# Patient Record
Sex: MALE | Race: WHITE | NOT HISPANIC OR LATINO | Employment: FULL TIME | URBAN - METROPOLITAN AREA
[De-identification: names, ages, dates, MRNs, and addresses within clinical notes are randomized per-mention and may not be internally consistent; named-entity substitution may affect disease eponyms.]

---

## 2017-04-01 ENCOUNTER — ALLSCRIPTS OFFICE VISIT (OUTPATIENT)
Dept: OTHER | Facility: OTHER | Age: 52
End: 2017-04-01

## 2017-07-14 ENCOUNTER — ALLSCRIPTS OFFICE VISIT (OUTPATIENT)
Dept: OTHER | Facility: OTHER | Age: 52
End: 2017-07-14

## 2017-07-14 DIAGNOSIS — I10 ESSENTIAL (PRIMARY) HYPERTENSION: ICD-10-CM

## 2017-07-14 DIAGNOSIS — R97.20 ELEVATED PROSTATE SPECIFIC ANTIGEN (PSA): ICD-10-CM

## 2017-07-14 DIAGNOSIS — N48.22 CELLULITIS OF CORPUS CAVERNOSUM AND PENIS: ICD-10-CM

## 2017-07-14 DIAGNOSIS — R53.83 OTHER FATIGUE: ICD-10-CM

## 2017-07-14 DIAGNOSIS — R76.8 OTHER SPECIFIED ABNORMAL IMMUNOLOGICAL FINDINGS IN SERUM: ICD-10-CM

## 2017-07-18 ENCOUNTER — LAB CONVERSION - ENCOUNTER (OUTPATIENT)
Dept: OTHER | Facility: OTHER | Age: 52
End: 2017-07-18

## 2017-07-18 LAB
A/G RATIO (HISTORICAL): 1.7 (CALC) (ref 1–2.5)
ALBUMIN SERPL BCP-MCNC: 4.4 G/DL (ref 3.6–5.1)
ALP SERPL-CCNC: 44 U/L (ref 40–115)
ALT SERPL W P-5'-P-CCNC: 29 U/L (ref 9–46)
ANTI-NUCLEAR ANTIBODY (ANA) (HISTORICAL): NEGATIVE
AST SERPL W P-5'-P-CCNC: 22 U/L (ref 10–35)
BASOPHILS # BLD AUTO: 0.6 %
BASOPHILS # BLD AUTO: 31 CELLS/UL (ref 0–200)
BILIRUB SERPL-MCNC: 0.6 MG/DL (ref 0.2–1.2)
BUN SERPL-MCNC: 20 MG/DL (ref 7–25)
BUN/CREA RATIO (HISTORICAL): NORMAL (CALC) (ref 6–22)
C-REACTIVE PROTEIN (HISTORICAL): <0.1 MG/DL
CALCIUM SERPL-MCNC: 9.3 MG/DL (ref 8.6–10.3)
CHLORIDE SERPL-SCNC: 104 MMOL/L (ref 98–110)
CHOLEST SERPL-MCNC: 158 MG/DL (ref 125–200)
CHOLEST/HDLC SERPL: 3.2 (CALC)
CO2 SERPL-SCNC: 26 MMOL/L (ref 20–31)
CREAT SERPL-MCNC: 1.3 MG/DL (ref 0.7–1.33)
DEPRECATED RDW RBC AUTO: 12.8 % (ref 11–15)
EGFR AFRICAN AMERICAN (HISTORICAL): 73 ML/MIN/1.73M2
EGFR-AMERICAN CALC (HISTORICAL): 63 ML/MIN/1.73M2
EOSINOPHIL # BLD AUTO: 112 CELLS/UL (ref 15–500)
EOSINOPHIL # BLD AUTO: 2.2 %
GAMMA GLOBULIN (HISTORICAL): 2.6 G/DL (CALC) (ref 1.9–3.7)
GLUCOSE (HISTORICAL): 90 MG/DL (ref 65–99)
HCT VFR BLD AUTO: 44.3 % (ref 38.5–50)
HDLC SERPL-MCNC: 50 MG/DL
HGB BLD-MCNC: 14.8 G/DL (ref 13.2–17.1)
LDL CHOLESTEROL (HISTORICAL): 98 MG/DL (CALC)
LYME 18 KD IGG (HISTORICAL): ABNORMAL
LYME 23 KD IGG (HISTORICAL): ABNORMAL
LYME 23 KD IGM (HISTORICAL): ABNORMAL
LYME 28 KD IGG (HISTORICAL): ABNORMAL
LYME 30 KD IGG (HISTORICAL): ABNORMAL
LYME 39 KD IGG (HISTORICAL): ABNORMAL
LYME 39 KD IGM (HISTORICAL): ABNORMAL
LYME 41 KD IGG (HISTORICAL): REACTIVE
LYME 41 KD IGM (HISTORICAL): ABNORMAL
LYME 45 KD IGG (HISTORICAL): ABNORMAL
LYME 58 KD IGG (HISTORICAL): ABNORMAL
LYME 66 KD IGG (HISTORICAL): REACTIVE
LYME 93 KD IGG (HISTORICAL): ABNORMAL
LYME IGG (HISTORICAL): NEGATIVE
LYME IGG/IGM AB (HISTORICAL): 1.2 INDEX
LYME IGM (HISTORICAL): NEGATIVE
LYMPHOCYTES # BLD AUTO: 1387 CELLS/UL (ref 850–3900)
LYMPHOCYTES # BLD AUTO: 27.2 %
MCH RBC QN AUTO: 28.7 PG (ref 27–33)
MCHC RBC AUTO-ENTMCNC: 33.4 G/DL (ref 32–36)
MCV RBC AUTO: 85.9 FL (ref 80–100)
MONOCYTES # BLD AUTO: 653 CELLS/UL (ref 200–950)
MONOCYTES (HISTORICAL): 12.8 %
NEUTROPHILS # BLD AUTO: 2917 CELLS/UL (ref 1500–7800)
NEUTROPHILS # BLD AUTO: 57.2 %
NON-HDL-CHOL (CHOL-HDL) (HISTORICAL): 108 MG/DL (CALC)
PLATELET # BLD AUTO: NORMAL 10*3/UL
PLATELET # BLD AUTO: NORMAL THOUSAND/UL
POTASSIUM SERPL-SCNC: 4.4 MMOL/L (ref 3.5–5.3)
RBC # BLD AUTO: 5.16 MILLION/UL (ref 4.2–5.8)
RBC MORPHOLOGY (HISTORICAL): NORMAL
RHEUMATOID FACTOR (HISTORICAL): 10 IU/ML
SODIUM SERPL-SCNC: 138 MMOL/L (ref 135–146)
TESTOSTERONE FREE (HISTORICAL): 97.4 PG/ML (ref 35–155)
TESTOSTERONE TOTAL (HISTORICAL): 464 NG/DL (ref 250–1100)
TOTAL PROTEIN (HISTORICAL): 7 G/DL (ref 6.1–8.1)
TRIGL SERPL-MCNC: 49 MG/DL
TSH SERPL DL<=0.05 MIU/L-ACNC: 2 MIU/L (ref 0.4–4.5)
WBC # BLD AUTO: 5.1 THOUSAND/UL (ref 3.8–10.8)

## 2017-07-21 ENCOUNTER — ALLSCRIPTS OFFICE VISIT (OUTPATIENT)
Dept: OTHER | Facility: OTHER | Age: 52
End: 2017-07-21

## 2017-07-21 LAB
BILIRUB UR QL STRIP: NORMAL
CLARITY UR: NORMAL
COLOR UR: YELLOW
GLUCOSE (HISTORICAL): NORMAL
HGB UR QL STRIP.AUTO: NORMAL
KETONES UR STRIP-MCNC: NORMAL MG/DL
LEUKOCYTE ESTERASE UR QL STRIP: NORMAL
NITRITE UR QL STRIP: NORMAL
PH UR STRIP.AUTO: 5.5 [PH]
PROT UR STRIP-MCNC: NORMAL MG/DL
SP GR UR STRIP.AUTO: 1.01
UROBILINOGEN UR QL STRIP.AUTO: 0.2

## 2017-07-28 ENCOUNTER — GENERIC CONVERSION - ENCOUNTER (OUTPATIENT)
Dept: OTHER | Facility: OTHER | Age: 52
End: 2017-07-28

## 2017-07-28 PROCEDURE — G0416 PROSTATE BIOPSY, ANY MTHD: HCPCS | Performed by: UROLOGY

## 2017-07-30 ENCOUNTER — LAB REQUISITION (OUTPATIENT)
Dept: LAB | Facility: HOSPITAL | Age: 52
End: 2017-07-30
Payer: COMMERCIAL

## 2017-07-30 DIAGNOSIS — R97.20 ELEVATED PROSTATE SPECIFIC ANTIGEN (PSA): ICD-10-CM

## 2017-08-02 ENCOUNTER — GENERIC CONVERSION - ENCOUNTER (OUTPATIENT)
Dept: OTHER | Facility: OTHER | Age: 52
End: 2017-08-02

## 2017-09-01 ENCOUNTER — ALLSCRIPTS OFFICE VISIT (OUTPATIENT)
Dept: OTHER | Facility: OTHER | Age: 52
End: 2017-09-01

## 2017-09-15 DIAGNOSIS — M25.529 PAIN IN ELBOW: ICD-10-CM

## 2017-09-15 DIAGNOSIS — I10 ESSENTIAL (PRIMARY) HYPERTENSION: ICD-10-CM

## 2017-09-15 DIAGNOSIS — N40.0 ENLARGED PROSTATE WITHOUT LOWER URINARY TRACT SYMPTOMS (LUTS): ICD-10-CM

## 2017-09-27 ENCOUNTER — GENERIC CONVERSION - ENCOUNTER (OUTPATIENT)
Dept: OTHER | Facility: OTHER | Age: 52
End: 2017-09-27

## 2017-09-27 ENCOUNTER — ALLSCRIPTS OFFICE VISIT (OUTPATIENT)
Dept: OTHER | Facility: OTHER | Age: 52
End: 2017-09-27

## 2017-10-04 ENCOUNTER — ALLSCRIPTS OFFICE VISIT (OUTPATIENT)
Dept: OTHER | Facility: OTHER | Age: 52
End: 2017-10-04

## 2017-10-04 ENCOUNTER — HOSPITAL ENCOUNTER (OUTPATIENT)
Dept: RADIOLOGY | Facility: HOSPITAL | Age: 52
Discharge: HOME | End: 2017-10-04
Attending: ORTHOPAEDIC SURGERY
Payer: COMMERCIAL

## 2017-10-04 DIAGNOSIS — M25.529 PAIN IN ELBOW: ICD-10-CM

## 2017-10-04 PROCEDURE — 73080 X-RAY EXAM OF ELBOW: CPT

## 2017-10-06 NOTE — PROGRESS NOTES
Assessment  1  Tendinitis of right triceps (725 11) (M77 8)    Plan  Pain in elbow joint    · * XR ELBOW 3+ VIEW RIGHT; Status:Active - Retrospective By Protocol Authorization; Requested for:04Oct2017;   Tendinitis of right triceps    · MethylPREDNISolone 4 MG Oral Tablet Therapy Pack (Medrol); take daily and  complete  as per instructions on Pack   · Stretch and warm up your muscles during the first 10 minutes , then cool down your  muscles for the last 10 minutes of exercise ; Status:Complete;   Done: 52BAV6188    Discussion/Summary    Patient seen and examined by Dr Ronald Black, who developed the assessment and plan his care  I am acting as a scribe on his behalf   this time, we will provide patient with a triceps strap  contraindications of corticosteroid injections for this condition  and activities as tolerated, advised caution when doing negative wraps for risk of tendon rupture  referred to PT in office, to review home therapy programinitiate a course of oral steroidif condition worsens  in 2 months for reevaluation  Chief Complaint  1  Elbow Pain  Patient presents for right elbow pain times past 2-3 months  History of Present Illness  HPI: Patient is a 51-year-old, right-hand-dominant male who presents for right elbow pain times past 2-3 months  He notes pain is located at the posterior elbow, is throbbing, nonradiating and is made worse with heavy lifting  And made better with gentle stretching  He further notes associated, occasional right shoulder pain times past 2-3 years  This pain is worse upon waking, especially after sleeping on ipsilateral side  He is currently seeking an appointment with Dr Benito Pickett for evaluation of said shoulder  Patient reports he is an avid weightlifter and attends the gym 3-4 times per week  He denies any associated trauma, numbness, tingling fever or chills    past medical history, surgical history, family history, social history, medications, allergies, and review of systems have been read and reviewed on the chart and have been updated  Review of Systems was recorded in the office today on a separate evaluation sheet and is listed below  Review of Systems    Constitutional: no fever,-not feeling poorly,-no chills-and-not feeling tired  Eyes: eyes not red-and-no eyesight problems  ENT: no hearing loss,-no nosebleeds-and-no sore throat  Cardiovascular: no chest pain-and-no palpitations  Respiratory: no shortness of breath,-no cough-and-no wheezing  Gastrointestinal: no abdominal pain-and-no constipation  Musculoskeletal: as noted in HPI  Integumentary: no rashes  Neurological: no numbness-and-no dizziness  Active Problems  1  Acute tonsillitis (463) (J03 90)   2  Allergic rhinitis (477 9) (J30 9)   3  Angiomyolipoma (223 0) (D17 9)   4  Benign hypertrophy of prostate (600 00) (N40 0)   5  Cellulitis of shaft of penis (607 2) (N48 22)   6  Colorectal polyps (211 3) (K63 5)   7  Creatinine elevation (790 99) (R79 89)   8  Elevated PSA (790 93) (R97 20)   9  Fatigue (780 79) (R53 83)   10  Hand pain, unspecified laterality   11  Hemangioma of skin (228 01) (D18 01)   12  Hypertension (401 9) (I10)   13  Insomnia (780 52) (G47 00)   14  Pain in elbow joint (719 42) (M25 529)   15  Rheumatoid factor positive (795 79) (R76 8)   16  Sinusitis (473 9) (J32 9)   17  Tension type headache (339 10) (G44 209)   18  Thrombocytopenia (287 5) (D69 6)   19   Urinary urgency (788 63) (R39 15)    Past Medical History   · History of herpes simplex infection (V12 09) (Z86 19)   · History of Normal echocardiogram   · History of Normal nuclear stress test    Surgical History   · History of Complete Colonoscopy    Family History   · Family history of CABG   · Family history of Diabetes Mellitus (V18 0)   · Family history of Hypertension (V17 49)   · Family history of Hypertension (V17 49)   · Family history of Kidney Cancer (V16 51)   · Family history of Prostate Cancer (T44 85)    Social History   · Marital History - Currently    · Never A Smoker    Current Meds   1  AmLODIPine Besylate 10 MG Oral Tablet; take one tablet by mouth every day; Therapy: 96XNB2812 to (UJKKHBSX:52JTI3743)  Requested for: 81ZGT7338; Last   Rx:46Kjh6238 Ordered   2  Bystolic 10 MG Oral Tablet; TAKE 1 TABLET DAILY; Therapy: 67Ihj9435 to (Evaluate:55Bso5910)  Requested for: 01Apr2017; Last   Rx:01Apr2017 Ordered   3  Centrum Ultra Mens Oral Tablet; Take 1 tablet daily; Therapy: ([id number]) to Recorded   4  HydroCHLOROthiazide 12 5 MG Oral Tablet; take 1/2 tablet daily; Therapy: 77CTD7290 to (Leonides Irwin)  Requested for: 01Sep2017 Recorded   5  Lisinopril 2 5 MG Oral Tablet; take 1-2 tabs po qd; Therapy: 95Bnk0286 to (Last Rx:58Qpa9911)  Requested for: 01Sep2017 Ordered   6  Turmeric Curcumin Oral Capsule; TAKE 6 CAPSULE DAILY; Therapy: 02Gqa1709 to (Evaluate:12Oct2017) Recorded   7  Vitamin D3 2000 UNIT Oral Tablet; take 3 tablets daily; Therapy: 68JXW0417 to Recorded    Allergies  1  Penicillins   2  Avelox TABS    Vitals  Signs   Heart Rate: 61  Systolic: 788  Diastolic: 79  Height: 5 ft 7 in  Weight: 211 lb   BMI Calculated: 33 05  BSA Calculated: 2 07    Physical Exam      General: No acute distress, age-appropriate  Neck: Supple, trachea midline  HEENT: Normocephalic atraumatic, mucous membranes are moist, sclera are nonicteric  Cardiovascular: No discernable arrhythmia  Respiratory: Breathing is even and unlabored, no stridor or audible wheezing  Psychiatric: Awake alert and oriented x3, normal mood and affect  Abdomen: Without rebound or guarding  Right Basic:   Right upper extremity: There is no gross deformity, edema, erythema or ecchymosis  There is mild to moderate point tenderness to palpation at the triceps insertion site  There is full elbow range of motion  Neurovascularly intact distally     R Elbow: Tenderness (Triceps insertion), but Full Range of Motion, No Valgus instability, No Varus Instability, No Posteromedial instability, No Posterolateral instability, Negative Pivot Shift Test, Negative Milking Test, Negative Valgus Hyperextension Test, Negative Hangar Sign, No Olecranon Bursitis and No Crepitus    Skin: was evaluated and demonstrated no masses, no abrasions, no erythema, no effusion, no edema, no fluctuance, no induration, no laceration, no ulceration, no lymphadenopathy  Right Upper Neurovascular: were evaluated and demonstrated: The patient has normal motor strength to the median, ulnar and radial nerve  Normal sensation to the median, ulnar, and radial nerve  Normal pulses in the radial and ulnar artery  Capillary refill is < 2 seconds  Results/Data  I personally reviewed the films/images/results in the office today  My interpretation follows  X-ray Review X-rays of right elbow reveal no osseous abnormalities  Minimal arthritic change  Attending Note  Collaborating Physician Note: Collaborating Physician: I interviewed and examined the patient,-I supervised the Advanced Practitioner-and-I agree with the Advanced Practitioner note  Attending Note [facility]:  Please see the note documented above with my additions and corrections as dictated in the above-mentioned note  Future Appointments    Date/Time Provider Specialty Site   10/20/2017 07:30 AM VAUGHN Benedict  [facility] [address]   10/17/2017 02:30 PM VAUGHN White  Orthopedic Surgery [facility]   12/15/2017 07:45 AM VAUGHN Garcia   Orthopedic Surgery [facility]   08/01/2018 04:15 PM Radha Garsia MD Urology [address]     Signatures   Electronically signed by : ALLIE Reeves; Oct  4 2017  5:27PM EST                       (Author)    Electronically signed by : VAUGHN Baker ; Oct  5 2017  3:01PM EST                       (Author)

## 2017-10-17 ENCOUNTER — APPOINTMENT (OUTPATIENT)
Dept: RADIOLOGY | Facility: OTHER | Age: 52
End: 2017-10-17
Payer: COMMERCIAL

## 2017-10-17 ENCOUNTER — ALLSCRIPTS OFFICE VISIT (OUTPATIENT)
Dept: OTHER | Facility: OTHER | Age: 52
End: 2017-10-17

## 2017-10-17 DIAGNOSIS — M75.41 IMPINGEMENT SYNDROME OF RIGHT SHOULDER: ICD-10-CM

## 2017-10-17 DIAGNOSIS — M25.511 PAIN IN RIGHT SHOULDER: ICD-10-CM

## 2017-10-17 PROCEDURE — 73030 X-RAY EXAM OF SHOULDER: CPT

## 2017-10-18 NOTE — PROGRESS NOTES
Assessment  1  Shoulder pain, right (719 41) (M25 511)   2  Impingement syndrome of right shoulder (726 2) (K24 04)    Plan  Impingement syndrome of right shoulder    · Follow-up visit in 6 weeks Evaluation and Treatment  Follow-up  Status: Complete  Done:  89ZBT5929   · *1 - [facility]  eval and treat  1 to 3 times a week for 4 weeks  transition to home program when appropriate  ROm, stretching and strengthening  local modalities at therapist discretion  thank you  Status: Active  Requested for: 70ROT0204  Care Summary provided  : Yes  Shoulder pain, right    · * XR SHOULDER 2+ VIEW RIGHT; Status:Active - Retrospective By Protocol  Authorization; Requested for:51Ecc6593;     Discussion/Summary    The patient has an examination and clinical history consistent with subacromial impingement syndrome and some mild acromioclavicular joint symptoms  At this point given his lack of care a referral to physical therapy was provided and subacromial injection was offered and then performed as detailed above  I do feel he should benefit from this treatment and we be happy to reevaluate him in 6-8 weeks, failure of improvement may require further evaluation, possibly MRI scan of the right shoulder  Marcello Coresa MD interviewed and examined the patient, reviewed the diagnostic imaging and developed/ implemented the plan of care as described in my discussion  The visit was performed with the assistance of my physician assistant Ms Roseline Shah who scribed some parts of the note  I personally wrote and developed the discussion  History of Present Illness  Dilan Ortez is a 45 y/o male who presents with 6 month history of right shoulder pain  He denies any known injury or trauma to the right shoulder  Occasional discomfort throughout the day (especially after working out in the gym)  Most pain and discomfort occurs at night when sleeping on the right side   Dr Lucillie Cabot gave him a medrol dose pack for his elbow tendonitis and that helped over 50 % with the right shoulder  Unfortunately he had to stop the prednisone due to his blood pressure  He has not had any formal treatment such as therapy  Review of Systems    Constitutional: No fever or chills, feels well, no tiredness, no recent weight loss or weight gain  Cardiovascular: No complaints of chest pain, no palpitations, no leg claudication or lower extremity edema  Respiratory: No complaints of shortness of breath, no wheezing, no cough  Gastrointestinal: No complaints of abdominal pain, no constipation, no nausea or vomiting, no diarrhea or bloody stools  Musculoskeletal: as noted in HPI  Integumentary: No complaints of skin rash or lesion, no itching or dry skin, no skin wounds  Neurological: No complaints of headache, no confusion, no numbness or tingling, no dizziness  ROS reviewed  Active Problems  1  Acute tonsillitis (463) (J03 90)   2  Allergic rhinitis (477 9) (J30 9)   3  Angiomyolipoma (223 0) (D17 9)   4  Benign hypertrophy of prostate (600 00) (N40 0)   5  Cellulitis of shaft of penis (607 2) (N48 22)   6  Colorectal polyps (211 3) (K63 5)   7  Creatinine elevation (790 99) (R79 89)   8  Elevated PSA (790 93) (R97 20)   9  Fatigue (780 79) (R53 83)   10  Hand pain, unspecified laterality   11  Hemangioma of skin (228 01) (D18 01)   12  Hypertension (401 9) (I10)   13  Insomnia (780 52) (G47 00)   14  Pain in elbow joint (719 42) (M25 529)   15  Rheumatoid factor positive (795 79) (R76 8)   16  Shoulder pain, right (719 41) (M25 511)   17  Sinusitis (473 9) (J32 9)   18  Tendinitis of right triceps (726 39) (M77 8)   19  Tension type headache (339 10) (G44 209)   20  Thrombocytopenia (287 5) (D69 6)   21   Urinary urgency (788 63) (R39 15)    Past Medical History   · History of herpes simplex infection (V12 09) (Z86 19)   · History of Normal echocardiogram   · History of Normal nuclear stress test    The active problems and past medical history were reviewed and updated today  Surgical History   · History of Complete Colonoscopy    The surgical history was reviewed and updated today  Family History  Mother    · Family history of CABG   · Family history of Diabetes Mellitus (V18 0)   · Family history of Hypertension (V17 49)  Father    · Family history of Hypertension (V17 49)  Family History    · Family history of Kidney Cancer (V16 51)   · Family history of Prostate Cancer (V16 42)    The family history was reviewed and updated today  Social History   · Marital History - Currently    · Never A Smoker  The social history was reviewed and updated today  Current Meds   1  AmLODIPine Besylate 10 MG Oral Tablet; take one tablet by mouth every day; Therapy: 19NOG9172 to (GOGCYD:63TFY3108)  Requested for: 39FBN8241; Last   Rx:27Ire3748 Ordered   2  Bystolic 10 MG Oral Tablet; TAKE 1 TABLET DAILY; Therapy: 08Ocl4771 to (Evaluate:66Qlo6482)  Requested for: 81Byt8275; Last   Rx:61Bpg3561 Ordered   3  Centrum Ultra Mens Oral Tablet; Take 1 tablet daily; Therapy: (Gina Claire) to Recorded   4  HydroCHLOROthiazide 12 5 MG Oral Tablet; take 1/2 tablet daily; Therapy: 39QAJ4416 to (Alana Le)  Requested for: 43Hgv0986 Recorded   5  Lisinopril 2 5 MG Oral Tablet; take 1-2 tabs po qd; Therapy: 27Zpn7870 to (Last Rx:68Tel9841)  Requested for: 22Dsn7642 Ordered   6  MethylPREDNISolone 4 MG Oral Tablet Therapy Pack; take daily and complete  as per   instructions on Pack; Therapy: 72YTT0653 to (Last Rx:04Oct2017)  Requested for: 69RBB4509 Ordered   7  Turmeric Curcumin Oral Capsule; TAKE 6 CAPSULE DAILY; Therapy: 69Bqb4072 to (Evaluate:12Oct2017) Recorded   8  Vitamin D3 2000 UNIT Oral Tablet; take 3 tablets daily; Therapy: 36CFH7202 to Recorded    The medication list was reviewed and updated today  Allergies  1  Penicillins   2   Avelox TABS    Vitals   Recorded: 22OUZ5203 02:42PM   Heart Rate 59   Systolic 040, Sitting   Diastolic 78, Sitting   Weight 210 lb 4 oz   BMI Calculated 32 93   BSA Calculated 2 07     Physical Exam    Right Shoulder: Appearance: AC joint hypertrophy, but-- no dislocation,-- no ecchymosis-- and-- no erythema  Tenderness: AC joint, but-- not the deltoid-- and-- not the trapezial  ROM: equivalent both sides  Motor: Normal  Special Tests: positive Painful Arc,-- positive Neer test,-- positive Badillo's test-- and-- positive Speed's test, but-- negative Espinosa test,-- negative Drop Arm test-- and-- negative Empty Can test    Constitutional - General appearance: Normal    Musculoskeletal - Muscle strength/tone: Normal -- Upper extremity compartments: Normal    Neurologic - Sensation: Normal -- Upper extremity peripheral neuro exam: Normal    Psychiatric - Orientation to person, place, and time: Normal -- Mood and affect: Normal       Results/Data  I personally reviewed the films/images/results in the office today  My interpretation follows  X-ray Review 3 views right shoulder: no glenohumeral OA, no calcific tendinitis; mild to moderate AC joint arthritis  Procedure    Procedure: Injection of the left subacromial bursa  Indication:  inflammation  Were discussed with the patient  Verbal consent was obtained prior to the procedure  Alcohol was used to prep the area  ethyl chloride spray was used as a topical anesthetic  Using sterile technique, the aspiration/injection needle was then directed from a lateral aspect  A 22-gauge was used to inject 2 mL 0 25% Bupivacaine-- and-- mL of 6mg/mL betamethasone  A bandage was applied  the patient tolerated the procedure well  Complications: none  Patient instructed to avoid strenuous activity for 1 day(s)  Follow-up in the office in 6 week(s)  Future Appointments    Date/Time Provider Specialty Site   10/20/2017 07:30 AM VAUGHN Veras   [address]   12/21/2017 10:20 AM VAUGHN Medrano  Orthopedic Surgery [facility]   12/15/2017 07:45 AM VAUGHN Lamas   Orthopedic Surgery [facility] RE[facility]ABILIT[facility]   08/01/2018 04:15 PM Anayeli Otto MD Urology [address] [place] [address]     Signatures   Electronically signed by : VAUGHN Gomez ; Oct 17 2017  4:17PM EST                       (Author)

## 2017-10-20 ENCOUNTER — ALLSCRIPTS OFFICE VISIT (OUTPATIENT)
Dept: OTHER | Facility: OTHER | Age: 52
End: 2017-10-20

## 2017-10-23 NOTE — PROGRESS NOTES
Assessment  1  Hypertension (401 9) (I10)   · Stress ECHO was normal 1/2016      Renal artery scan - 12/2015 - NORMAL   2  Elevated PSA (790 93) (R97 20)   · MRI prostate 8/2016 - diffuse abnormality , tumor vs prostatitis      [facility] [place] follows   3  Rheumatoid factor positive (795 79) (R76 8)   · Evaluated by Dr Angela Jurado  Fatigue, Rheumatoid factor positive    · 2 - Catie DOWLING, Mee Barclay  (Rheumatology) Co-Management  *  Status: Hold For -  Scheduling  Requested for: 01ZHC5035  Care Summary provided  : Yes    Discussion/Summary    Patient presents for follow-up  controlled, continue regimen of amlodipine, Bystolic and HCTZ 7 72 mg daily  Patient will proceed with BMP  rheumatoid factor  Patient reports significant improvement of his symptoms after that steroid Dosepak  He also noticed benefits of daily Turmeric supplements  am concerned about possibility of connective tissue disease, undiagnosed  Referral to rheumatology for further evaluation  PSA-urology follows6 weeks  The patient was counseled regarding instructions for management,-- impressions  Possible side effects of new medications were reviewed with the patient/guardian today  The treatment plan was reviewed with the patient/guardian  The patient/guardian understands and agrees with the treatment plan      Chief Complaint  Pt is here for follow up office visit  All meds/allergies reviewed and updated w/pt  History of Present Illness  Follow-up hypertension and elevated PSAprostate bx was negativesees Dr Maradiaga - urology   lisinopril caused  dryness' and cough   back to HCTZ 1/2 tab po qd  Patient remains on Bystolic and Norvascis feeling well overall aside from chronic arthralgias  was evaluated by [facility]'s Orthopedic surgery regarding shoulder and elbow pain  noted drastic improvement of all his symptoms while on steroid Dosepak; patient with history of positive rheumatoid factor, he was evaluated by Rheumatology few years ago, no definitive diagnosis or treatment  suggested at that time  is currently using Turmeric supplements and has noticed that his tonsils are smaller and generalized achiness has improved  Review of Systems    Constitutional: No fever or chills, feels well, no tiredness, no recent weight gain or weight loss  Eyes: No complaints of eye pain, no red eyes, no discharge from eyes, no itchy eyes  ENT: no complaints of earache, no hearing loss, no nosebleeds, no nasal discharge, no sore throat, no hoarseness  Cardiovascular: No complaints of slow heart rate, no fast heart rate, no chest pain, no palpitations, no leg claudication, no lower extremity  Respiratory: No complaints of shortness of breath, no wheezing, no cough, no SOB on exertion, no orthopnea or PND  Gastrointestinal: No complaints of abdominal pain, no constipation, no nausea or vomiting, no diarrhea or bloody stools  Genitourinary: No complaints of dysuria, no incontinence, no hesitancy, no nocturia, no genital lesion, no testicular pain  Musculoskeletal: arthralgias-- and-- myalgias  Integumentary: No complaints of skin rash or skin lesions, no itching, no skin wound, no dry skin  Neurological: No compliants of headache, no confusion, no convulsions, no numbness or tingling, no dizziness or fainting, no limb weakness, no difficulty walking  Psychiatric: Is not suicidal, no sleep disturbances, no anxiety or depression, no change in personality, no emotional problems  Endocrine: No complaints of proptosis, no hot flashes, no muscle weakness, no erectile dysfunction, no deepening of the voice, no feelings of weakness  Hematologic/Lymphatic: No complaints of swollen glands, no swollen glands in the neck, does not bleed easily, no easy bruising  Active Problems  1  Acute tonsillitis (463) (J03 90)   2  Allergic rhinitis (477 9) (J30 9)   3  Angiomyolipoma (223 0) (D17 9)   4  Benign hypertrophy of prostate (600 00) (N40 0)   5  Cellulitis of shaft of penis (607 2) (N48 22)   6  Colorectal polyps (211 3) (K63 5)   7  Creatinine elevation (790 99) (R79 89)   8  Elevated PSA (790 93) (R97 20)   9  Fatigue (780 79) (R53 83)   10  Hand pain, unspecified laterality   11  Hemangioma of skin (228 01) (D18 01)   12  Hypertension (401 9) (I10)   13  Impingement syndrome of right shoulder (726 2) (M75 41)   14  Insomnia (780 52) (G47 00)   15  Pain in elbow joint (719 42) (M25 529)   16  Rheumatoid factor positive (795 79) (R76 8)   17  Shoulder pain, right (719 41) (M25 511)   18  Sinusitis (473 9) (J32 9)   19  Tendinitis of right triceps (726 39) (M77 8)   20  Tension type headache (339 10) (G44 209)   21  Thrombocytopenia (287 5) (D69 6)   22  Urinary urgency (788 63) (R39 15)    Past Medical History  1  History of herpes simplex infection (V12 09) (Z86 19)   2  History of Normal echocardiogram   3  History of Normal nuclear stress test    The active problems and past medical history were reviewed and updated today  Surgical History  1  History of Complete Colonoscopy    The surgical history was reviewed and updated today  Family History  Mother    1  Family history of CABG   2  Family history of Diabetes Mellitus (V18 0)   3  Family history of Hypertension (V17 49)  Father    4  Family history of Hypertension (V17 49)  Family History    5  Family history of Kidney Cancer (V16 51)   6  Family history of Prostate Cancer (V16 42)    The family history was reviewed and updated today  Social History   · Marital History - Currently    · Never A Smoker  The social history was reviewed and updated today  Current Meds   1  AmLODIPine Besylate 10 MG Oral Tablet; take one tablet by mouth every day; Therapy: 66KKK4992 to (WUVEANFX:51TGI2357)  Requested for: 01PBZ2604; Last   Rx:40Cdw3888 Ordered   2  Bystolic 10 MG Oral Tablet; TAKE 1 TABLET DAILY;    Therapy: 77Poi4942 to (Evaluate:49Vvi5265)  Requested for: 01Apr2017; Last Rx:58Aof4597 Ordered   3  Centrum Ultra Mens Oral Tablet; Take 1 tablet daily; Therapy: (Enrique Wayne) to Recorded   4  HydroCHLOROthiazide 12 5 MG Oral Tablet; take 1/2 tablet daily; Therapy: 65XMT0937 to (Areli Winn)  Requested for: 01Sep2017 Recorded   5  Turmeric Curcumin Oral Capsule; TAKE 6 CAPSULE DAILY; Therapy: 70Bdv0318 to (Evaluate:12Oct2017) Recorded   6  Vitamin D3 2000 UNIT Oral Tablet; take 3 tablets daily; Therapy: 18LSX7410 to Recorded    The medication list was reviewed and updated today  Allergies  1  Penicillins   2  Avelox TABS    Vitals  Vital Signs    Recorded: 64PDL0056 07:35AM   Temperature 97 3 F   Heart Rate 60   Respiration 16   Systolic 099   Diastolic 80   Height 5 ft 7 in   Weight 211 lb 6 oz   BMI Calculated 33 11   BSA Calculated 2 07     Physical Exam    Constitutional   General appearance: No acute distress, well appearing and well nourished  Ears, Nose, Mouth, and Throat   Oropharynx: Normal with no erythema, edema, exudate or lesions  The posterior pharynx was not erythematous-- and-- did not have an exudate  There was 1-2+ enlargement, but no erythema, no swelling and no concretions of both tonsils  Pulmonary   Respiratory effort: No increased work of breathing or signs of respiratory distress  Auscultation of lungs: Clear to auscultation, equal breath sounds bilaterally, no wheezes, no rales, no rhonci  Cardiovascular   Auscultation of heart: Normal rate and rhythm, normal S1 and S2, without murmurs  Examination of extremities for edema and/or varicosities: Normal     Carotid pulses: Normal     Musculoskeletal   Gait and station: Normal     Neurologic   Cranial nerves: Cranial nerves 2-12 intact  Psychiatric   Orientation to person, place and time: Normal     Mood and affect: Normal          Future Appointments    Date/Time Provider Specialty Site   12/08/2017 08:00 AM Melony Prader, M D   Family Medicine [address] [place] [address] PRACTICE   12/21/2017 10:20 AM VAUGHN Painting  Orthopedic Surgery [facility] SP[facility]   12/15/2017 07:45 AM VAUGHN Omer  Orthopedic Surgery [facility]   08/01/2018 04:15 PM Sanna Angelucci, MD Urology [address] [place] [address]     Signatures   Electronically signed by :  VAUGHN Figueredo ; Oct 22 2017  8:08PM EST                       (Author)

## 2017-10-29 ENCOUNTER — GENERIC CONVERSION - ENCOUNTER (OUTPATIENT)
Dept: OTHER | Facility: OTHER | Age: 52
End: 2017-10-29

## 2017-10-29 LAB
BUN SERPL-MCNC: 24 MG/DL (ref 7–25)
BUN/CREA RATIO (HISTORICAL): NORMAL (CALC) (ref 6–22)
CALCIUM SERPL-MCNC: 9.6 MG/DL (ref 8.6–10.3)
CHLORIDE SERPL-SCNC: 107 MMOL/L (ref 98–110)
CO2 SERPL-SCNC: 28 MMOL/L (ref 20–31)
CREAT SERPL-MCNC: 1.31 MG/DL (ref 0.7–1.33)
EGFR AFRICAN AMERICAN (HISTORICAL): 73 ML/MIN/1.73M2
EGFR-AMERICAN CALC (HISTORICAL): 63 ML/MIN/1.73M2
GLUCOSE (HISTORICAL): 97 MG/DL (ref 65–99)
POTASSIUM SERPL-SCNC: 4.1 MMOL/L (ref 3.5–5.3)
SODIUM SERPL-SCNC: 140 MMOL/L (ref 135–146)

## 2017-11-09 ENCOUNTER — APPOINTMENT (OUTPATIENT)
Dept: PHYSICAL THERAPY | Facility: REHABILITATION | Age: 52
End: 2017-11-09
Payer: COMMERCIAL

## 2017-11-09 ENCOUNTER — GENERIC CONVERSION - ENCOUNTER (OUTPATIENT)
Dept: OTHER | Facility: OTHER | Age: 52
End: 2017-11-09

## 2017-11-09 PROCEDURE — G8991 OTHER PT/OT GOAL STATUS: HCPCS

## 2017-11-09 PROCEDURE — G8990 OTHER PT/OT CURRENT STATUS: HCPCS

## 2017-11-09 PROCEDURE — 97161 PT EVAL LOW COMPLEX 20 MIN: CPT

## 2017-11-09 PROCEDURE — 97110 THERAPEUTIC EXERCISES: CPT

## 2017-11-15 ENCOUNTER — APPOINTMENT (OUTPATIENT)
Dept: PHYSICAL THERAPY | Facility: REHABILITATION | Age: 52
End: 2017-11-15
Payer: COMMERCIAL

## 2017-11-15 PROCEDURE — 97110 THERAPEUTIC EXERCISES: CPT

## 2017-11-15 PROCEDURE — 97112 NEUROMUSCULAR REEDUCATION: CPT

## 2017-11-15 PROCEDURE — 97140 MANUAL THERAPY 1/> REGIONS: CPT

## 2017-11-20 ENCOUNTER — APPOINTMENT (OUTPATIENT)
Dept: PHYSICAL THERAPY | Facility: REHABILITATION | Age: 52
End: 2017-11-20
Payer: COMMERCIAL

## 2017-11-20 PROCEDURE — 97112 NEUROMUSCULAR REEDUCATION: CPT

## 2017-11-20 PROCEDURE — 97110 THERAPEUTIC EXERCISES: CPT

## 2017-11-20 PROCEDURE — 97140 MANUAL THERAPY 1/> REGIONS: CPT

## 2017-11-22 ENCOUNTER — APPOINTMENT (OUTPATIENT)
Dept: PHYSICAL THERAPY | Facility: REHABILITATION | Age: 52
End: 2017-11-22
Payer: COMMERCIAL

## 2017-11-22 PROCEDURE — 97140 MANUAL THERAPY 1/> REGIONS: CPT

## 2017-11-22 PROCEDURE — 97110 THERAPEUTIC EXERCISES: CPT

## 2017-11-22 PROCEDURE — 97112 NEUROMUSCULAR REEDUCATION: CPT

## 2017-12-08 ENCOUNTER — ALLSCRIPTS OFFICE VISIT (OUTPATIENT)
Dept: OTHER | Facility: OTHER | Age: 52
End: 2017-12-08

## 2017-12-08 DIAGNOSIS — R76.8 OTHER SPECIFIED ABNORMAL IMMUNOLOGICAL FINDINGS IN SERUM: ICD-10-CM

## 2017-12-08 DIAGNOSIS — I10 ESSENTIAL (PRIMARY) HYPERTENSION: ICD-10-CM

## 2017-12-08 DIAGNOSIS — D69.6 THROMBOCYTOPENIA (HCC): ICD-10-CM

## 2017-12-08 DIAGNOSIS — Z00.00 ENCOUNTER FOR GENERAL ADULT MEDICAL EXAMINATION WITHOUT ABNORMAL FINDINGS: ICD-10-CM

## 2017-12-11 NOTE — PROGRESS NOTES
Assessment    1  Hypertension (401 9) (I10)   · Stress ECHO was normal 1/2016    Renal artery scan - 12/2015 - NORMAL   2  Rheumatoid factor positive (795 79) (R76 8)   · Evaluated by Dr Suni Coello Maintenance, Hypertension, Rheumatoid factor positive, Thrombocytopenia    · (1) ELHAM SCREEN W/REFLEX TO TITER/PATTERN; Status:Active; Requestedfor:92Qpv7173;    · (1) CBC/PLT/DIFF; Status:Active; Requested for:04Nih6772;    · (1) COMPREHENSIVE METABOLIC PANEL; Status:Active; Requested for:22Ytx0796;    · (1) C-REACTIVE PROTEIN; Status:Active; Requested for:05Wwc1618;    · (1) RHEUMATOID FACTOR SCREEN; Status:Active; Requested for:92Wfr3572;    · (1) SED RATE; Status:Active; Requested for:92Uvw6892;    · (1) SJOGRENS ANTIBODIES; Status:Active; Requested for:01Gsm5341;    · (1) TSH; Status:Active; Requested for:89Qoh9354;    · (Q) LYME DISEASE ANTIBODIES (IGG, IGM) WESTERN BLOT; Status:Active; Requestedfor:15Xuy2080;     Discussion/Summary    Patient presents for follow-up of chronic medical conditions  He has been feeling stably  Blood pressure is well controlled, he remains on regimen of Norvasc, Bystolic and low dose of HCTZ of 6 25 mg daily  Chronic myalgias, arthralgias, fatigue  Positive rheumatoid factor in the past, patient did notice dramatic improvement in his myalgias and arthralgias after completing prednisone taper  He is awaiting re-evaluation by Rheumatology  Will proceed with blood work as outlined above  Continue same medication regimen  He is under care of [facility] for treatment of shoulder and elbow pain  Rheumatology evaluation is unremarkable, diagnosis of fibromyalgia could be considered, we might try patient on Cymbalta   Follow-up 6 weeks  The patient was counseled regarding instructions for management,-- impressions  total time of encounter was 15 minutes-- and-- 15 minutes was spent counseling  Chief Complaint  pt is here for 6 week follow up  meds and allergies reviewed      History of Present Illness  pending rheumatology evaluationLudovicoamlodipine and Bystolicuses HCTZ 93 7 half a tablet daily  He has been feeling well overall  Blood pressures are well controlled  He added extra few half dose of Bystolic at night on a few occasions throughout the month, he did notice elevated blood pressures while he is stressed out  overall is coping well  He has been feeling stably, exercises routinely, compliant with medications  He is following up with Orthopedic surgery for treatment of elbow and shoulder pain  Review of Systems   Constitutional: No fever or chills, feels well, no tiredness, no recent weight gain or weight loss  Cardiovascular: No complaints of slow heart rate, no fast heart rate, no chest pain, no palpitations, no leg claudication, no lower extremity  Respiratory: No complaints of shortness of breath, no wheezing, no cough, no SOB on exertion, no orthopnea or PND  Musculoskeletal: arthralgias-- and-- myalgias, but-- as noted in HPI  Endocrine: No complaints of proptosis, no hot flashes, no muscle weakness, no erectile dysfunction, no deepening of the voice, no feelings of weakness  Hematologic/Lymphatic: No complaints of swollen glands, no swollen glands in the neck, does not bleed easily, no easy bruising  Active Problems  1  Acute tonsillitis (463) (J03 90)   2  Allergic rhinitis (477 9) (J30 9)   3  Angiomyolipoma (223 0) (D17 9)   4  Benign hypertrophy of prostate (600 00) (N40 0)   5  Cellulitis of shaft of penis (607 2) (N48 22)   6  Colorectal polyps (211 3) (K63 5)   7  Creatinine elevation (790 99) (R79 89)   8  Elevated PSA (790 93) (R97 20)   9  Fatigue (780 79) (R53 83)   10  Hand pain, unspecified laterality   11  Hemangioma of skin (228 01) (D18 01)   12  Hypertension (401 9) (I10)   13  Impingement syndrome of right shoulder (726 2) (M75 41)   14  Insomnia (780 52) (G47 00)   15   Pain in elbow joint (719 42) (M25 529)   16  Rheumatoid factor positive (795 79) (R76 8)   17  Shoulder pain, right (719 41) (M25 511)   18  Sinusitis (473 9) (J32 9)   19  Tendinitis of right triceps (726 39) (M77 8)   20  Tension type headache (339 10) (G44 209)   21  Thrombocytopenia (287 5) (D69 6)   22  Urinary urgency (788 63) (R39 15)    Past Medical History  1  History of herpes simplex infection (V12 09) (Z86 19)   2  History of Normal echocardiogram   3  History of Normal nuclear stress test    The active problems and past medical history were reviewed and updated today  Surgical History  1  History of Complete Colonoscopy    The surgical history was reviewed and updated today  Family History  Mother    1  Family history of CABG   2  Family history of Diabetes Mellitus (V18 0)   3  Family history of Hypertension (V17 49)  Father    4  Family history of Hypertension (V17 49)  Family History    5  Family history of Kidney Cancer (V16 51)   6  Family history of Prostate Cancer (V16 42)    The family history was reviewed and updated today  Social History     · Marital History - Currently    · Never A Smoker  The social history was reviewed and updated today  Current Meds   1  AmLODIPine Besylate 10 MG Oral Tablet; take one tablet by mouth every day; Therapy: 72PWD1959 to (CXSBFNYS:25ZAB8156)  Requested for: 07GWZ6050; Last Rx:30Cve1295 Ordered   2  Bystolic 10 MG Oral Tablet; TAKE 1 TABLET DAILY; Therapy: 12Kfm9689 to (Evaluate:37Xxe2093)  Requested for: 40Jfh5023; Last Rx:01Apr2017 Ordered   3  Centrum Ultra Mens Oral Tablet; Take 1 tablet daily; Therapy: (608 676 542) to Recorded   4  HydroCHLOROthiazide 12 5 MG Oral Tablet; take one tablet by mouth every day; Therapy: 15HBL6156 to (Evaluate:51Yad5933)  Requested for: 18MNO4573; Last Rx:12Nov2017 Ordered   5  Turmeric Curcumin Oral Capsule; TAKE 6 CAPSULE DAILY; Therapy: 07Kin8736 to (Evaluate:68Mpg0226) Recorded   6   Vitamin D3 2000 UNIT Oral Tablet; take 3 tablets daily; Therapy: 09XMN2697 to Recorded    The medication list was reviewed and updated today  Allergies  1  Penicillins   2  Avelox TABS    Vitals  Vital Signs    Recorded: 81QJT0867 08:04AM   Temperature 96 7 F   Heart Rate 62   Respiration 16   Systolic 114   Diastolic 82   Height 5 ft 7 in   Weight 213 lb 8 oz   BMI Calculated 33 44   BSA Calculated 2 08       Physical Exam   Constitutional  General appearance: No acute distress, well appearing and well nourished  Musculoskeletal  Gait and station: Normal    Neurologic  Cranial nerves: Cranial nerves 2-12 intact  Psychiatric  Orientation to person, place and time: Normal    Mood and affect: Normal          Future Appointments    Date/Time Provider Specialty Site   01/19/2018 08:00 AM VAUGHN Estrada  [place] [address]   12/21/2017 10:20 AM VAUGHN Painting  Orthopedic Surgery [facility] SPECIAL[facility] SP[facility]TS   12/15/2017 07:45 AM VAUGHN Omer  Orthopedic Surgery [facility]   08/01/2018 04:15 PM David Alan MD Urology [address] [place] [address]       Signatures   Electronically signed by :  VAUGHN Figueredo ; Dec 10 2017  1:20PM EST                       (Author)

## 2017-12-15 ENCOUNTER — ALLSCRIPTS OFFICE VISIT (OUTPATIENT)
Dept: OTHER | Facility: OTHER | Age: 52
End: 2017-12-15

## 2017-12-17 NOTE — PROGRESS NOTES
Assessment  1  Tendinitis of right triceps (726 39) (M77 8)    Plan  Tendinitis of right triceps    · Follow-up PRN Evaluation and Treatment  Follow-up  Status: Complete - RetrospectiveAuthorization  Done: 49UGL9446   · Continue with our present treatment plan ; Status:Complete - RetrospectiveAuthorization;   Done: 24FUU3719   · 3 - Field DOWLING, Eleanor Bence  (Rheumatology) Co-Management  *  Status: Active - RetrospectiveAuthorization  Requested for: 69GWH2707  are Referring to a non- Preferred Provider : 2nd/3rd Opinion  Care Summary provided  : Yes    Discussion/Summary    Assessment: R tricep tendinitis with generalized inflammationPlan:referral to dr Shahrzad hansen PT and activates to your abilities  f/u after prnThis note was dictated with dictation software  As such, and may contain typographical errors, improperly dictated words, background noise, or other errors  HPI:The patient is a 46year old male who presents today as a follow up to his R tricep tendonitis  Pt states that his pain has inproved by 30% at this point in time  Pt states that the medrol dose pack gave him significant results but the RX started to rise his BP so he discontinued use of it  No numbness or tingling noted  Pt states that his pain is a 5/10 at this point in time  Pt is seeing a rheumatologist  Pt still goes to the gym however he states that he works through the pain  The past medical history, medications, allergies, and review of systems have been read and reviewed on the chart and have been updated  Review of Systems:Constitutional: NegativeSkin: Negative except as aboveMusculoskeletal: As aboveNeurologic: Negative except as abovePsychiatric: NegativeExamination:General / Psych: Awake and Oriented, No acute distress, age appropriateHEENT: Normocephalic, atraumatic, mucous membranes moist, neck supple, trachea midline  Abdomen: No rebound or signs of guardingCardio: No discernible arrhythmia, 2+ pulses with good capillary refillPulmonary: No audible wheezing or audible stridorNeurologic: [The patient is neurovascularly intact in the median, ulnar, and radial nerve distribution  There is normal sensation and good capillary refill within the digits  2+ pulses  ]Skin: [No lesions, rashes, streaking, purulence, abscesses, lymphangitis]Musculoskeletal: full motion at elbow, no tenderness to medial and lateral side of elbow  no step off or deformity noted on triceps  no instability  negative valgus hyperextension test  full strength  Diagnostic Studies: [none]Scribe attestation:INicki, LAT, ATC, am acting as a scribe while in the presence of the attending physician  Physician Attestation:I, Dinora Pandya MD, personally performed the services described in this documentation as scribed in my presence  Active Problems  1  Acute tonsillitis (463) (J03 90)   2  Allergic rhinitis (477 9) (J30 9)   3  Angiomyolipoma (223 0) (D17 9)   4  Benign hypertrophy of prostate (600 00) (N40 0)   5  Cellulitis of shaft of penis (607 2) (N48 22)   6  Colorectal polyps (211 3) (K63 5)   7  Creatinine elevation (790 99) (R79 89)   8  Elevated PSA (790 93) (R97 20)   9  Fatigue (780 79) (R53 83)   10  Hand pain, unspecified laterality   11  Hemangioma of skin (228 01) (D18 01)   12  Hypertension (401 9) (I10)   13  Impingement syndrome of right shoulder (726 2) (M75 41)   14  Insomnia (780 52) (G47 00)   15  Pain in elbow joint (719 42) (M25 529)   16  Rheumatoid factor positive (795 79) (R76 8)   17  Shoulder pain, right (719 41) (M25 511)   18  Sinusitis (473 9) (J32 9)   19  Tendinitis of right triceps (726 39) (M77 8)   20  Tension type headache (339 10) (G44 209)   21  Thrombocytopenia (287 5) (D69 6)   22  Urinary urgency (788 63) (R39 15)    Current Meds   1  AmLODIPine Besylate 10 MG Oral Tablet; take one tablet by mouth every day; Therapy: 18ZRL1938 to (ARRTGPHL:72LEG4407)  Requested for: 15XOH0745; Last Rx:15Dhx9986 Ordered   2   Bystolic 10 MG Oral Tablet; TAKE 1 TABLET DAILY; Therapy: 81Kes8710 to (Evaluate:32Jdk5795)  Requested for: 01Apr2017; Last Rx:01Apr2017 Ordered   3  Centrum Ultra Mens Oral Tablet; Take 1 tablet daily; Therapy: (079 1271 8406) to Recorded   4  HydroCHLOROthiazide 12 5 MG Oral Tablet; take one tablet by mouth every day; Therapy: 46RDM2730 to (Evaluate:77Vna9971)  Requested for: 97DWM8522; Last Rx:12Nov2017 Ordered   5  Turmeric Curcumin Oral Capsule; TAKE 6 CAPSULE DAILY; Therapy: 93Emk9252 to (Evaluate:12Oct2017) Recorded   6  Vitamin D3 2000 UNIT Oral Tablet; take 3 tablets daily; Therapy: 03RIO3773 to Recorded    Allergies  1  Penicillins   2  Avelox TABS    Vitals  Signs   Heart Rate: 60  Systolic: 047  Diastolic: 84  Height: 5 ft 7 in  Weight: 214 lb   BMI Calculated: 33 52  BSA Calculated: 2 08    Future Appointments    Date/Time Provider Specialty Site   01/19/2018 08:00 AM VAUGHN Farmer  [place] [address]   12/21/2017 10:20 AM VAUGHN Ayala   Orthopedic Surgery [facility]   08/01/2018 04:15 PM Kenia Castro MD Urology [address] [place] [address]     Signatures   Electronically signed by : VAUGHN Zamudio ; Dec 16 2017  3:32PM EST                       (Author)

## 2017-12-21 ENCOUNTER — ALLSCRIPTS OFFICE VISIT (OUTPATIENT)
Dept: OTHER | Facility: OTHER | Age: 52
End: 2017-12-21

## 2017-12-21 ENCOUNTER — TRANSCRIBE ORDERS (OUTPATIENT)
Dept: ADMINISTRATIVE | Facility: HOSPITAL | Age: 52
End: 2017-12-21

## 2017-12-21 DIAGNOSIS — M75.41 IMPINGEMENT SYNDROME OF RIGHT SHOULDER: Primary | ICD-10-CM

## 2017-12-22 NOTE — PROGRESS NOTES
Assessment   1  Impingement syndrome of right shoulder (726 2) (M75 41)    Plan   Impingement syndrome of right shoulder    · * MRI SHOULDER RIGHT WO CONTRAST; Status:Need Information - Financial    Authorization; Requested for:[id number];    · Follow-up After MRI Evaluation and Treatment  Follow-up  Status: Hold For - Scheduling     Requested for: 81Ayg5580    Chief Complaint   1  Shoulder Pain    Discussion/Summary      The patient continues to be symptomatic with his right shoulder and at this point MRI scan is indicated to evaluate for structural pathology  His symptoms may indeed be from a rheumatologic condition so he wants to wait until he sees a rheumatologist before he obtains scanning of his shoulder and wants to review the results with me after he sees a rheumatologist as he may benefit from systematic rheumatologic treatment rather than evaluate for surgical pathology of the shoulder  We will see him in the future to review the results of the MRI scan when he has seen the rheumatologist as well  History of Present Illness   Patient returns for follow-up of his right shoulder impingement syndrome  He has made some minimal improvement but still continues to be symptomatic  He is scheduled to see a rheumatologist in the beginning in January for his multiple rheumatologic complaints  Active Problems   1  Acute tonsillitis (463) (J03 90)   2  Allergic rhinitis (477 9) (J30 9)   3  Angiomyolipoma (223 0) (D17 9)   4  Benign hypertrophy of prostate (600 00) (N40 0)   5  Cellulitis of shaft of penis (607 2) (N48 22)   6  Colorectal polyps (211 3) (K63 5)   7  Creatinine elevation (790 99) (R79 89)   8  Elevated PSA (790 93) (R97 20)   9  Fatigue (780 79) (R53 83)   10  Hand pain, unspecified laterality   11  Hemangioma of skin (228 01) (D18 01)   12  Hypertension (401 9) (I10)   13  Impingement syndrome of right shoulder (726 2) (M75 41)   14  Insomnia (780 52) (G47 00)   15   Pain in elbow joint (719 42) (M25 529)   16  Rheumatoid factor positive (795 79) (R76 8)   17  Shoulder pain, right (719 41) (M25 511)   18  Sinusitis (473 9) (J32 9)   19  Tendinitis of right triceps (726 39) (M77 8)   20  Tension type headache (339 10) (G44 209)   21  Thrombocytopenia (287 5) (D69 6)   22  Urinary urgency (788 63) (R39 15)    Past Medical History    · History of herpes simplex infection (V12 09) (Z86 19)   · History of Normal echocardiogram   · History of Normal nuclear stress test    Surgical History    · History of Complete Colonoscopy    Family History   Mother    · Family history of CABG   · Family history of Diabetes Mellitus (V18 0)   · Family history of Hypertension (V17 49)  Father    · Family history of Hypertension (V17 49)  Family History    · Family history of Kidney Cancer (V16 51)   · Family history of Prostate Cancer (V16 42)    Social History    · Marital History - Currently    · Never A Smoker    Current Meds    1  AmLODIPine Besylate 10 MG Oral Tablet; take one tablet by mouth every day; Therapy: 97VFU4925 to (ZGKNIXJT:70JXO9508)  Requested for: 91BLK1292; Last     Rx:30Afg7927 Ordered   2  Bystolic 10 MG Oral Tablet; TAKE 1 TABLET DAILY; Therapy: 58Nvo6909 to (Evaluate:22Pqh4773)  Requested for: 01Apr2017; Last     Rx:01Apr2017 Ordered   3  Centrum Ultra Mens Oral Tablet; Take 1 tablet daily; Therapy: (Torey Valadez) to Recorded   4  HydroCHLOROthiazide 12 5 MG Oral Tablet; take one tablet by mouth every day; Therapy: 52ZCL6950 to (Evaluate:99Bvq3231)  Requested for: 52PTR9505; Last     Rx:12Nov2017 Ordered   5  Turmeric Curcumin Oral Capsule; TAKE 6 CAPSULE DAILY; Therapy: 99Ivu7946 to (Evaluate:12Oct2017) Recorded   6  Vitamin D3 2000 UNIT Oral Tablet; take 3 tablets daily; Therapy: 96QZH2322 to Recorded    Allergies   1  Penicillins   2   Avelox TABS    Vitals    Recorded: 21Dec2017 10:25AM   Heart Rate 60   Systolic 439   Diastolic 83   Height 5 ft 7 in Weight 210 lb    BMI Calculated 32 89   BSA Calculated 2 06     Physical Exam      Right Shoulder: Appearance: no AC joint hypertrophy,-- no AC joint step-off,-- no deltoid atrophy,-- no trapezius atrophy-- and-- no belly of the biceps stephie deformity  Tenderness: not the AC joint,-- not the axillary-- and-- not the bicipital groove  ROM: equivalent both sides  Motor: Normal  Special Tests: positive Painful Arc,-- positive Espinosa test-- and-- positive Neer test       Future Appointments      Date/Time Provider Specialty Site   01/19/2018 08:00 AM VAUGHN Dorsey   [address]   08/01/2018 04:15 PM Fran Gamez MD Urology [address] [place] [address]     Signatures    Electronically signed by : VAUGHN Ramos ; Dec 21 2017 10:38AM EST                       (Author)

## 2017-12-26 ENCOUNTER — LAB CONVERSION - ENCOUNTER (OUTPATIENT)
Dept: OTHER | Facility: OTHER | Age: 52
End: 2017-12-26

## 2017-12-26 ENCOUNTER — GENERIC CONVERSION - ENCOUNTER (OUTPATIENT)
Dept: OTHER | Facility: OTHER | Age: 52
End: 2017-12-26

## 2017-12-26 LAB
A/G RATIO (HISTORICAL): 1.4 (CALC) (ref 1–2.5)
ALBUMIN SERPL BCP-MCNC: 4.4 G/DL (ref 3.6–5.1)
ALP SERPL-CCNC: 41 U/L (ref 40–115)
ALT SERPL W P-5'-P-CCNC: 31 U/L (ref 9–46)
ANTI-NUCLEAR ANTIBODY (ANA) (HISTORICAL): NEGATIVE
AST SERPL W P-5'-P-CCNC: 24 U/L (ref 10–35)
BASOPHILS # BLD AUTO: 0.9 %
BASOPHILS # BLD AUTO: 49 CELLS/UL (ref 0–200)
BILIRUB SERPL-MCNC: 0.8 MG/DL (ref 0.2–1.2)
BUN SERPL-MCNC: 22 MG/DL (ref 7–25)
BUN/CREA RATIO (HISTORICAL): 16 (CALC) (ref 6–22)
C-REACTIVE PROTEIN (HISTORICAL): 0.5 MG/L
CALCIUM SERPL-MCNC: 9.6 MG/DL (ref 8.6–10.3)
CHLORIDE SERPL-SCNC: 104 MMOL/L (ref 98–110)
CO2 SERPL-SCNC: 30 MMOL/L (ref 20–31)
CREAT SERPL-MCNC: 1.39 MG/DL (ref 0.7–1.33)
DEPRECATED RDW RBC AUTO: 12.5 % (ref 11–15)
EGFR AFRICAN AMERICAN (HISTORICAL): 67 ML/MIN/1.73M2
EGFR-AMERICAN CALC (HISTORICAL): 58 ML/MIN/1.73M2
EOSINOPHIL # BLD AUTO: 130 CELLS/UL (ref 15–500)
EOSINOPHIL # BLD AUTO: 2.4 %
ERYTHROCYTE SEDIMENTATION RATE (HISTORICAL): 2 MM/H
GAMMA GLOBULIN (HISTORICAL): 3.1 G/DL (CALC) (ref 1.9–3.7)
GLUCOSE (HISTORICAL): 99 MG/DL (ref 65–99)
HCT VFR BLD AUTO: 44.9 % (ref 38.5–50)
HGB BLD-MCNC: 15.4 G/DL (ref 13.2–17.1)
LYME 18 KD IGG (HISTORICAL): ABNORMAL
LYME 23 KD IGG (HISTORICAL): ABNORMAL
LYME 23 KD IGM (HISTORICAL): ABNORMAL
LYME 28 KD IGG (HISTORICAL): ABNORMAL
LYME 30 KD IGG (HISTORICAL): ABNORMAL
LYME 39 KD IGG (HISTORICAL): ABNORMAL
LYME 39 KD IGM (HISTORICAL): ABNORMAL
LYME 41 KD IGG (HISTORICAL): REACTIVE
LYME 41 KD IGM (HISTORICAL): ABNORMAL
LYME 45 KD IGG (HISTORICAL): ABNORMAL
LYME 58 KD IGG (HISTORICAL): ABNORMAL
LYME 66 KD IGG (HISTORICAL): REACTIVE
LYME 93 KD IGG (HISTORICAL): ABNORMAL
LYME IGG (HISTORICAL): NEGATIVE
LYME IGM (HISTORICAL): NEGATIVE
LYMPHOCYTES # BLD AUTO: 1571 CELLS/UL (ref 850–3900)
LYMPHOCYTES # BLD AUTO: 29.1 %
MCH RBC QN AUTO: 29.6 PG (ref 27–33)
MCHC RBC AUTO-ENTMCNC: 34.3 G/DL (ref 32–36)
MCV RBC AUTO: 86.3 FL (ref 80–100)
MONOCYTES # BLD AUTO: 697 CELLS/UL (ref 200–950)
MONOCYTES (HISTORICAL): 12.9 %
NEUTROPHILS # BLD AUTO: 2954 CELLS/UL (ref 1500–7800)
NEUTROPHILS # BLD AUTO: 54.7 %
PLATELET # BLD AUTO: 128 THOUSAND/UL (ref 140–400)
PMV BLD AUTO: 12 FL (ref 7.5–12.5)
POTASSIUM SERPL-SCNC: 4.1 MMOL/L (ref 3.5–5.3)
RBC # BLD AUTO: 5.2 MILLION/UL (ref 4.2–5.8)
RHEUMATOID FACTOR (HISTORICAL): <14 IU/ML
SODIUM SERPL-SCNC: 139 MMOL/L (ref 135–146)
SSA (RO) ANTIBODY (HISTORICAL): NORMAL AI
SSB (LA) ANTIBODY (HISTORICAL): NORMAL AI
TOTAL PROTEIN (HISTORICAL): 7.5 G/DL (ref 6.1–8.1)
TSH SERPL DL<=0.05 MIU/L-ACNC: 2.49 MIU/L (ref 0.4–4.5)
WBC # BLD AUTO: 5.4 THOUSAND/UL (ref 3.8–10.8)

## 2017-12-28 ENCOUNTER — GENERIC CONVERSION - ENCOUNTER (OUTPATIENT)
Dept: OBGYN CLINIC | Facility: OTHER | Age: 52
End: 2017-12-28

## 2018-01-10 NOTE — RESULT NOTES
Verified Results  (1) TISSUE EXAM 80Glk7885 12:58PM David Alan     Test Name Result Flag Reference   LAB AP CASE REPORT (Report)     Surgical Pathology Report             Case: [id number]                   Authorizing Provider: Royal Araujo MD      Collected:      07/28/2017           Pathologist:      Leah Gillis MD   Received:      07/31/2017 0146        Specimens:  A) - Prostate, Right lateral base                                   B) - Prostate, Right lateral mid                                    C) - Prostate, Right lateral apex                                   D) - Prostate, Left lateral base                                    E) - Prostate, Left lateral mid                                    F) - Prostate, Left lateral apex                                    G) - Prostate, Left base                                        H) - Prostate, Left mid                                        I) - Prostate, Left apex                                        J) - Prostate, Right base                                       K) - Prostate, Right mid                                        L) - Prostate, Right apex   LAB AP FINAL DIAGNOSIS (Report)     A  Prostate, right lateral base, needle biopsy:  - Benign prostate tissue, negative for malignancy  B  Prostate, right lateral mid, needle biopsy:  - Benign prostate tissue, negative for malignancy  C  Prostate, right lateral apex, needle biopsy:  - Benign prostate tissue, negative for malignancy  D  Prostate, left lateral base, needle biopsy:  - Benign prostate tissue, negative for malignancy  E  Prostate, left lateral mid, needle biopsy:  - Benign prostate tissue, negative for malignancy  F  Prostate, left lateral apex, needle biopsy:  - Benign prostate tissue, negative for malignancy  G  Prostate, left base, needle biopsy:  - Benign prostate tissue, negative for malignancy      H  Prostate, left mid, needle biopsy:  - Benign prostate tissue, negative for malignancy  I  Prostate, left apex, needle biopsy:  - Benign prostate tissue, negative for malignancy  J  Prostate, right base, needle biopsy:  - Benign prostate tissue, negative for malignancy  K  Prostate, right mid, needle biopsy:  - Benign prostate tissue, negative for malignancy  L  Prostate, right apex, needle biopsy:  - Benign prostate tissue, negative for malignancy  Electronically signed by Simone Kaufman MD on 8/1/2017 at 12:58 PM   LAB AP SURGICAL ADDITIONAL INFORMATION (Report)     All controls performed with the immunohistochemical stains reported above   reacted appropriately  These tests were developed and their performance   characteristics determined by [facility] or   [facility]  They may not be cleared or approved by the [place]  Food and Drug Administration  The FDA has determined that such clearance   or approval is not necessary  These tests are used for clinical purposes  They should not be regarded as investigational or for research  This   laboratory has been approved by [facility], designated as a high-complexity   laboratory and is qualified to perform these tests  Interpretation performed at [facility]   LAB AP GROSS DESCRIPTION (Report)     A  The specimen is received in formalin, labeled with the patient's name   and hospital number, and is designated Prostate needle biopsy right   lateral base  The specimen consists of 2 cores of white to tan soft   tissue each with a diameter of less than 0 1 centimeters and measuring 0 9   and 1 0 centimeters in length  Entirely submitted  One cassette  B  The specimen is received in formalin, labeled with the patient's name   and hospital number, and is designated Prostate needle biopsy right   lateral mid   The specimen consists of one core of white to tan soft   tissue with a diameter of less than 0 1 centimeters and measuring 1 2 centimeters in length  Entirely submitted  One cassette  C  The specimen is received in formalin, labeled with the patient's name   and hospital number, and is designated Prostate needle biopsy right   lateral apex  The specimen consists of one core of white to tan soft   tissue with a diameter of less than 0 1 centimeters and measuring 0 6   centimeters in length, and 3 additional white to tan soft tissue fragments   each with a diameter of less than 0 1 centimeters and ranging in length   from 0 2 to 0 3 centimeters  Due to the size of the smaller fragments, it   is questionable whether tissue will survive histologic processing  Entirely submitted  2 cassettes  D  The specimen is received in formalin, labeled with the patient's name   and hospital number, and is designated Prostate needle biopsy left   lateral base  The specimen consists of 2 cores of white to tan soft   tissue each with a diameter of less than 0 1 centimeters and measuring 0 6   and 1 7 centimeters in length  Entirely submitted  One cassette  E  The specimen is received in formalin, labeled with the patient's name   and hospital number, and is designated Prostate needle biopsy left   lateral mid  The specimen consists of 2 cores of white to tan soft tissue   each with a diameter of less than 0 1 centimeters and measuring 0 6 and   1 6 centimeters in length  Due to the size of the smaller core, it is   questionable whether tissue will survive histologic processing  Entirely   submitted  One cassette  F  The specimen is received in formalin, labeled with the patient's name   and hospital number, and is designated Prostate needle biopsy left   lateral apex  The specimen consists of one core of white to tan soft   tissue with a diameter of less than 0 1 centimeters and measuring 1 9   centimeters in length  Entirely submitted  One cassette      G  The specimen is received in formalin, labeled with the patient's name   and hospital number, and is designated Prostate needle biopsy left   base  The specimen consists of one core of white to tan soft tissue with   a diameter of less than 0 1 centimeters and measuring 2 1 centimeters in   length  Entirely submitted  One cassette  H  The specimen is received in formalin, labeled with the patient's name   and hospital number, and is designated Prostate needle biopsy left mid  The specimen consists of 2 cores of white to tan soft tissue each with a   diameter of less than 0 1 centimeters and measuring 0 3 and 1 7   centimeters in length  Due to the size of the smaller core, it is   questionable whether tissue will survive histologic processing  Entirely   submitted  One cassette  I  The specimen is received in formalin, labeled with the patient's name   and hospital number, and is designated Prostate needle biopsy left   apex  The specimen consists of 2 cores of white to tan soft tissue each   with a diameter of less than 0 1 centimeters and measuring 0 5 and 1 6   centimeters in length  Entirely submitted  One cassette  J  The specimen is received in formalin, labeled with the patient's name   and hospital number, and is designated Prostate needle biopsy right   base  The specimen consists of one core of white to tan soft tissue with   a diameter of less than 0 1 centimeters and measuring 2 1 centimeters in   length  Entirely submitted  One cassette  K  The specimen is received in formalin, labeled with the patient's name   and hospital number, and is designated Prostate needle biopsy right   mid  The specimen consists of one core of white to tan soft tissue with a   diameter of less than 0 1 centimeters and measuring 2 2 centimeters in   length  Entirely submitted  One cassette  L  The specimen is received in formalin, labeled with the patient's name   and hospital number, and is designated Prostate needle biopsy right   apex   The specimen consists of 2 cores of white to tan to red soft tissue   each with a diameter of less than 0 1 centimeters and measuring 1 1 and   1 4 centimeters in length  Entirely submitted  One cassette  Collection time not given  Fixation time unknown      LMS

## 2018-01-11 NOTE — RESULT NOTES
Discussion/Summary   Dear [facility] ,      Your blood work is normal       Thank you      Verified Results  (1) [place] [id number] 08:44AM Marianela Reece   REPORT COMMENT:  FASTING:YES     Test Name Result Flag Reference   GLUCOSE 97 mg/dL  65-99   Fasting reference interval   UREA NITROGEN (BUN) 24 mg/dL  7-25   CREATININE 1 31 mg/dL  0 70-1 33   For patients >52years of age, the reference limit  for Creatinine is approximately 13% higher for people  identified as -American  eGFR NON-AFR  AMERICAN 63 mL/min/1 73m2  > OR = 60   eGFR AFRICAN AMERICAN 73 mL/min/1 73m2  > OR = 60   BUN/CREATININE RATIO   3-47   NOT APPLICABLE (calc)   SODIUM 140 mmol/L  135-146   POTASSIUM 4 1 mmol/L  3 5-5 3   CHLORIDE 107 mmol/L     CARBON DIOXIDE 28 mmol/L  20-31   CALCIUM 9 6 mg/dL  8 6-10 3       Signatures   Electronically signed by :  VAUGHN Clemente ; Oct 29 2017 10:10PM EST                       (Author)

## 2018-01-13 VITALS
HEART RATE: 76 BPM | WEIGHT: 210.5 LBS | DIASTOLIC BLOOD PRESSURE: 82 MMHG | HEIGHT: 67 IN | RESPIRATION RATE: 16 BRPM | TEMPERATURE: 98.2 F | SYSTOLIC BLOOD PRESSURE: 138 MMHG | BODY MASS INDEX: 33.04 KG/M2

## 2018-01-13 VITALS
TEMPERATURE: 97.3 F | BODY MASS INDEX: 33.18 KG/M2 | HEART RATE: 60 BPM | HEIGHT: 67 IN | DIASTOLIC BLOOD PRESSURE: 80 MMHG | RESPIRATION RATE: 16 BRPM | WEIGHT: 211.38 LBS | SYSTOLIC BLOOD PRESSURE: 122 MMHG

## 2018-01-13 VITALS
DIASTOLIC BLOOD PRESSURE: 80 MMHG | WEIGHT: 208.38 LBS | HEIGHT: 67 IN | HEART RATE: 80 BPM | RESPIRATION RATE: 16 BRPM | BODY MASS INDEX: 32.71 KG/M2 | TEMPERATURE: 97.6 F | SYSTOLIC BLOOD PRESSURE: 130 MMHG

## 2018-01-13 NOTE — CONSULTS
Chief Complaint    1  Elbow Pain  Chief Complaint Free Text Note Form: Patient presents for right elbow pain times past 2-3 months  History of Present Illness  HPI: Patient is a 51-year-old, right-hand-dominant male who presents for right elbow pain times past 2-3 months  He notes pain is located at the posterior elbow, is throbbing, nonradiating and is made worse with heavy lifting  And made better with gentle stretching  He further notes associated, occasional right shoulder pain times past 2-3 years  This pain is worse upon waking, especially after sleeping on ipsilateral side  He is currently seeking an appointment with Dr Igor Gunderson for evaluation of said shoulder  Patient reports he is an avid weightlifter and attends the gym 3-4 times per week  He denies any associated trauma, numbness, tingling fever or chills  The past medical history, surgical history, family history, social history, medications, allergies, and review of systems have been read and reviewed on the chart and have been updated  Review of Systems was recorded in the office today on a separate evaluation sheet and is listed below  Review of Systems  Focused-Male Orthopedics:   Constitutional: no fever, not feeling poorly, no chills and not feeling tired  Eyes: eyes not red and no eyesight problems  ENT: no hearing loss, no nosebleeds and no sore throat  Cardiovascular: no chest pain and no palpitations  Respiratory: no shortness of breath, no cough and no wheezing  Gastrointestinal: no abdominal pain and no constipation  Musculoskeletal: as noted in HPI  Integumentary: no rashes  Neurological: no numbness and no dizziness  Active Problems    1  Acute tonsillitis (463) (J03 90)   2  Allergic rhinitis (477 9) (J30 9)   3  Angiomyolipoma (223 0) (D17 9)   4  Benign hypertrophy of prostate (600 00) (N40 0)   5  Cellulitis of shaft of penis (607 2) (N48 22)   6  Colorectal polyps (211 3) (K63 5)   7   Creatinine elevation (790 99) (R79 89)   8  Elevated PSA (790 93) (R97 20)   9  Fatigue (780 79) (R53 83)   10  Hand pain, unspecified laterality   11  Hemangioma of skin (228 01) (D18 01)   12  Hypertension (401 9) (I10)   13  Insomnia (780 52) (G47 00)   14  Pain in elbow joint (719 42) (M25 529)   15  Rheumatoid factor positive (795 79) (R76 8)   16  Sinusitis (473 9) (J32 9)   17  Tension type headache (339 10) (G44 209)   18  Thrombocytopenia (287 5) (D69 6)   19  Urinary urgency (788 63) (R39 15)    Past Medical History    1  History of herpes simplex infection (V12 09) (Z86 19)   2  History of Normal echocardiogram   3  History of Normal nuclear stress test    Surgical History    1  History of Complete Colonoscopy    Family History    1  Family history of CABG   2  Family history of Diabetes Mellitus (V18 0)   3  Family history of Hypertension (V17 49)    4  Family history of Hypertension (V17 49)    5  Family history of Kidney Cancer (V16 51)   6  Family history of Prostate Cancer (V16 42)    Social History    · Marital History - Currently    · Never A Smoker    Current Meds   1  AmLODIPine Besylate 10 MG Oral Tablet; take one tablet by mouth every day; Therapy: 59LHN2429 to (FZORPCSL:42TZS6243)  Requested for: 79GGN4794; Last   Rx:84Uli1177 Ordered   2  Bystolic 10 MG Oral Tablet; TAKE 1 TABLET DAILY; Therapy: 15Lck8990 to (Evaluate:85Cmt3622)  Requested for: 81Vsi9892; Last   Rx:20Qfn8246 Ordered   3  Centrum Ultra Mens Oral Tablet; Take 1 tablet daily; Therapy: ([phone]) to Recorded   4  HydroCHLOROthiazide 12 5 MG Oral Tablet; take 1/2 tablet daily; Therapy: 75NWM2557 to (Richmond Khanna)  Requested for: 01Sep2017 Recorded   5  Lisinopril 2 5 MG Oral Tablet; take 1-2 tabs po qd; Therapy: 15Alh9791 to (Last Rx:22Rtu9736)  Requested for: 22Bip4815 Ordered   6  Turmeric Curcumin Oral Capsule; TAKE 6 CAPSULE DAILY; Therapy: 74Yah9568 to (Evaluate:12Oct2017) Recorded   7   Vitamin D3 2000 UNIT Oral Tablet; take 3 tablets daily; Therapy: 17GXT6737 to Recorded    Allergies    1  Penicillins   2  Avelox TABS    Vitals  Signs   Recorded: 04UUU2128 04:14PM   Heart Rate: 61  Systolic: 391  Diastolic: 79  Height: 5 ft 7 in  Weight: 211 lb   BMI Calculated: 33 05  BSA Calculated: 2 07    Physical Exam      General: No acute distress, age-appropriate  Neck: Supple, trachea midline  HEENT: Normocephalic atraumatic, mucous membranes are moist, sclera are nonicteric  Cardiovascular: No discernable arrhythmia  Respiratory: Breathing is even and unlabored, no stridor or audible wheezing  Psychiatric: Awake alert and oriented x3, normal mood and affect  Abdomen: Without rebound or guarding  Right Basic:   Right upper extremity: There is no gross deformity, edema, erythema or ecchymosis  There is mild to moderate point tenderness to palpation at the triceps insertion site  There is full elbow range of motion  Neurovascularly intact distally  R Elbow: Tenderness (Triceps insertion), but Full Range of Motion, No Valgus instability, No Varus Instability, No Posteromedial instability, No Posterolateral instability, Negative Pivot Shift Test, Negative Milking Test, Negative Valgus Hyperextension Test, Negative Hangar Sign, No Olecranon Bursitis and No Crepitus    Skin: was evaluated and demonstrated no masses, no abrasions, no erythema, no effusion, no edema, no fluctuance, no induration, no laceration, no ulceration, no lymphadenopathy  Right Upper Neurovascular: were evaluated and demonstrated: The patient has normal motor strength to the median, ulnar and radial nerve  Normal sensation to the median, ulnar, and radial nerve  Normal pulses in the radial and ulnar artery  Capillary refill is < 2 seconds  Results/Data  Diagnostic Studies Reviewed: I personally reviewed the films/images/results in the office today  My interpretation follows     X-ray Review X-rays of right elbow reveal no osseous abnormalities  Minimal arthritic change  Assessment    1  Tendinitis of right triceps (728 39) (M77 8)    Plan  Pain in elbow joint    1  * XR ELBOW 3+ VIEW RIGHT; Status:Active - Retrospective By Protocol Authorization; Requested [id number];   Tendinitis of right triceps    2  MethylPREDNISolone 4 MG Oral Tablet Therapy Pack (Medrol); take daily and   complete  as per instructions on Pack   3  Stretch and warm up your muscles during the first 10 minutes , then cool down your   muscles for the last 10 minutes of exercise ; Status:Complete;   Done: 95QZX8244    Discussion/Summary  Discussion Summary:   Patient seen and examined by Dr Darryl Melo, who developed the assessment and plan his care  I am acting as a scribe on his behalf  At this time, we will provide patient with a triceps strap  Discussed contraindications of corticosteroid injections for this condition  Weightbearing and activities as tolerated, advised caution when doing negative wraps for risk of tendon rupture  Patient referred to PT in office, to review home therapy program  We'll initiate a course of oral steroid  Call if condition worsens  Follow-up in 2 months for reevaluation        Future Appointments    Signatures   Electronically signed by : ALLIE Enriquez; Oct  4 2017  5:27PM EST                       (Author)    Electronically signed by : VAUGHN Fontana ; Oct  5 2017  3:01PM EST                       (Author)

## 2018-01-14 VITALS
WEIGHT: 210 LBS | BODY MASS INDEX: 32.96 KG/M2 | SYSTOLIC BLOOD PRESSURE: 136 MMHG | HEIGHT: 67 IN | DIASTOLIC BLOOD PRESSURE: 82 MMHG

## 2018-01-14 VITALS
SYSTOLIC BLOOD PRESSURE: 120 MMHG | WEIGHT: 210.25 LBS | DIASTOLIC BLOOD PRESSURE: 78 MMHG | BODY MASS INDEX: 32.93 KG/M2 | HEART RATE: 59 BPM

## 2018-01-14 VITALS
SYSTOLIC BLOOD PRESSURE: 156 MMHG | DIASTOLIC BLOOD PRESSURE: 98 MMHG | TEMPERATURE: 97.5 F | WEIGHT: 209.25 LBS | HEIGHT: 67 IN | HEART RATE: 80 BPM | BODY MASS INDEX: 32.84 KG/M2

## 2018-01-14 VITALS
BODY MASS INDEX: 32.96 KG/M2 | SYSTOLIC BLOOD PRESSURE: 138 MMHG | DIASTOLIC BLOOD PRESSURE: 89 MMHG | HEIGHT: 67 IN | WEIGHT: 210 LBS

## 2018-01-14 VITALS
BODY MASS INDEX: 33.12 KG/M2 | WEIGHT: 211 LBS | DIASTOLIC BLOOD PRESSURE: 79 MMHG | SYSTOLIC BLOOD PRESSURE: 123 MMHG | HEART RATE: 61 BPM | HEIGHT: 67 IN

## 2018-01-17 NOTE — RESULT NOTES
Verified Results  (1) CBC/PLT/DIFF [id number] 08:34AM Maria Victoria Akhtar     Test Name Result Flag Reference   WHITE BLOOD CELL COUNT 5 3 Thousand/uL  3 8-10 8   RED BLOOD CELL COUNT 5 21 Million/uL  4 20-5 80   HEMOGLOBIN 15 1 g/dL  13 2-17 1   HEMATOCRIT 45 1 %  38 5-50 0   MCV 86 5 fL  80 0-100 0   MCH 29 0 pg  27 0-33 0   MCHC 33 5 g/dL  32 0-36 0   RDW 13 5 %  11 0-15 0   PLATELET COUNT 579 Thousand/uL L 140-400   MPV 9 6 fL  7 5-11 5   ABSOLUTE NEUTROPHILS 2936 cells/uL  4136-3666   ABSOLUTE LYMPHOCYTES 1585 cells/uL  850-3900   ABSOLUTE MONOCYTES 594 cells/uL  200-950   ABSOLUTE EOSINOPHILS 148 cells/uL     ABSOLUTE BASOPHILS 37 cells/uL  0-200   NEUTROPHILS 55 4 %     LYMPHOCYTES 29 9 %     MONOCYTES 11 2 %     EOSINOPHILS 2 8 %     BASOPHILS 0 7 %       (1) COMPREHENSIVE METABOLIC PANEL [id number] 20:45LH Maria Victoria Akhtar     Test Name Result Flag Reference   GLUCOSE 96 mg/dL  65-99   Fasting reference interval   UREA NITROGEN (BUN) 18 mg/dL  7-25   CREATININE 1 25 mg/dL  0 70-1 33   For patients >52years of age, the reference limit  for Creatinine is approximately 13% higher for people  identified as -American  eGFR NON-AFR   AMERICAN 67 mL/min/1 73m2  > OR = 60   eGFR AFRICAN AMERICAN 77 mL/min/1 73m2  > OR = 60   BUN/CREATININE RATIO   4-99   NOT APPLICABLE (calc)   SODIUM 138 mmol/L  135-146   POTASSIUM 4 5 mmol/L  3 5-5 3   CHLORIDE 103 mmol/L     CARBON DIOXIDE 30 mmol/L  20-31   CALCIUM 8 9 mg/dL  8 6-10 3   PROTEIN, TOTAL 7 1 g/dL  6 1-8 1   ALBUMIN 4 2 g/dL  3 6-5 1   GLOBULIN 2 9 g/dL (calc)  1 9-3 7   ALBUMIN/GLOBULIN RATIO 1 4 (calc)  1 0-2 5   BILIRUBIN, TOTAL 0 7 mg/dL  0 2-1 2   ALKALINE PHOSPHATASE 39 U/L L    AST 21 U/L  10-35   ALT 29 U/L  9-46     (1) VITAMIN B12 [id number] 08:34AM Maria Victoria Akhtar     Test Name Result Flag Reference   VITAMIN B12 795 pg/mL  200-1100     (Q) LIPID PANEL WITH REFLEX TO DIRECT LDL [id number] 08:34AM Maria Victoria Longest Test Name Result Flag Reference   CHOLESTEROL, TOTAL 161 mg/dL  125-200   HDL CHOLESTEROL 49 mg/dL  > OR = 40   TRIGLICERIDES 59 mg/dL  <993   LDL-CHOLESTEROL 100 mg/dL (calc)  <130   Desirable range <100 mg/dL for patients with CHD or  diabetes and <70 mg/dL for diabetic patients with  known heart disease  CHOL/HDLC RATIO 3 3 (calc)  < OR = 5 0   NON HDL CHOLESTEROL 112 mg/dL (calc)     Target for non-HDL cholesterol is 30 mg/dL higher than   LDL cholesterol target  (Q) TSH, 3RD GENERATION [id number] 08:34AM Keshia Salvador     Test Name Result Flag Reference   TSH 1 39 mIU/L  0 40-4 50     *(Q) VITAMIN D, 25-HYDROXY, LC/MS/MS [id number] 08:34AM Keshia Salvador   REPORT COMMENT:  FASTING:YES     Test Name Result Flag Reference   VITAMIN D, 25-OH, TOTAL 62 ng/mL     Vitamin D Status         25-OH Vitamin D:     Deficiency:                    <20 ng/mL  Insufficiency:             20 - 29 ng/mL  Optimal:                 > or = 30 ng/mL     For 25-OH Vitamin D testing on patients on   D2-supplementation and patients for whom quantitation   of D2 and D3 fractions is required, the QuestAssureD(TM)  25-OH VIT D, (D2,D3), LC/MS/MS is recommended: order   code 16323 (patients >2yrs)  For more information on this test, go to:  [email or web address]  [email or web address]/VOV099  (This link is being provided for   informational/educational purposes only )       Discussion/Summary   Dear Diana Galvan,       All lab  work is fine aside from mild stable decrease of platelet count   Please contact me with any questions or concerns        Thank you

## 2018-01-17 NOTE — CONSULTS
Chief Complaint    1  Elbow Pain  Patient presents for right elbow pain times past 2-3 months  History of Present Illness  HPI: Patient is a 59-year-old, right-hand-dominant male who presents for right elbow pain times past 2-3 months  He notes pain is located at the posterior elbow, is throbbing, nonradiating and is made worse with heavy lifting  And made better with gentle stretching  He further notes associated, occasional right shoulder pain times past 2-3 years  This pain is worse upon waking, especially after sleeping on ipsilateral side  He is currently seeking an appointment with Dr Darlene Quesada for evaluation of said shoulder  Patient reports he is an avid weightlifter and attends the gym 3-4 times per week  He denies any associated trauma, numbness, tingling fever or chills  The past medical history, surgical history, family history, social history, medications, allergies, and review of systems have been read and reviewed on the chart and have been updated  Review of Systems was recorded in the office today on a separate evaluation sheet and is listed below  Review of Systems    Constitutional: no fever, not feeling poorly, no chills and not feeling tired  Eyes: eyes not red and no eyesight problems  ENT: no hearing loss, no nosebleeds and no sore throat  Cardiovascular: no chest pain and no palpitations  Respiratory: no shortness of breath, no cough and no wheezing  Gastrointestinal: no abdominal pain and no constipation  Musculoskeletal: as noted in HPI  Integumentary: no rashes  Neurological: no numbness and no dizziness  Active Problems    1  Acute tonsillitis (463) (J03 90)   2  Allergic rhinitis (477 9) (J30 9)   3  Angiomyolipoma (223 0) (D17 9)   4  Benign hypertrophy of prostate (600 00) (N40 0)   5  Cellulitis of shaft of penis (607 2) (N48 22)   6  Colorectal polyps (211 3) (K63 5)   7  Creatinine elevation (790 99) (R79 89)   8   Elevated PSA (790 93) (R97 20) 9  Fatigue (780 79) (R53 83)   10  Hand pain, unspecified laterality   11  Hemangioma of skin (228 01) (D18 01)   12  Hypertension (401 9) (I10)   13  Insomnia (780 52) (G47 00)   14  Pain in elbow joint (719 42) (M25 529)   15  Rheumatoid factor positive (795 79) (R76 8)   16  Sinusitis (473 9) (J32 9)   17  Tension type headache (339 10) (G44 209)   18  Thrombocytopenia (287 5) (D69 6)   19  Urinary urgency (788 63) (R39 15)    Past Medical History    · History of herpes simplex infection (V12 09) (Z86 19)   · History of Normal echocardiogram   · History of Normal nuclear stress test    Surgical History    · History of Complete Colonoscopy    Family History    · Family history of CABG   · Family history of Diabetes Mellitus (V18 0)   · Family history of Hypertension (V17 49)    · Family history of Hypertension (V17 49)    · Family history of Kidney Cancer (V16 51)   · Family history of Prostate Cancer (V16 42)    Social History    · Marital History - Currently    · Never A Smoker    Current Meds   1  AmLODIPine Besylate 10 MG Oral Tablet; take one tablet by mouth every day; Therapy: 72XHS7950 to (FJDPJLKX:59WBW0866)  Requested for: 24JIG7269; Last   Rx:[id number] Ordered   2  Bystolic 10 MG Oral Tablet; TAKE 1 TABLET DAILY; Therapy: 19Rtz0164 to (Evaluate:52Woy8995)  Requested for: 41Xbk3954; Last   Rx:[id number] Ordered   3  Centrum Ultra Mens Oral Tablet; Take 1 tablet daily; Therapy: ([phone]) to Recorded   4  HydroCHLOROthiazide 12 5 MG Oral Tablet; take 1/2 tablet daily; Therapy: 70PNZ4199 to (Elizabeth Curry)  Requested for: 20Eta0615 Recorded   5  Lisinopril 2 5 MG Oral Tablet; take 1-2 tabs po qd; Therapy: 88Kvj1275 to (Last Rx:16Lpg4281)  Requested for: 91Gdd8292 Ordered   6  Turmeric Curcumin Oral Capsule; TAKE 6 CAPSULE DAILY; Therapy: 54Rll1500 to (Evaluate:12Oct2017) Recorded   7  Vitamin D3 2000 UNIT Oral Tablet; take 3 tablets daily;    Therapy: 14ANE8127 to Recorded    Allergies    1  Penicillins   2  Avelox TABS    Vitals  Signs    Heart Rate: 02HNVQXATT: 563HEFMFOJAV: 98FNHTUK: 5 ft 7 inWeight: 211 lb BMI Calculated: 33 05BSA Calculated: 2 07    Physical Exam      General: No acute distress, age-appropriate  Neck: Supple, trachea midline  HEENT: Normocephalic atraumatic, mucous membranes are moist, sclera are nonicteric  Cardiovascular: No discernable arrhythmia  Respiratory: Breathing is even and unlabored, no stridor or audible wheezing  Psychiatric: Awake alert and oriented x3, normal mood and affect  Abdomen: Without rebound or guarding  Right Basic:   Right upper extremity: There is no gross deformity, edema, erythema or ecchymosis  There is mild to moderate point tenderness to palpation at the triceps insertion site  There is full elbow range of motion  Neurovascularly intact distally  R Elbow: Tenderness (Triceps insertion), but Full Range of Motion, No Valgus instability, No Varus Instability, No Posteromedial instability, No Posterolateral instability, Negative Pivot Shift Test, Negative Milking Test, Negative Valgus Hyperextension Test, Negative Hangar Sign, No Olecranon Bursitis and No Crepitus    Skin: was evaluated and demonstrated no masses, no abrasions, no erythema, no effusion, no edema, no fluctuance, no induration, no laceration, no ulceration, no lymphadenopathy  Right Upper Neurovascular: were evaluated and demonstrated: The patient has normal motor strength to the median, ulnar and radial nerve  Normal sensation to the median, ulnar, and radial nerve  Normal pulses in the radial and ulnar artery  Capillary refill is < 2 seconds  Results/Data  I personally reviewed the films/images/results in the office today  My interpretation follows  X-ray Review X-rays of right elbow reveal no osseous abnormalities  Minimal arthritic change  Assessment    1   Tendinitis of right triceps (248 39) (M77 8)    Plan     Pain in elbow joint    · * XR ELBOW 3+ VIEW RIGHT; Status:Active - Retrospective By Protocol Authorization; Requested for:04Oct2017;      Tendinitis of right triceps    · MethylPREDNISolone 4 MG Oral Tablet Therapy Pack (Medrol); take daily and  complete  as per instructions on Pack   · Stretch and warm up your muscles during the first 10 minutes , then cool down your  muscles for the last 10 minutes of exercise ; Status:Complete;   Done: 16TZW4875    Discussion/Summary    Patient seen and examined by Dr Yeny Zamora, who developed the assessment and plan his care  I am acting as a scribe on his behalf  At this time, we will provide patient with a triceps strap  Discussed contraindications of corticosteroid injections for this condition  Weightbearing and activities as tolerated, advised caution when doing negative wraps for risk of tendon rupture  Patient referred to PT in office, to review home therapy program  We'll initiate a course of oral steroid  Call if condition worsens  Follow-up in 2 months for reevaluation        Signatures   Electronically signed by : ALLIE Garza; Oct  4 2017  5:27PM EST                       (Author)    Electronically signed by : VAUGHN Salmeron ; Oct  5 2017  3:01PM EST                       (Author)

## 2018-01-19 ENCOUNTER — GENERIC CONVERSION - ENCOUNTER (OUTPATIENT)
Dept: OTHER | Facility: OTHER | Age: 53
End: 2018-01-19

## 2018-01-19 DIAGNOSIS — R79.89 OTHER SPECIFIED ABNORMAL FINDINGS OF BLOOD CHEMISTRY: ICD-10-CM

## 2018-01-22 VITALS
HEIGHT: 67 IN | SYSTOLIC BLOOD PRESSURE: 138 MMHG | WEIGHT: 211 LBS | BODY MASS INDEX: 33.12 KG/M2 | DIASTOLIC BLOOD PRESSURE: 79 MMHG

## 2018-01-22 VITALS
HEIGHT: 67 IN | TEMPERATURE: 96.7 F | RESPIRATION RATE: 16 BRPM | DIASTOLIC BLOOD PRESSURE: 82 MMHG | WEIGHT: 213.5 LBS | SYSTOLIC BLOOD PRESSURE: 122 MMHG | HEART RATE: 62 BPM | BODY MASS INDEX: 33.51 KG/M2

## 2018-01-22 VITALS
WEIGHT: 210 LBS | HEART RATE: 60 BPM | SYSTOLIC BLOOD PRESSURE: 143 MMHG | BODY MASS INDEX: 32.96 KG/M2 | DIASTOLIC BLOOD PRESSURE: 83 MMHG | HEIGHT: 67 IN

## 2018-01-23 VITALS
BODY MASS INDEX: 33.59 KG/M2 | HEIGHT: 67 IN | DIASTOLIC BLOOD PRESSURE: 84 MMHG | WEIGHT: 214 LBS | HEART RATE: 60 BPM | SYSTOLIC BLOOD PRESSURE: 133 MMHG

## 2018-01-23 NOTE — RESULT NOTES
Verified Results  (Q) LYME DISEASE ANTIBODIES (IGG, IGM) WESTERN [place]OT [id number] 11:51AM Gayle Cassidy     Test Name Result Flag Reference   LYME DISEASE AB (IGG) WB NEGATIVE  NEGATIVE   18 KD (IGG) BAND NON-REACTIVE     23 KD (IGG) BAND NON-REACTIVE     28 KD (IGG) BAND NON-REACTIVE     30 KD (IGG) BAND NON-REACTIVE     39 KD (IGG) BAND NON-REACTIVE     41 KD (IGG) BAND REACTIVE A    45 KD (IGG) BAND NON-REACTIVE     58 KD (IGG) BAND NON-REACTIVE     66 KD (IGG) BAND REACTIVE A    93 KD (IGG) BAND NON-REACTIVE     LYME DISEASE AB (IGM) WB NEGATIVE  NEGATIVE   23 KD (IGM) BAND NON-REACTIVE     39 KD (IGM) BAND NON-REACTIVE     41 KD (IGM) BAND NON-REACTIVE     As per CDC criteria, a Lyme disease IgG Immunoblot must  show reactivity to at least 5 of 10 specific borrelial  proteins to be considered positive; similarly, a   positive Lyme disease IgM immunoblot requires  reactivity to 2 of 3 specific borrelial proteins  Although considered negative, IgG reactivity to fewer  specific borrelial proteins or IgM reactivity to only  1 protein may indicate recent B  burgdorferi infection  and warrant testing of a later sample  A positive IgM  but negative IgG result obtained more than a month  after onset of symptoms likely represents a false-  positive IgM result rather than acute Lyme disease  In rare instances, Lyme disease immunoblot reactivity  may represent antibodies induced by exposure to other  spirochetes  (1) COMPREHENSIVE METABOLIC PANEL [id number] 56:84ZV Gayle Cassidy     Test Name Result Flag Reference   GLUCOSE 99 mg/dL  65-99   Fasting reference interval   UREA NITROGEN (BUN) 22 mg/dL  7-25   CREATININE 1 39 mg/dL H 0 70-1 33   For patients >52years of age, the reference limit  for Creatinine is approximately 13% higher for people  identified as -American  eGFR NON-AFR   AMERICAN 58 mL/min/1 73m2 L > OR = 60   eGFR AFRICAN AMERICAN 67 mL/min/1 73m2  > OR = 60   BUN/CREATININE RATIO 16 (calc)  6-22   SODIUM 139 mmol/L  135-146   POTASSIUM 4 1 mmol/L  3 5-5 3   CHLORIDE 104 mmol/L     CARBON DIOXIDE 30 mmol/L  20-31   CALCIUM 9 6 mg/dL  8 6-10 3   PROTEIN, TOTAL 7 5 g/dL  6 1-8 1   ALBUMIN 4 4 g/dL  3 6-5 1   GLOBULIN 3 1 g/dL (calc)  1 9-3 7   ALBUMIN/GLOBULIN RATIO 1 4 (calc)  1 0-2 5   BILIRUBIN, TOTAL 0 8 mg/dL  0 2-1 2   ALKALINE PHOSPHATASE 41 U/L     AST 24 U/L  10-35   ALT 31 U/L  9-46     (Q) SED RATE BY MODIFIED WESTERGREN [id number] 11:51AM Rafael Mevanesa     Test Name Result Flag Reference   SED RATE BY MODIFIED$WESTERGREN 2 mm/h  < OR = 20     (1) CBC/PLT/DIFF [id number] 11:51AM Rafael Johnston     Test Name Result Flag Reference   WHITE BLOOD CELL COUNT 5 4 Thousand/uL  3 8-10 8   RED BLOOD CELL COUNT 5 20 Million/uL  4 20-5 80   HEMOGLOBIN 15 4 g/dL  13 2-17 1   HEMATOCRIT 44 9 %  38 5-50 0   MCV 86 3 fL  80 0-100 0   MCH 29 6 pg  27 0-33 0   MCHC 34 3 g/dL  32 0-36 0   RDW 12 5 %  11 0-15 0   PLATELET COUNT 765 Thousand/uL L 140-400   ABSOLUTE NEUTROPHILS 2954 cells/uL  7999-1217   ABSOLUTE LYMPHOCYTES 1571 cells/uL  850-3900   ABSOLUTE MONOCYTES 697 cells/uL  200-950   ABSOLUTE EOSINOPHILS 130 cells/uL     ABSOLUTE BASOPHILS 49 cells/uL  0-200   NEUTROPHILS 54 7 %     LYMPHOCYTES 29 1 %     MONOCYTES 12 9 %     EOSINOPHILS 2 4 %     BASOPHILS 0 9 %     MPV 12 0 fL  7 5-12 5     (Q) ELHAM SCREEN, IFA, WITH REFLEX TO TITER AND PATTERN [id number] 11:51AM Rafael Mevanesa     Test Name Result Flag Reference   ELHAM SCREEN, IFA NEGATIVE  NEGATIVE   ELHAM IFA is a first line screen for detecting the  presence of up to approximately 150 autoantibodies in  various autoimmune diseases  A negative ELHAM IFA result  suggests ELHAM-associated autoimmune diseases are not  present at this time  Visit Physician FAQs for interpretation of all  antibodies in the [place], prevalence, and association  with diseases at [email or web address]  [email or web address]     (1) RF QUANTITATION [id number] 11:51AM Joe Nance     Test Name Result Flag Reference   RHEUMATOID FACTOR <14 IU/mL  <14     (1) C-REACTIVE PROTEIN [id number] 11:51AM Joe Nance     Test Name Result Flag Reference   C-REACTIVE PROTEIN 0 5 mg/L  <8 0     (Q) SJOGRENS ANTIBODIES (SS-A,SS-B) [id number] 11:51AM Joe Nance     Test Name Result Flag Reference   SJOGREN'S ANTIBODY (SS-A) <1 0 NEG AI  <1 0 NEG   SJOGREN'S ANTIBODY (SS-B) <1 0 NEG AI  <1 0 NEG     (Q) TSH, 3RD GENERATION [id number] 11:51AM Joe Nance     Test Name Result Flag Reference   TSH 2 49 mIU/L  0 40-4 50       Signatures   Electronically signed by :  VAUGHN White ; Dec 26 2017 10:03PM EST                       (Author)

## 2018-01-24 VITALS
RESPIRATION RATE: 14 BRPM | BODY MASS INDEX: 33.8 KG/M2 | HEIGHT: 67 IN | WEIGHT: 215.38 LBS | DIASTOLIC BLOOD PRESSURE: 84 MMHG | HEART RATE: 58 BPM | TEMPERATURE: 97.5 F | SYSTOLIC BLOOD PRESSURE: 124 MMHG

## 2018-01-24 VITALS — SYSTOLIC BLOOD PRESSURE: 118 MMHG | DIASTOLIC BLOOD PRESSURE: 78 MMHG

## 2018-01-30 ENCOUNTER — HOSPITAL ENCOUNTER (OUTPATIENT)
Dept: RADIOLOGY | Age: 53
Discharge: HOME | End: 2018-01-30

## 2018-01-30 DIAGNOSIS — M75.41 IMPINGEMENT SYNDROME OF RIGHT SHOULDER: ICD-10-CM

## 2018-01-31 ENCOUNTER — TRANSCRIBE ORDERS (OUTPATIENT)
Dept: LAB | Facility: CLINIC | Age: 53
End: 2018-01-31

## 2018-02-14 DIAGNOSIS — I10 ESSENTIAL HYPERTENSION: Primary | ICD-10-CM

## 2018-02-14 RX ORDER — NEBIVOLOL HYDROCHLORIDE 10 MG/1
TABLET ORAL
Qty: 90 TABLET | Refills: 2 | Status: SHIPPED | OUTPATIENT
Start: 2018-02-14 | End: 2018-03-05 | Stop reason: SDUPTHER

## 2018-02-15 ENCOUNTER — TELEPHONE (OUTPATIENT)
Dept: UROLOGY | Facility: AMBULATORY SURGERY CENTER | Age: 53
End: 2018-02-15

## 2018-02-15 NOTE — TELEPHONE ENCOUNTER
Patient has an appointment with Dr Dianna Jha and needs records of his Prostate BX done on 07 28 2017 and his most recent PSA  asap before his appointment tomorrow with this Dr  In [place] he advised he has been trying to fax over release form to  and it keeps coming back busy  I advised him to try again in a bit to re fax it and I will also send a message to Medical Records so we can get this processed for him he also provided the fax number which is  this is where he needs the records to be faxed to please and thank you

## 2018-03-02 RX ORDER — CHOLECALCIFEROL (VITAMIN D3) 125 MCG
1 CAPSULE ORAL DAILY
COMMUNITY
Start: 2017-07-14

## 2018-03-02 RX ORDER — SILDENAFIL 100 MG/1
TABLET, FILM COATED ORAL
Refills: 12 | COMMUNITY
Start: 2018-02-16 | End: 2019-01-19

## 2018-03-02 RX ORDER — AMLODIPINE BESYLATE 10 MG/1
1 TABLET ORAL DAILY
COMMUNITY
Start: 2015-07-23 | End: 2018-03-26 | Stop reason: SDUPTHER

## 2018-03-02 RX ORDER — HYDROCHLOROTHIAZIDE 12.5 MG/1
1 TABLET ORAL DAILY
COMMUNITY
Start: 2015-04-07 | End: 2018-03-05 | Stop reason: ALTCHOICE

## 2018-03-02 RX ORDER — NEBIVOLOL 5 MG/1
1 TABLET ORAL DAILY
COMMUNITY
Start: 2018-01-19 | End: 2018-03-26 | Stop reason: ALTCHOICE

## 2018-03-05 ENCOUNTER — OFFICE VISIT (OUTPATIENT)
Dept: FAMILY MEDICINE CLINIC | Facility: CLINIC | Age: 53
End: 2018-03-05
Payer: COMMERCIAL

## 2018-03-05 VITALS
RESPIRATION RATE: 20 BRPM | WEIGHT: 213.6 LBS | SYSTOLIC BLOOD PRESSURE: 130 MMHG | HEIGHT: 67 IN | TEMPERATURE: 97.3 F | BODY MASS INDEX: 33.53 KG/M2 | HEART RATE: 68 BPM | DIASTOLIC BLOOD PRESSURE: 78 MMHG

## 2018-03-05 DIAGNOSIS — I10 ESSENTIAL HYPERTENSION: Primary | ICD-10-CM

## 2018-03-05 DIAGNOSIS — M79.18 MYOFASCIAL PAIN SYNDROME: ICD-10-CM

## 2018-03-05 PROCEDURE — 99213 OFFICE O/P EST LOW 20 MIN: CPT | Performed by: FAMILY MEDICINE

## 2018-03-05 RX ORDER — NEBIVOLOL 10 MG/1
10 TABLET ORAL DAILY
Qty: 90 TABLET | Refills: 1 | Status: SHIPPED | OUTPATIENT
Start: 2018-03-05 | End: 2018-03-26 | Stop reason: SDUPTHER

## 2018-03-05 RX ORDER — ZINC GLUCONATE 50 MG
50 TABLET ORAL DAILY
COMMUNITY
End: 2018-05-07

## 2018-03-05 RX ORDER — LOSARTAN POTASSIUM 100 MG/1
100 TABLET ORAL DAILY
COMMUNITY
Start: 2018-03-02 | End: 2018-03-14 | Stop reason: ALTCHOICE

## 2018-03-05 RX ORDER — LISINOPRIL 2.5 MG/1
2.5 TABLET ORAL
COMMUNITY
End: 2018-03-05 | Stop reason: ALTCHOICE

## 2018-03-05 NOTE — PROGRESS NOTES
FAMILY PRACTICE OFFICE VISIT       NAME: Ahsan Cheung  AGE: 46 y o  SEX: male       : 1965        MRN: [id number]    DATE: 3/5/2018  TIME: 10:15 PM    Assessment and Plan     Problem List Items Addressed This Visit     Hypertension - Primary    Relevant Medications    nebivolol (BYSTOLIC) 5 mg tablet    amLODIPine (NORVASC) 10 mg tablet    losartan (COZAAR) 100 MG tablet    nebivolol (BYSTOLIC) 10 mg tablet    Other Relevant Orders    Basic metabolic panel    Aldosterone/Renin Ratio    Myofascial pain syndrome       Hypertension  Recent evaluation by Cardiology  Blood pressure is well controlled at present time  Patient has started Cozaar 100 mg daily as per Cardiology  History of mild creatinine elevation in the  past   Patient  feels well and is not experiencing any side effects on medication regimen at present time  Will repeat BMP along with aldosterone/renin ratio blood work within next 5-7 days  Patient has pending follow-up with Cardiology in 1 month  Will schedule follow-up in our office in 2 months  He remains  under care of Urology for stably elevated PSA  Unchanged symptoms of myalgias and arthralgias  Recent evaluation by Rheumatology, no evidence of connective tissue disease  There are no Patient Instructions on file for this visit  Chief Complaint     Chief Complaint   Patient presents with    Follow-up     Pt is here for follow up office visit        History of Present Illness     Follow-up hypertension  Patient was recently evaluated by [facility]  He has been noticing somewhat elevated Am B/Ps 139/88   Patientsaw Dr Chakraborty   3/2/18    Off  Lisinopril - on Cozaar  100 mg po qd   Urology : Follow up in summer 2018,  DR Vishal Henry   Patient still  Follows with [facility] urology  Total 4 8  And 1 26  Free PSA           Review of Systems   Review of Systems   Constitutional: Negative  Respiratory: Negative  Cardiovascular: Negative  Gastrointestinal: Negative  Genitourinary: Negative  Musculoskeletal: Positive for arthralgias  Psychiatric/Behavioral: Negative  Active Problem List     Patient Active Problem List   Diagnosis    Hypertension    Myofascial pain syndrome       Past Medical History:  Past Medical History:   Diagnosis Date    Herpes simplex     RESOLVED [id number]       Past Surgical History:  Past Surgical History:   Procedure Laterality Date    COLONOS[facility]  12/23/2015     [facility]       Family History:  Family History   Problem Relation Age of Onset    Other Mother      CABG    Diabetes Mother     Hypertension Mother     Hypertension Father     Kidney cancer Family     Prostate cancer Family        Social History:  Social History     Social History    Marital status: /Civil Union     Spouse name: N/A    Number of children: N/A    Years of education: N/A     Occupational History    Not on file  Social History Main Topics    Smoking status: Never Smoker    Smokeless tobacco: Never Used    Alcohol use No    Drug use: No    Sexual activity: Not on file     Other Topics Concern    Not on file     Social History Narrative    No narrative on file     I have reviewed the patient's medical history in detail; there are no changes to the history as noted in the electronic medical record  Objective     Vitals:    03/05/18 0849   BP: 130/78   Pulse: 68   Resp: 20   Temp: (!) 97 3 °F (36 3 °C)     Wt Readings from Last 3 Encounters:   03/05/18 96 9 kg (213 lb 9 6 oz)   01/19/18 97 7 kg (215 lb 6 oz)   12/21/17 95 3 kg (210 lb)       Physical Exam   Constitutional: He is oriented to person, place, and time  He appears well-developed and well-nourished  132/80  Rechecked B/P   HENT:   Head: Normocephalic and atraumatic  Cardiovascular: Normal rate, regular rhythm and normal heart sounds  No murmur heard    Pulmonary/Chest: Effort normal and breath sounds normal    Musculoskeletal: Normal range of motion  Neurological: He is alert and oriented to person, place, and time  Psychiatric: He has a normal mood and affect  Nursing note and vitals reviewed        Pertinent Laboratory/Diagnostic Studies:  Lab Results   Component Value Date    GLUCOSE 97 01/20/2014    BUN 22 12/21/2017    CREATININE 1 39 (H) 12/21/2017    CALCIUM 9 6 12/21/2017     12/21/2017    K 4 1 12/21/2017    CO2 30 12/21/2017     12/21/2017     Lab Results   Component Value Date    ALT 31 12/21/2017    AST 24 12/21/2017    ALKPHOS 41 12/21/2017    BILITOT 0 8 12/21/2017       Lab Results   Component Value Date    WBC 5 4 12/21/2017    HGB 15 4 12/21/2017    HCT 44 9 12/21/2017    MCV 86 3 12/21/2017     (L) 12/21/2017       No results found for: TSH    Lab Results   Component Value Date    CHOL 158 07/15/2017     Lab Results   Component Value Date    TRIG 49 07/15/2017     Lab Results   Component Value Date    HDL 50 07/15/2017     Lab Results   Component Value Date    LDLCALC 101 01/20/2014     No results found for: HGBA1C    Results for orders placed or performed in visit on 12/26/17   TSH, 3RD GENERATION (HISTORICAL)   Result Value Ref Range    TSH 3RD GENERATON 2 49 0 40 - 4 50 mIU/L   SEDIMENTATION RATE (HISTORICAL)   Result Value Ref Range    ERYTHROCYTE SEDIMENTATION RATE 2 < OR = 20 mm/h   SJOGREN'S ANTIBODIES (HISTORICAL)   Result Value Ref Range    SSA (RO) ANTIBODY <1 0 NEG <1 0 NEG AI    SSB (LA) ANTIBODY <1 0 NEG <1 0 NEG AI   ELHAM,SCREEN (HISTORICAL)   Result Value Ref Range    ANTI-NUCLEAR ANTIBODY (ELHAM) NEGATIVE NEGATIVE   RHEUMATOID FACTOR (HISTORICAL)   Result Value Ref Range    RHEUMATOID FACTOR <14 <14 IU/mL   COMPREHENSIVE METABOLIC PANEL (HISTORICAL)   Result Value Ref Range    Glucose 99 65 - 99 mg/dL    BUN 22 7 - 25 mg/dL    Creatinine 1 39 (H) 0 70 - 1 33 mg/dL    EGFR-AMERICAN CALC 58 (L) > OR = 60 mL/min/1 73m2    eGFR  67 > OR = 60 mL/min/1 73m2    BUN/CREA Ratio 16 6 - 22 (calc)    Sodium 139 135 - 146 mmol/L    Potassium 4 1 3 5 - 5 3 mmol/L    Chloride 104 98 - 110 mmol/L    CO2 30 20 - 31 mmol/L    Calcium 9 6 8 6 - 10 3 mg/dL    Total Protein 7 5 6 1 - 8 1 g/dL    Albumin 4 4 3 6 - 5 1 g/dL    GAMMA GLOBULIN 3 1 1 9 - 3 7 g/dL (calc)    A/G RATIO 1 4 1 0 - 2 5 (calc)    Total Bilirubin 0 8 0 2 - 1 2 mg/dL    Alkaline Phosphatase 41 40 - 115 U/L    AST 24 10 - 35 U/L    ALT 31 9 - 46 U/L   CBC AND DIFFERENTIAL (HISTORICAL)   Result Value Ref Range    WBC 5 4 3 8 - 10 8 Thousand/uL    RBC 5 20 4  20 - 5 80 Million/uL    Hemoglobin 15 4 13 2 - 17 1 g/dL    Hematocrit 44 9 38 5 - 50 0 %    MCV 86 3 80 0 - 100 0 fL    MCH 29 6 27 0 - 33 0 pg    MCHC 34 3 32 0 - 36 0 g/dL    Platelets 789 (L) 089 - 400 Thousand/uL    RDW 12 5 11 0 - 15 0 %    Neutrophils Absolute 2954 1500 - 7800 cells/uL    Lymphocytes Absolute 1571 850 - 3900 cells/uL    Monocytes Absolute 697 200 - 950 cells/uL    Eosinophils Absolute 130 15 - 500 cells/uL    Basophils Absolute 49 0 - 200 cells/uL    Neutrophils Absolute 54 7 %    Lymphocytes Absolute 29 1 %    MONOCYTES 12 9 %    Eosinophils Absolute 2 4 %    Basophils Relative 0 9 %    MPV 12 0 7 5 - 12 5 fL   C-REACTIVE PROTEIN (HISTORICAL)   Result Value Ref Range    C-REACTIVE PROTEIN 0 5 <8 0 mg/L       Orders Placed This Encounter   Procedures    Basic metabolic panel    Aldosterone/Renin Ratio       ALLERGIES:  Allergies   Allergen Reactions    Penicillins Anaphylaxis    Moxifloxacin Itching and Edema     Category:  Allergy;        Current Medications     Current Outpatient Prescriptions   Medication Sig Dispense Refill    amLODIPine (NORVASC) 10 mg tablet Take 1 tablet by mouth daily      Cholecalciferol (VITAMIN D3) 2000 units TABS Take 4 tablets by mouth daily        losartan (COZAAR) 100 MG tablet Take 100 mg by mouth daily        LYCOPENE PO Take 90 mg by mouth daily      Misc Natural Products (TURMERIC CURCUMIN) CAPS Take by mouth      Multiple Vitamins-Minerals (CENTRUM ULTRA MENS PO) Take 1 tablet by mouth daily      nebivolol (BYSTOLIC) 10 mg tablet Take 1 tablet (10 mg total) by mouth daily 90 tablet 1    sildenafil (VIAGRA) 100 mg tablet TAKE ONE TABLET BY MOUTH AS NEEDED ON AN EMPTY STOMACH  12    zinc gluconate 50 mg tablet Take 50 mg by mouth daily        nebivolol (BYSTOLIC) 5 mg tablet Take 1 tablet by mouth daily       No current facility-administered medications for this visit            Health Maintenance     Health Maintenance   Topic Date Due    HIV SCREENING  1965    DTaP,Tdap,and Td Vaccines (1 - Tdap) 12/07/1972    Depression Screening PHQ-9  12/07/1977    Hepatitis C Screening  Completed    INFLUENZA VACCINE  Completed     Immunization History   Administered Date(s) Administered    Influenza Quadrivalent Preservative Free 3 years and older IM 10/20/2017    Influenza TIV (IM) 08/20/2016       Papito Umanzor MD

## 2018-03-08 ENCOUNTER — TELEPHONE (OUTPATIENT)
Dept: FAMILY MEDICINE CLINIC | Facility: CLINIC | Age: 53
End: 2018-03-08

## 2018-03-08 NOTE — TELEPHONE ENCOUNTER
Re: The Basic Metabollic test, is it fasting or not? On the script it says fasting but we discussed it not being fasting  Please call to advise

## 2018-03-12 ENCOUNTER — TELEPHONE (OUTPATIENT)
Dept: FAMILY MEDICINE CLINIC | Facility: CLINIC | Age: 53
End: 2018-03-12

## 2018-03-12 DIAGNOSIS — I10 ESSENTIAL HYPERTENSION: Primary | ICD-10-CM

## 2018-03-12 NOTE — TELEPHONE ENCOUNTER
----- Message from Marilu Stephen MD sent at 3/12/2018  9:24 AM EDT -----  Please contact patient  I received his blood work  Creatinine is slightly elevated at 1 36  It is not uncommon to see mild creatinine bump with the start of losartan  I would advise patient to continue this medication daily as prescribed and repeat BMP again in 10-14 days    Thanks

## 2018-03-13 LAB
ALDOST SERPL-MCNC: 16 NG/DL
ALDOST/RENIN PLAS-RTO: 106.7 RATIO (ref 0.9–28.9)
BUN SERPL-MCNC: 22 MG/DL (ref 7–25)
BUN/CREAT SERPL: 16 (CALC) (ref 6–22)
CALCIUM SERPL-MCNC: 9.3 MG/DL (ref 8.6–10.3)
CHLORIDE SERPL-SCNC: 103 MMOL/L (ref 98–110)
CO2 SERPL-SCNC: 29 MMOL/L (ref 20–31)
CREAT SERPL-MCNC: 1.36 MG/DL (ref 0.7–1.33)
GLUCOSE SERPL-MCNC: 102 MG/DL (ref 65–99)
POTASSIUM SERPL-SCNC: 4.1 MMOL/L (ref 3.5–5.3)
RENIN PLAS-CCNC: 0.15 NG/ML/H (ref 0.25–5.82)
SL AMB EGFR AFRICAN AMERICAN: 69 ML/MIN/1.73M2
SL AMB EGFR NON AFRICAN AMERICAN: 59 ML/MIN/1.73M2
SODIUM SERPL-SCNC: 137 MMOL/L (ref 135–146)

## 2018-03-14 ENCOUNTER — TELEPHONE (OUTPATIENT)
Dept: FAMILY MEDICINE CLINIC | Facility: CLINIC | Age: 53
End: 2018-03-14

## 2018-03-14 DIAGNOSIS — E26.9 HYPERALDOSTERONISM (HCC): Primary | ICD-10-CM

## 2018-03-14 DIAGNOSIS — I10 ESSENTIAL HYPERTENSION: ICD-10-CM

## 2018-03-14 RX ORDER — LISINOPRIL 5 MG/1
5 TABLET ORAL
COMMUNITY
End: 2018-05-07

## 2018-03-14 NOTE — TELEPHONE ENCOUNTER
----- Message from Amber Chopra MD sent at 3/14/2018  3:51 PM EDT -----  Please contact patient  I received his blood work  His creatinine is slightly elevated at 1 36  I would like him to continue on the same medications for now and discuss it with Cardiology at his next office visit  Please also let patient know that he has elevated ratio of aldosterone to renin ; it is 106 within normal of 28  At this time, I strongly advised him to proceed with further evaluation by Nephrology, I will place orders    Thank you

## 2018-03-14 NOTE — TELEPHONE ENCOUNTER
----- Message from Amanda eLe MD sent at 3/14/2018  3:51 PM EDT -----  Please contact patient  I received his blood work  His creatinine is slightly elevated at 1 36  I would like him to continue on the same medications for now and discuss it with Cardiology at his next office visit  Please also let patient know that he has elevated ratio of aldosterone to renin ; it is 106 within normal of 28  At this time, I strongly advised him to proceed with further evaluation by Nephrology, I will place orders    Thank you

## 2018-03-14 NOTE — PROGRESS NOTES
I spoke with patient regarding blood work results  Creatinine is 1 36  Elevated ratio of aldosterone to renin  Patient reportedly was evaluated by Nephrology in the remote past, he saw Dr Jolene Tolliver or one of his associates  Patient reportedly has discontinued losartan due to side effects  He is back on lisinopril 2 5 mg daily along with Bystolic 5 mg twice a day and Norvasc 10 mg once a day  I strongly advised him to schedule follow-up with Nephrology  He understands and agrees  He will keep me posted regarding his blood pressure updates  I advised him to increase dose of lisinopril to 5 mg once a day

## 2018-03-26 DIAGNOSIS — I10 ESSENTIAL HYPERTENSION: Primary | ICD-10-CM

## 2018-03-26 RX ORDER — NEBIVOLOL 10 MG/1
10 TABLET ORAL DAILY
Qty: 90 TABLET | Refills: 0 | Status: SHIPPED | OUTPATIENT
Start: 2018-03-26 | End: 2018-05-07 | Stop reason: ALTCHOICE

## 2018-03-26 RX ORDER — AMLODIPINE BESYLATE 10 MG/1
10 TABLET ORAL DAILY
Qty: 90 TABLET | Refills: 0 | Status: SHIPPED | OUTPATIENT
Start: 2018-03-26 | End: 2018-06-22 | Stop reason: SDUPTHER

## 2018-04-04 ENCOUNTER — OFFICE VISIT (OUTPATIENT)
Dept: NEPHROLOGY | Facility: CLINIC | Age: 53
End: 2018-04-04
Payer: COMMERCIAL

## 2018-04-04 VITALS
HEART RATE: 66 BPM | DIASTOLIC BLOOD PRESSURE: 80 MMHG | BODY MASS INDEX: 32.96 KG/M2 | HEIGHT: 67 IN | SYSTOLIC BLOOD PRESSURE: 140 MMHG | WEIGHT: 210 LBS

## 2018-04-04 DIAGNOSIS — N40.0 BENIGN PROSTATIC HYPERPLASIA WITHOUT LOWER URINARY TRACT SYMPTOMS: ICD-10-CM

## 2018-04-04 DIAGNOSIS — E26.9 HYPERALDOSTERONISM (HCC): ICD-10-CM

## 2018-04-04 DIAGNOSIS — N18.30 CKD (CHRONIC KIDNEY DISEASE) STAGE 3, GFR 30-59 ML/MIN (HCC): ICD-10-CM

## 2018-04-04 DIAGNOSIS — I10 ESSENTIAL HYPERTENSION: Primary | ICD-10-CM

## 2018-04-04 LAB
SL AMB  POCT GLUCOSE, UA: NORMAL
SL AMB LEUKOCYTE ESTERASE,UA: NORMAL
SL AMB POCT BILIRUBIN,UA: NORMAL
SL AMB POCT BLOOD,UA: NORMAL
SL AMB POCT CLARITY,UA: CLEAR
SL AMB POCT COLOR,UA: YELLOW
SL AMB POCT KETONES,UA: NORMAL
SL AMB POCT NITRITE,UA: NORMAL
SL AMB POCT PH,UA: 5
SL AMB POCT SPECIFIC GRAVITY,UA: 1.01
SL AMB POCT URINE PROTEIN: NORMAL
SL AMB POCT UROBILINOGEN: 0.2

## 2018-04-04 PROCEDURE — 81002 URINALYSIS NONAUTO W/O SCOPE: CPT | Performed by: INTERNAL MEDICINE

## 2018-04-04 PROCEDURE — 99244 OFF/OP CNSLTJ NEW/EST MOD 40: CPT | Performed by: INTERNAL MEDICINE

## 2018-04-04 RX ORDER — LISINOPRIL 10 MG/1
10 TABLET ORAL DAILY
Qty: 60 TABLET | Refills: 3 | Status: SHIPPED | OUTPATIENT
Start: 2018-04-04 | End: 2018-05-01 | Stop reason: SDUPTHER

## 2018-04-04 NOTE — PATIENT INSTRUCTIONS
Renal function is stable at cr 1 3  Elevated aldosterone/renin ratio- will do saline suppression test    Follow up in 2 months  Blood work and urine test in 2 month

## 2018-04-04 NOTE — PROGRESS NOTES
[address] 46 y o  male MRN: [id number]  Date: 4/4/2018  Reason for consultation:   Chief Complaint   Patient presents with    Consult     hypertension, elevated aldosterone    Hypertension       ASSESSMENT AND PLAN:   Hypertension  -could be secondary vs primary  He has high aldosterone level of 16 and low renin level of 0 15 ng/ml/h  No hypokalemia  Will do saline suppression test to confirm true hyperaldosteronism  Pt will call back to schedule it  IF hyperaldosteronism confirmed with aldosterone level above10 ng/dl  Post IV saline, will consider further work up and starting on aldactone vs Eplerenone  Pt concerned about gynecomastia as side effects with aldactone  - he had negative renal duplex in 2016  TSH was wnl    - blood pressure is still elevated to 134W to 908 systolic  I have increased his lisinopril to 10 mg morning and 5 mg in the evening  He was previously taking lisinopril 5 mg in the morning and 5 mg in the afternoon  Continue low-salt diet as he has been doing  Continue amlodipine and Bystolic  Chronic kidney disease stage 3  - baseline creatine at 1 3  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  Renal function is stable  Recommend to avoid NSAIDs and other nephrotoxins     -  Repeat urine test and blood work before next visit  Check for proteinuria    Angiomyolipoma left upper pole   -  Renal ultrasound from 2014 showed small echoic lesion in the left upper renal pole measuring 5 mm but was not seen on the repeat renal u/s in 2015 was not seen  Also he has simple cyst in left kidney  Will consider repeat U/s next visit if CT abd not needed for hyperaldosteronism work up  BPH  - asymptomatic currently   He had work up with MRI prostate done in the past         visit orders;  Orders Placed This Encounter   Procedures    Renal function panel     Standing Status:   Future     Standing Expiration Date:   8/1/2018    Protein / creatinine ratio, urine     Standing Status:   Future     Standing Expiration Date:   8/1/2018    Urinalysis with reflex to microscopic     Standing Status:   Future     Standing Expiration Date:   8/1/2018    PTH, intact     Standing Status:   Future     Standing Expiration Date:   8/1/2018    POCT urine dip     Return in about 8 weeks (around 5/30/2018) for Recheck  HISTORY OF PRESENT ILLNESS:  Belkys William is a 46y o  year old male with medical issues of  Hypertension, chronic kidney disease, BPH, erectile dysfunction who presents for initial consultation for  Elevated aldosterone found during workup for uncontrolled hypertension  As per patient he has hypertension since 2011  But has not been well controlled  He could not tolerate Cozaar in the past, so he is on lisinopril  BP at home 140/95 am ,afternoon 150/95  Watching salt diet  Renal duplex from jan 2016 was negative  Creatinine has been around 1 3 since July 2017  Last blood work done on March 9th 2018 showed creatinine 1 36,   EGFR 59  Potassium 4 1  Bicarb 29  Aldosterone level was 16 ng/dl  Renin level was 0 15 ng/ml/hr  Aldosterone/renin ratio was elevated to 106 7  He had an ELHAM screen done in December 2017 which came back negative  TSH at that time was within normal range  REVIEW OF SYSTEMS:    Review of Systems   Constitutional: Negative for activity change, appetite change, chills, diaphoresis, fatigue and fever  HENT: Negative for congestion, facial swelling and nosebleeds  Eyes: Negative for pain and visual disturbance  Respiratory: Negative for cough, chest tightness and shortness of breath  Cardiovascular: Negative for chest pain and palpitations  Gastrointestinal: Negative for abdominal distention, abdominal pain, diarrhea, nausea and vomiting  Genitourinary: Negative for difficulty urinating, dysuria, flank pain, frequency and hematuria  Musculoskeletal: Negative for arthralgias, back pain and joint swelling  Skin: Negative for rash  Neurological: Negative for dizziness, seizures, syncope, weakness and headaches  Psychiatric/Behavioral: Negative for agitation and confusion  The patient is not nervous/anxious  More than 10 point review of systems were obtained and discussed in length with the patient  Complete review of systems were negative / unremarkable except mentioned in the HPI section  PHYSICAL EXAM:  Vitals:    04/04/18 1459   BP: 140/80   Pulse: 66   Weight: 95 3 kg (210 lb)   Height: 5' 7" (1 702 m)     Body mass index is 32 89 kg/m²  Physical Exam   Constitutional: He is oriented to person, place, and time  Vital signs are normal  He appears well-developed  No distress  HENT:   Head: Normocephalic and atraumatic  Mouth/Throat: Oropharynx is clear and moist    Eyes: Conjunctivae are normal  Pupils are equal, round, and reactive to light  No scleral icterus  Neck: Neck supple  No thyromegaly present  Cardiovascular: Normal rate, regular rhythm and normal heart sounds  Exam reveals no friction rub  No murmur heard  Pulmonary/Chest: Effort normal and breath sounds normal  No respiratory distress  He has no wheezes  He has no rales  Abdominal: Soft  Bowel sounds are normal  He exhibits no distension  There is no tenderness  Musculoskeletal: He exhibits no edema or deformity  Lymphadenopathy:     He has no cervical adenopathy  Neurological: He is alert and oriented to person, place, and time  He is not disoriented  Skin: He is not diaphoretic  No cyanosis  No pallor  Nails show no clubbing  Psychiatric: He has a normal mood and affect  His mood appears not anxious  His affect is not inappropriate  Vitals reviewed        PAST MEDICAL HISTORY:  Past Medical History:   Diagnosis Date    Benign prostatic hyperplasia     Herpes simplex     RESOLVED [id number]    Hypertension     Vitamin D deficiency        PAST SURGICAL HISTORY:  Past Surgical History:   Procedure Laterality Date    [facility]       ALLERGIES:  Allergies   Allergen Reactions    Penicillins Anaphylaxis    Moxifloxacin Itching and Edema     Category:  Allergy;        SOCIAL HISTORY:  History   Alcohol Use No     History   Drug Use No     History   Smoking Status    Never Smoker   Smokeless Tobacco    Never Used       FAMILY HISTORY:  Family History   Problem Relation Age of Onset    Other Mother      CABG    Diabetes Mother     Hypertension Mother     Hypertension Father     Kidney cancer Family     Prostate cancer Family     Hypertension Brother        MEDICATIONS:    Current Outpatient Prescriptions:     amLODIPine (NORVASC) 10 mg tablet, Take 1 tablet (10 mg total) by mouth daily, Disp: 90 tablet, Rfl: 0    Cholecalciferol (VITAMIN D3) 2000 units TABS, Take 2 tablets by mouth daily  , Disp: , Rfl:     lisinopril (ZESTRIL) 5 mg tablet, Take 5 mg by mouth daily at bedtime , Disp: , Rfl:     Misc Natural Products (TURMERIC CURCUMIN) CAPS, Take by mouth, Disp: , Rfl:     Multiple Vitamins-Minerals (CENTRUM ULTRA MENS PO), Take 1 tablet by mouth daily, Disp: , Rfl:     nebivolol (BYSTOLIC) 10 mg tablet, Take 1 tablet (10 mg total) by mouth daily, Disp: 90 tablet, Rfl: 0    zinc gluconate 50 mg tablet, Take 50 mg by mouth daily  , Disp: , Rfl:     lisinopril (ZESTRIL) 10 mg tablet, Take 1 tablet (10 mg total) by mouth daily, Disp: 60 tablet, Rfl: 3    sildenafil (VIAGRA) 100 mg tablet, TAKE ONE TABLET BY MOUTH AS NEEDED ON AN EMPTY STOMACH, Disp: , Rfl: 12    Lab Results:   Results for orders placed or performed in visit on 04/04/18   POCT urine dip   Result Value Ref Range    LEUKOCYTE ESTERASE,UA neg      NITRITE,UA neg     SL AMB POCT UROBILINOGEN 0 2     SL AMB POCT URINE PROTEIN neg      PH,UA 5 0      BLOOD,UA neg      SPECIFIC GRAVITY,UA 1 010      KETONES,UA neg      BILIRUBIN,UA neg     GLUCOSE, UA neg      COLOR,UA yellow      CLARITY,UA clear Portions of the record may have been created with voice recognition software  Occasional wrong word or "sound a like" substitutions may have occurred due to the inherent limitations of voice recognition software  Read the chart carefully and recognize, using context, where substitutions have occurred

## 2018-04-04 NOTE — LETTER
April 4, 2018     Vona Harada, MD  [address] [facility] [place] [address]    Patient: Marci Mooney   YOB: 1965   Date of Visit: 4/4/2018       Dear Dr Lisa Kong: Thank you for referring Keven Joaquin to me for evaluation  Below are my notes for this consultation  If you have questions, please do not hesitate to call me  I look forward to following your patient along with you  Sincerely,        Nima Chappell MD        CC: No Recipients  Nima Chappell MD  4/4/2018  4:19 PM  Sign at close encounter  [address] 46 y o  male MRN: [id number]  Date: 4/4/2018  Reason for consultation: No chief complaint on file  ASSESSMENT AND PLAN:   Hypertension  -could be secondary vs primary  He has high aldosterone level of 16 and low renin level of 0 15 ng/ml/h  No hypokalemia  Will do saline suppression test to confirm true hyperaldosteronism  Pt will call back to schedule it  IF hyperaldosteronism confirmed with aldosterone level above10 ng/dl, will consider further work up and starting on aldactone vs Eplerenone  Pt concerned about gynecomastia as side effects with aldactone  - he had negative renal duplex in 2016  TSH was wnl  Chronic kidney disease stage 3  - baseline creatine at 1 3  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     Angiomyolipoma left upper pole   -     BPH          HISTORY OF PRESENT ILLNESS:  Marci Mooney is a 46y o  year old male with medical issues of *** who presents for initial consultation for ***   creatinine has been around 1 3 since July 2017  Last blood work done on March 9th 2018 showed creatinine 1 36,   EGFR 59  Potassium 4 1  Bicarb 29  Aldosterone level was 16 ng/dl  Renin level was 0 15 ng/ml/hr  Aldosterone/renin ratio was elevated to 106 7  He had an [facility] screen done in December 2017 which came back negative  TSH at that time was within normal range  Hypertension x 6 yrs    BP at home 140/95 am ,afternoon 150/95  Watching salt diet  Renal duplex from jan 2016 was negative  REVIEW OF SYSTEMS:    Review of Systems    More than 10 point review of systems were obtained and discussed in length with the patient  Complete review of systems were negative / unremarkable except mentioned in the HPI section  PHYSICAL EXAM:  There were no vitals filed for this visit  There is no height or weight on file to calculate BMI  Physical Exam   Constitutional: He is oriented to person, place, and time  Vital signs are normal  He appears well-developed  No distress  HENT:   Head: Normocephalic and atraumatic  Mouth/Throat: Oropharynx is clear and moist    Eyes: Conjunctivae are normal  Pupils are equal, round, and reactive to light  No scleral icterus  Neck: Neck supple  No thyromegaly present  Cardiovascular: Normal rate, regular rhythm and normal heart sounds  Exam reveals no friction rub  No murmur heard  Pulmonary/Chest: Effort normal and breath sounds normal  No respiratory distress  He has no wheezes  He has no rales  Abdominal: Soft  Bowel sounds are normal  He exhibits no distension  There is no tenderness  Musculoskeletal: He exhibits no edema or deformity  Lymphadenopathy:     He has no cervical adenopathy  Neurological: He is alert and oriented to person, place, and time  He is not disoriented  Skin: He is not diaphoretic  No cyanosis  No pallor  Nails show no clubbing  Psychiatric: He has a normal mood and affect  His mood appears not anxious  His affect is not inappropriate  Vitals reviewed  PAST MEDICAL HISTORY:  Past Medical History:   Diagnosis Date    Herpes simplex     RESOLVED [id number]       PAST SURGICAL HISTORY:  Past Surgical History:   Procedure Laterality Date    COLONOSCOPY  12/23/2015     [facility]       ALLERGIES:  Allergies   Allergen Reactions    Penicillins Anaphylaxis    Moxifloxacin Itching and Edema     Category:  Allergy;        SOCIAL HISTORY:  History   Alcohol Use No     History   Drug Use No     History   Smoking Status    Never Smoker   Smokeless Tobacco    Never Used       FAMILY HISTORY:  Family History   Problem Relation Age of Onset    Other Mother      CABG    Diabetes Mother     Hypertension Mother     Hypertension Father     Kidney cancer Family     Prostate cancer Family        MEDICATIONS:    Current Outpatient Prescriptions:     amLODIPine (NORVASC) 10 mg tablet, Take 1 tablet (10 mg total) by mouth daily, Disp: 90 tablet, Rfl: 0    Cholecalciferol (VITAMIN D3) 2000 units TABS, Take 4 tablets by mouth daily  , Disp: , Rfl:     lisinopril (ZESTRIL) 5 mg tablet, Take 5 mg by mouth daily, Disp: , Rfl:     LYCOPENE PO, Take 90 mg by mouth daily, Disp: , Rfl:     Misc Natural Products (TURMERIC CURCUMIN) CAPS, Take by mouth, Disp: , Rfl:     Multiple Vitamins-Minerals (CENTRUM ULTRA MENS PO), Take 1 tablet by mouth daily, Disp: , Rfl:     nebivolol (BYSTOLIC) 10 mg tablet, Take 1 tablet (10 mg total) by mouth daily, Disp: 90 tablet, Rfl: 0    sildenafil (VIAGRA) 100 mg tablet, TAKE ONE TABLET BY MOUTH AS NEEDED ON AN EMPTY STOMACH, Disp: , Rfl: 12    zinc gluconate 50 mg tablet, Take 50 mg by mouth daily  , Disp: , Rfl:     Lab Results:   Results for orders placed or performed in visit on 32/93/37   Basic metabolic panel   Result Value Ref Range    SL AMB GLUCOSE 102 (H) 65 - 99 mg/dL    BUN 22 7 - 25 mg/dL    Creatinine, Serum 1 36 (H) 0 70 - 1 33 mg/dL    eGFR Non  59 (L) > OR = 60 mL/min/1 73m2    SL AMB EGFR  69 > OR = 60 mL/min/1 73m2    SL AMB BUN/CREATININE RATIO 16 6 - 22 (calc)    SL AMB SODIUM 137 135 - 146 mmol/L    SL AMB POTASSIUM 4 1 3 5 - 5 3 mmol/L    SL AMB CHLORIDE 103 98 - 110 mmol/L    SL AMB CARBON DIOXIDE 29 20 - 31 mmol/L    SL AMB CALCIUM 9 3 8 6 - 10 3 mg/dL   Aldosterone/Renin Ratio   Result Value Ref Range    Aldosterone 16 ng/dL    Renin 0 15 (L) 0 25 - 5 82 ng/mL/h    Aldos/Renin Ratio 106 7 (H) 0 9 - 28 9 Ratio       Portions of the record may have been created with voice recognition software  Occasional wrong word or "sound a like" substitutions may have occurred due to the inherent limitations of voice recognition software  Read the chart carefully and recognize, using context, where substitutions have occurred

## 2018-04-04 NOTE — LETTER
April 4, 2018     Toney Lares MD  [address] [facility] [address]    Patient: Miguelina Bush   YOB: 1965   Date of Visit: 4/4/2018       Dear Dr Meera Smith: Thank you for referring Brenda Perdomo to me for evaluation  Below are my notes for this consultation  If you have questions, please do not hesitate to call me  I look forward to following your patient along with you  Sincerely,        Mellisa Dupree MD        CC: No Recipients  Mellisa Dupree MD  4/4/2018  4:19 PM  Sign at close encounter  [address] 46 y o  male MRN: [id number]  Date: 4/4/2018  Reason for consultation: No chief complaint on file  ASSESSMENT AND PLAN:   Hypertension  -could be secondary vs primary  He has high aldosterone level of 16 and low renin level of 0 15 ng/ml/h  No hypokalemia  Will do saline suppression test to confirm true hyperaldosteronism  Pt will call back to schedule it  IF hyperaldosteronism confirmed with aldosterone level above10 ng/dl, will consider further work up and starting on aldactone vs Eplerenone  Pt concerned about gynecomastia as side effects with aldactone  - he had negative renal duplex in 2016  TSH was wnl  Chronic kidney disease stage 3  - baseline creatine at 1 3  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     Angiomyolipoma left upper pole   -     BPH          HISTORY OF PRESENT ILLNESS:  Miguelina Bush is a 46y o  year old male with medical issues of *** who presents for initial consultation for ***   creatinine has been around 1 3 since July 2017  Last blood work done on March 9th 2018 showed creatinine 1 36,   EGFR 59  Potassium 4 1  Bicarb 29  Aldosterone level was 16 ng/dl  Renin level was 0 15 ng/ml/hr  Aldosterone/renin ratio was elevated to 106 7  He had an [facility] screen done in December 2017 which came back negative  TSH at that time was within normal range  Hypertension x 6 yrs    BP at home 140/95 am ,afternoon 150/95  Watching salt diet  Renal duplex from jan 2016 was negative  REVIEW OF SYSTEMS:    Review of Systems    More than 10 point review of systems were obtained and discussed in length with the patient  Complete review of systems were negative / unremarkable except mentioned in the HPI section  PHYSICAL EXAM:  There were no vitals filed for this visit  There is no height or weight on file to calculate BMI  Physical Exam   Constitutional: He is oriented to person, place, and time  Vital signs are normal  He appears well-developed  No distress  HENT:   Head: Normocephalic and atraumatic  Mouth/Throat: Oropharynx is clear and moist    Eyes: Conjunctivae are normal  Pupils are equal, round, and reactive to light  No scleral icterus  Neck: Neck supple  No thyromegaly present  Cardiovascular: Normal rate, regular rhythm and normal heart sounds  Exam reveals no friction rub  No murmur heard  Pulmonary/Chest: Effort normal and breath sounds normal  No respiratory distress  He has no wheezes  He has no rales  Abdominal: Soft  Bowel sounds are normal  He exhibits no distension  There is no tenderness  Musculoskeletal: He exhibits no edema or deformity  Lymphadenopathy:     He has no cervical adenopathy  Neurological: He is alert and oriented to person, place, and time  He is not disoriented  Skin: He is not diaphoretic  No cyanosis  No pallor  Nails show no clubbing  Psychiatric: He has a normal mood and affect  His mood appears not anxious  His affect is not inappropriate  Vitals reviewed  PAST MEDICAL HISTORY:  Past Medical History:   Diagnosis Date    Herpes simplex     RESOLVED [id number]       PAST SURGICAL HISTORY:  Past Surgical History:   Procedure Laterality Date    COLONOSCOPY  12/23/2015     [facility]       ALLERGIES:  Allergies   Allergen Reactions    Penicillins Anaphylaxis    Moxifloxacin Itching and Edema     Category:  Allergy;        SOCIAL HISTORY:  History   Alcohol Use No     History   Drug Use No     History   Smoking Status    Never Smoker   Smokeless Tobacco    Never Used       FAMILY HISTORY:  Family History   Problem Relation Age of Onset    Other Mother      CABG    Diabetes Mother     Hypertension Mother     Hypertension Father     Kidney cancer Family     Prostate cancer Family        MEDICATIONS:    Current Outpatient Prescriptions:     amLODIPine (NORVASC) 10 mg tablet, Take 1 tablet (10 mg total) by mouth daily, Disp: 90 tablet, Rfl: 0    Cholecalciferol (VITAMIN D3) 2000 units TABS, Take 4 tablets by mouth daily  , Disp: , Rfl:     lisinopril (ZESTRIL) 5 mg tablet, Take 5 mg by mouth daily, Disp: , Rfl:     LYCOPENE PO, Take 90 mg by mouth daily, Disp: , Rfl:     Misc Natural Products (TURMERIC CURCUMIN) CAPS, Take by mouth, Disp: , Rfl:     Multiple Vitamins-Minerals (CENTRUM ULTRA MENS PO), Take 1 tablet by mouth daily, Disp: , Rfl:     nebivolol (BYSTOLIC) 10 mg tablet, Take 1 tablet (10 mg total) by mouth daily, Disp: 90 tablet, Rfl: 0    sildenafil (VIAGRA) 100 mg tablet, TAKE ONE TABLET BY MOUTH AS NEEDED ON AN EMPTY STOMACH, Disp: , Rfl: 12    zinc gluconate 50 mg tablet, Take 50 mg by mouth daily  , Disp: , Rfl:     Lab Results:   Results for orders placed or performed in visit on 19/83/87   Basic metabolic panel   Result Value Ref Range    SL AMB GLUCOSE 102 (H) 65 - 99 mg/dL    BUN 22 7 - 25 mg/dL    Creatinine, Serum 1 36 (H) 0 70 - 1 33 mg/dL    eGFR Non  59 (L) > OR = 60 mL/min/1 73m2    SL AMB EGFR  69 > OR = 60 mL/min/1 73m2    SL AMB BUN/CREATININE RATIO 16 6 - 22 (calc)    SL AMB SODIUM 137 135 - 146 mmol/L    SL AMB POTASSIUM 4 1 3 5 - 5 3 mmol/L    SL AMB CHLORIDE 103 98 - 110 mmol/L    SL AMB CARBON DIOXIDE 29 20 - 31 mmol/L    SL AMB CALCIUM 9 3 8 6 - 10 3 mg/dL   Aldosterone/Renin Ratio   Result Value Ref Range    Aldosterone 16 ng/dL    Renin 0 15 (L) 0 25 - 5 82 ng/mL/h    Aldos/Renin Ratio 106 7 (H) 0 9 - 28 9 Ratio       Portions of the record may have been created with voice recognition software  Occasional wrong word or "sound a like" substitutions may have occurred due to the inherent limitations of voice recognition software  Read the chart carefully and recognize, using context, where substitutions have occurred

## 2018-04-04 NOTE — LETTER
April 4, 2018     Thiago Rinaldi MD  [address] [facility] [address] [address]    Patient: Hoa Taylor   YOB: 1965   Date of Visit: 4/4/2018       Dear Dr Jeff Madrid: Thank you for referring Lucia Elliott to me for evaluation  Below are my notes for this consultation  If you have questions, please do not hesitate to call me  I look forward to following your patient along with you  Sincerely,        Chon Bain MD        CC: No Recipients  Chon Bain MD  4/4/2018  5:25 PM  Sign at close encounter  [address] 46 y o  male MRN: [id number]  Date: 4/4/2018  Reason for consultation:   Chief Complaint   Patient presents with    Consult     hypertension, elevated aldosterone    Hypertension       ASSESSMENT AND PLAN:   Hypertension  -could be secondary vs primary  He has high aldosterone level of 16 and low renin level of 0 15 ng/ml/h  No hypokalemia  Will do saline suppression test to confirm true hyperaldosteronism  Pt will call back to schedule it  IF hyperaldosteronism confirmed with aldosterone level above10 ng/dl  Post IV saline, will consider further work up and starting on aldactone vs Eplerenone  Pt concerned about gynecomastia as side effects with aldactone  - he had negative renal duplex in 2016  TSH was wnl    - blood pressure is still elevated to 378P to 797 systolic  I have increased his lisinopril to 10 mg morning and 5 mg in the evening  He was previously taking lisinopril 5 mg in the morning and 5 mg in the afternoon  Continue low-salt diet as he has been doing  Continue amlodipine and Bystolic  Chronic kidney disease stage 3  - baseline creatine at 1 3  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  Renal function is stable  Recommend to avoid NSAIDs and other nephrotoxins     -  Repeat urine test and blood work before next visit    Check for proteinuria    Angiomyolipoma left upper pole   -  Renal ultrasound from 2014 showed small echoic lesion in the left upper renal pole measuring 5 mm but was not seen on the repeat renal u/s in 2015 was not seen  Also he has simple cyst in left kidney  Will consider repeat U/s next visit if CT abd not needed for hyperaldosteronism work up  BPH  - asymptomatic currently  He had work up with MRI prostate done in the past         visit orders;  Orders Placed This Encounter   Procedures    Renal function panel     Standing Status:   Future     Standing Expiration Date:   8/1/2018    Protein / creatinine ratio, urine     Standing Status:   Future     Standing Expiration Date:   8/1/2018    Urinalysis with reflex to microscopic     Standing Status:   Future     Standing Expiration Date:   8/1/2018    PTH, intact     Standing Status:   Future     Standing Expiration Date:   8/1/2018    POCT urine dip     Return in about 8 weeks (around 5/30/2018) for Recheck  HISTORY OF PRESENT ILLNESS:  Lorrie Sykes is a 46y o  year old male with medical issues of  Hypertension, chronic kidney disease, BPH, erectile dysfunction who presents for initial consultation for  Elevated aldosterone found during workup for uncontrolled hypertension  As per patient he has hypertension since 2011  But has not been well controlled  He could not tolerate Cozaar in the past, so he is on lisinopril  BP at home 140/95 am ,afternoon 150/95  Watching salt diet  Renal duplex from jan 2016 was negative  Creatinine has been around 1 3 since July 2017  Last blood work done on March 9th 2018 showed creatinine 1 36,   EGFR 59  Potassium 4 1  Bicarb 29  Aldosterone level was 16 ng/dl  Renin level was 0 15 ng/ml/hr  Aldosterone/renin ratio was elevated to 106 7  He had an [facility] screen done in December 2017 which came back negative  TSH at that time was within normal range  REVIEW OF SYSTEMS:    Review of Systems   Constitutional: Negative for activity change, appetite change, chills, diaphoresis, fatigue and fever  HENT: Negative for congestion, facial swelling and nosebleeds  Eyes: Negative for pain and visual disturbance  Respiratory: Negative for cough, chest tightness and shortness of breath  Cardiovascular: Negative for chest pain and palpitations  Gastrointestinal: Negative for abdominal distention, abdominal pain, diarrhea, nausea and vomiting  Genitourinary: Negative for difficulty urinating, dysuria, flank pain, frequency and hematuria  Musculoskeletal: Negative for arthralgias, back pain and joint swelling  Skin: Negative for rash  Neurological: Negative for dizziness, seizures, syncope, weakness and headaches  Psychiatric/Behavioral: Negative for agitation and confusion  The patient is not nervous/anxious  More than 10 point review of systems were obtained and discussed in length with the patient  Complete review of systems were negative / unremarkable except mentioned in the HPI section  PHYSICAL EXAM:  Vitals:    04/04/18 1459   BP: 140/80   Pulse: 66   Weight: 95 3 kg (210 lb)   Height: 5' 7" (1 702 m)     Body mass index is 32 89 kg/m²  Physical Exam   Constitutional: He is oriented to person, place, and time  Vital signs are normal  He appears well-developed  No distress  HENT:   Head: Normocephalic and atraumatic  Mouth/Throat: Oropharynx is clear and moist    Eyes: Conjunctivae are normal  Pupils are equal, round, and reactive to light  No scleral icterus  Neck: Neck supple  No thyromegaly present  Cardiovascular: Normal rate, regular rhythm and normal heart sounds  Exam reveals no friction rub  No murmur heard  Pulmonary/Chest: Effort normal and breath sounds normal  No respiratory distress  He has no wheezes  He has no rales  Abdominal: Soft  Bowel sounds are normal  He exhibits no distension  There is no tenderness  Musculoskeletal: He exhibits no edema or deformity  Lymphadenopathy:     He has no cervical adenopathy     Neurological: He is alert and oriented to person, place, and time  He is not disoriented  Skin: He is not diaphoretic  No cyanosis  No pallor  Nails show no clubbing  Psychiatric: He has a normal mood and affect  His mood appears not anxious  His affect is not inappropriate  Vitals reviewed  PAST MEDICAL HISTORY:  Past Medical History:   Diagnosis Date    Benign prostatic hyperplasia     Herpes simplex     RESOLVED [id number]    Hypertension     Vitamin D deficiency        PAST SURGICAL HISTORY:  Past Surgical History:   Procedure Laterality Date    [facility]       ALLERGIES:  Allergies   Allergen Reactions    Penicillins Anaphylaxis    Moxifloxacin Itching and Edema     Category:  Allergy;        SOCIAL HISTORY:  History   Alcohol Use No     History   Drug Use No     History   Smoking Status    Never Smoker   Smokeless Tobacco    Never Used       FAMILY HISTORY:  Family History   Problem Relation Age of Onset    Other Mother      CABG    Diabetes Mother     Hypertension Mother     Hypertension Father     Kidney cancer Family     Prostate cancer Family     Hypertension Brother        MEDICATIONS:    Current Outpatient Prescriptions:     amLODIPine (NORVASC) 10 mg tablet, Take 1 tablet (10 mg total) by mouth daily, Disp: 90 tablet, Rfl: 0    Cholecalciferol (VITAMIN D3) 2000 units TABS, Take 2 tablets by mouth daily  , Disp: , Rfl:     lisinopril (ZESTRIL) 5 mg tablet, Take 5 mg by mouth daily at bedtime , Disp: , Rfl:     Misc Natural Products (TURMERIC CURCUMIN) CAPS, Take by mouth, Disp: , Rfl:     Multiple Vitamins-Minerals (CENTRUM ULTRA MENS PO), Take 1 tablet by mouth daily, Disp: , Rfl:     nebivolol (BYSTOLIC) 10 mg tablet, Take 1 tablet (10 mg total) by mouth daily, Disp: 90 tablet, Rfl: 0    zinc gluconate 50 mg tablet, Take 50 mg by mouth daily  , Disp: , Rfl:     lisinopril (ZESTRIL) 10 mg tablet, Take 1 tablet (10 mg total) by mouth daily, Disp: 60 tablet, Rfl: 3   sildenafil (VIAGRA) 100 mg tablet, TAKE ONE TABLET BY MOUTH AS NEEDED ON AN EMPTY STOMACH, Disp: , Rfl: 12    Lab Results:   Results for orders placed or performed in visit on 04/04/18   POCT urine dip   Result Value Ref Range    LEUKOCYTE ESTERASE,UA neg      NITRITE,UA neg     SL AMB POCT UROBILINOGEN 0 2     SL AMB POCT URINE PROTEIN neg      PH,UA 5 0      BLOOD,UA neg      SPECIFIC GRAVITY,UA 1 010      KETONES,UA neg      BILIRUBIN,UA neg     GLUCOSE, UA neg      COLOR,UA yellow      CLARITY,UA clear        Portions of the record may have been created with voice recognition software  Occasional wrong word or "sound a like" substitutions may have occurred due to the inherent limitations of voice recognition software  Read the chart carefully and recognize, using context, where substitutions have occurred

## 2018-04-07 ENCOUNTER — HOSPITAL ENCOUNTER (OUTPATIENT)
Dept: INFUSION CENTER | Facility: CLINIC | Age: 53
Discharge: HOME | End: 2018-04-07
Payer: COMMERCIAL

## 2018-04-07 VITALS
DIASTOLIC BLOOD PRESSURE: 86 MMHG | TEMPERATURE: 98.4 F | OXYGEN SATURATION: 97 % | SYSTOLIC BLOOD PRESSURE: 138 MMHG | RESPIRATION RATE: 16 BRPM | HEART RATE: 68 BPM

## 2018-04-07 PROCEDURE — 84244 ASSAY OF RENIN: CPT | Performed by: INTERNAL MEDICINE

## 2018-04-07 PROCEDURE — 82088 ASSAY OF ALDOSTERONE: CPT | Performed by: INTERNAL MEDICINE

## 2018-04-07 RX ADMIN — SODIUM CHLORIDE 1000 ML: 0.9 INJECTION, SOLUTION INTRAVENOUS at 08:35

## 2018-04-07 RX ADMIN — SODIUM CHLORIDE 1000 ML: 0.9 INJECTION, SOLUTION INTRAVENOUS at 10:40

## 2018-04-07 NOTE — PLAN OF CARE
Problem: Potential for Falls  Goal: Patient will remain free of falls  INTERVENTIONS:  - Assess patient frequently for physical needs  -  Identify cognitive and physical deficits and behaviors that affect risk of falls    -  [place] fall precautions as indicated by assessment   - Educate patient/family on patient safety including physical limitations  - Instruct patient to call for assistance with activity based on assessment  - Modify environment to reduce risk of injury  - Consider OT/PT consult to assist with strengthening/mobility   Outcome: Progressing      Problem: PAIN - ADULT  Goal: Verbalizes/displays adequate comfort level or baseline comfort level  Interventions:  - Encourage patient to monitor pain and request assistance  - Assess pain using appropriate pain scale  - Administer analgesics based on type and severity of pain and evaluate response  - Implement non-pharmacological measures as appropriate and evaluate response  - Consider cultural and social influences on pain and pain management  - Notify physician/advanced practitioner if interventions unsuccessful or patient reports new pain  Outcome: Progressing      Problem: INFECTION - ADULT  Goal: Absence or prevention of progression during hospitalization  INTERVENTIONS:  - Assess and monitor for signs and symptoms of infection  - Monitor lab/diagnostic results  - Monitor all insertion sites, i e  indwelling lines, tubes, and drains  - Monitor endotracheal (as able) and nasal secretions for changes in amount and color  - [place] appropriate cooling/warming therapies per order  - Administer medications as ordered  - Instruct and encourage patient and family to use good hand hygiene technique  - Identify and instruct in appropriate isolation precautions for identified infection/condition  Outcome: Progressing

## 2018-04-07 NOTE — PROGRESS NOTES
Patient tolerated saline suppression test without incident, labs drawn post as ordered and patient discharged  He offers no c/o and no further appointments scheduled at this time

## 2018-04-07 NOTE — PROGRESS NOTES
Patient here for saline suppression test and is doing well, he offers no c/o  Tolerated lab draw as ordered via RH IV, saline infusing as ordered

## 2018-04-11 ENCOUNTER — TELEPHONE (OUTPATIENT)
Dept: NEPHROLOGY | Facility: CLINIC | Age: 53
End: 2018-04-11

## 2018-04-12 LAB
ALDOST SERPL-MCNC: 4.5 NG/DL (ref 0–30)
ALDOST SERPL-MCNC: 7.1 NG/DL (ref 0–30)

## 2018-04-13 ENCOUNTER — TELEPHONE (OUTPATIENT)
Dept: NEPHROLOGY | Facility: CLINIC | Age: 53
End: 2018-04-13

## 2018-04-13 ENCOUNTER — TELEPHONE (OUTPATIENT)
Dept: OTHER | Facility: HOSPITAL | Age: 53
End: 2018-04-13

## 2018-04-13 LAB
RENIN PLAS-CCNC: 0.18 NG/ML/HR (ref 0.17–5.38)
RENIN PLAS-CCNC: <0.167 NG/ML/HR (ref 0.17–5.38)

## 2018-04-13 NOTE — TELEPHONE ENCOUNTER
Unable to reach patient on the phone  Left message   saline suppression test results reviewed  Initial Aldosterone/ renin ratio was 7 1/0 18= 39 44 and after the saline administration aldosterone/renin was 4 5/0 167=26 94  As aldosterone level less than 15 ng /dl and further decreased with iv saline and as there is no hypokalemia, patient is on betablocker which could lower renin concentration- I feel patient does not have primary hyperaldosteronism  Would continue current antihypertensive medication

## 2018-04-16 ENCOUNTER — TELEPHONE (OUTPATIENT)
Dept: NEPHROLOGY | Facility: CLINIC | Age: 53
End: 2018-04-16

## 2018-04-16 DIAGNOSIS — I10 ESSENTIAL HYPERTENSION: Primary | ICD-10-CM

## 2018-04-16 RX ORDER — CHLORTHALIDONE 25 MG/1
25 TABLET ORAL DAILY
Qty: 30 TABLET | Refills: 3 | Status: SHIPPED | OUTPATIENT
Start: 2018-04-16 | End: 2018-05-07

## 2018-04-16 NOTE — TELEPHONE ENCOUNTER
BP still elevated in 633'B systolic  Pt has increased lisinopril to 10 mg bid and extra 5 mg at night and also taking bystolic 10 mg bid  Will start on chlorthalidone 25 mg daily and check renal panel in 2 weeks  I told him not to take lisinopril 10 mg bid   Asked to inform me if BP still elevated

## 2018-04-16 NOTE — TELEPHONE ENCOUNTER
Patient does not want to take chlorthalidone as he is scared that he may be up all night going to bathroom  He mentions he has taken HCTZ in the past and his blood pressure was well controlled with  Hydrochlorothiazide 12 5 mg once daily  After discussing with the patient, final plan is to continue hydrochlorothiazide 12 5 mg daily  Monitor blood pressure  Check renal panel in 2-3 weeks to monitor for hypokalemia  I offered him to start Flomax for enlarged prostate  He does not want any additional medicines at this time

## 2018-04-16 NOTE — TELEPHONE ENCOUNTER
Please clarify current dose of Bystolic with patient  Last time I spoke with him we he was using 5 milligram twice a day  Today have a refill request for 5 milligrams once a day    Thank you

## 2018-04-17 RX ORDER — NEBIVOLOL HYDROCHLORIDE 5 MG/1
TABLET ORAL
Qty: 90 TABLET | Refills: 0 | Status: SHIPPED | OUTPATIENT
Start: 2018-04-17 | End: 2019-01-19

## 2018-04-26 DIAGNOSIS — I10 HYPERTENSION, UNSPECIFIED TYPE: ICD-10-CM

## 2018-04-26 DIAGNOSIS — Z01.89 ENCOUNTER FOR TOBACCO USE SCREENING: ICD-10-CM

## 2018-04-26 DIAGNOSIS — Z13.220 SCREENING CHOLESTEROL LEVEL: Primary | ICD-10-CM

## 2018-04-29 LAB
ALBUMIN SERPL-MCNC: 4.5 G/DL (ref 3.6–5.1)
BUN SERPL-MCNC: 20 MG/DL (ref 7–25)
BUN/CREAT SERPL: 14 (CALC) (ref 6–22)
CALCIUM SERPL-MCNC: 9.7 MG/DL (ref 8.6–10.3)
CHLORIDE SERPL-SCNC: 102 MMOL/L (ref 98–110)
CO2 SERPL-SCNC: 28 MMOL/L (ref 20–31)
CREAT SERPL-MCNC: 1.4 MG/DL (ref 0.7–1.33)
GLUCOSE SERPL-MCNC: 93 MG/DL (ref 65–99)
PHOSPHATE SERPL-MCNC: 3 MG/DL (ref 2.5–4.5)
POTASSIUM SERPL-SCNC: 4.1 MMOL/L (ref 3.5–5.3)
SL AMB EGFR AFRICAN AMERICAN: 66 ML/MIN/1.73M2
SL AMB EGFR NON AFRICAN AMERICAN: 57 ML/MIN/1.73M2
SODIUM SERPL-SCNC: 137 MMOL/L (ref 135–146)

## 2018-04-30 ENCOUNTER — TELEPHONE (OUTPATIENT)
Dept: NEPHROLOGY | Facility: CLINIC | Age: 53
End: 2018-04-30

## 2018-04-30 NOTE — TELEPHONE ENCOUNTER
Renal panel reviewed  Creatinine is stable and potassium within normal range  Left message   To call back if blood pressure is elevated

## 2018-05-01 ENCOUNTER — TELEPHONE (OUTPATIENT)
Dept: NEPHROLOGY | Facility: CLINIC | Age: 53
End: 2018-05-01

## 2018-05-01 DIAGNOSIS — I10 ESSENTIAL HYPERTENSION: Primary | ICD-10-CM

## 2018-05-01 RX ORDER — LISINOPRIL 10 MG/1
10 TABLET ORAL 2 TIMES DAILY
Qty: 180 TABLET | Refills: 3 | Status: SHIPPED | OUTPATIENT
Start: 2018-05-01 | End: 2019-01-19

## 2018-05-01 NOTE — TELEPHONE ENCOUNTER
----- Message from Mckenna Larios sent at 5/1/2018  9:17 AM EDT -----  You had left a message regarding test results  Pt has additional questions    Please call when you are able  [phone]

## 2018-05-01 NOTE — TELEPHONE ENCOUNTER
Spoke with patient earlier today  His blood pressure is better controlled since starting HCTZ 12 5 mg daily  He is on lisinopril 10 mg twice daily which was refilled today  He mentions he has been adjusting his Bystolic  And has decreased the dose as his blood pressure has been well controlled  Asked him to restart lower dose of bystolic if BP trends up  Continue hctz 12 5 mg daily   Slight increase in creatinine is likely due to increased dose of ACEI- discussed with pt about it

## 2018-05-03 LAB
BUN SERPL-MCNC: 20 MG/DL (ref 7–25)
BUN/CREAT SERPL: 14 (CALC) (ref 6–22)
CALCIUM SERPL-MCNC: 9.5 MG/DL (ref 8.6–10.3)
CHLORIDE SERPL-SCNC: 101 MMOL/L (ref 98–110)
CHOLEST SERPL-MCNC: 154 MG/DL
CHOLEST/HDLC SERPL: 2.8 (CALC)
CO2 SERPL-SCNC: 28 MMOL/L (ref 20–31)
COTININE UR QL: NORMAL
CREAT SERPL-MCNC: 1.38 MG/DL (ref 0.7–1.33)
EST. AVERAGE GLUCOSE BLD GHB EST-MCNC: 97 (CALC)
EST. AVERAGE GLUCOSE BLD GHB EST-SCNC: 5.4 (CALC)
GLUCOSE SERPL-MCNC: 92 MG/DL (ref 65–99)
HBA1C MFR BLD: 5 % OF TOTAL HGB
HDLC SERPL-MCNC: 56 MG/DL
LDLC SERPL CALC-MCNC: 86 MG/DL (CALC)
NONHDLC SERPL-MCNC: 98 MG/DL (CALC)
POTASSIUM SERPL-SCNC: 4.1 MMOL/L (ref 3.5–5.3)
SL AMB EGFR AFRICAN AMERICAN: 68 ML/MIN/1.73M2
SL AMB EGFR NON AFRICAN AMERICAN: 58 ML/MIN/1.73M2
SODIUM SERPL-SCNC: 137 MMOL/L (ref 135–146)
TRIGL SERPL-MCNC: 41 MG/DL

## 2018-05-05 ENCOUNTER — TELEPHONE (OUTPATIENT)
Dept: FAMILY MEDICINE CLINIC | Facility: CLINIC | Age: 53
End: 2018-05-05

## 2018-05-05 NOTE — TELEPHONE ENCOUNTER
----- Message from Jayy Estevez MD sent at 5/4/2018  5:07 PM EDT -----  Please contact patient  I received his blood work  Cholesterol is excellent  Testing for diabetes is excellent  His creatinine is stably elevated at 1 38  I will forward results of his blood test to his nephrologist for review  Paperwork for [employer] filled out    Thank you

## 2018-05-07 ENCOUNTER — OFFICE VISIT (OUTPATIENT)
Dept: FAMILY MEDICINE CLINIC | Facility: CLINIC | Age: 53
End: 2018-05-07
Payer: COMMERCIAL

## 2018-05-07 VITALS
RESPIRATION RATE: 16 BRPM | HEART RATE: 72 BPM | SYSTOLIC BLOOD PRESSURE: 112 MMHG | DIASTOLIC BLOOD PRESSURE: 72 MMHG | BODY MASS INDEX: 32.11 KG/M2 | HEIGHT: 67 IN | WEIGHT: 204.6 LBS | TEMPERATURE: 97.2 F

## 2018-05-07 DIAGNOSIS — I10 ESSENTIAL HYPERTENSION: Primary | ICD-10-CM

## 2018-05-07 DIAGNOSIS — N18.30 CKD (CHRONIC KIDNEY DISEASE) STAGE 3, GFR 30-59 ML/MIN (HCC): ICD-10-CM

## 2018-05-07 PROCEDURE — 3078F DIAST BP <80 MM HG: CPT | Performed by: FAMILY MEDICINE

## 2018-05-07 PROCEDURE — 3074F SYST BP LT 130 MM HG: CPT | Performed by: FAMILY MEDICINE

## 2018-05-07 PROCEDURE — 99213 OFFICE O/P EST LOW 20 MIN: CPT | Performed by: FAMILY MEDICINE

## 2018-05-07 RX ORDER — HYDROCHLOROTHIAZIDE 12.5 MG/1
TABLET ORAL
COMMUNITY
Start: 2018-04-22 | End: 2018-05-28 | Stop reason: SDUPTHER

## 2018-05-07 NOTE — PROGRESS NOTES
FAMILY PRACTICE OFFICE VISIT       NAME: Alexi Hernandez  AGE: 46 y o  SEX: male       : 1965        MRN: [id number]    DATE: 2018  TIME: 8:20 AM    Assessment and Plan     Problem List Items Addressed This Visit     Hypertension - Primary    Relevant Medications    hydrochlorothiazide (HYDRODIURIL) 12 5 mg tablet    Other Relevant Orders    Comprehensive metabolic panel    TSH, 3rd generation    C-reactive protein    Sedimentation rate, automated    CBC and differential    CKD (chronic kidney disease) stage 3, GFR 30-59 ml/min    Relevant Medications    hydrochlorothiazide (HYDRODIURIL) 12 5 mg tablet    Other Relevant Orders    Comprehensive metabolic panel    TSH, 3rd generation    C-reactive protein    Sedimentation rate, automated    CBC and differential       Patient presents for follow-up  Hypertension-blood pressure is well controlled now on regimen of lisinopril 10 mg twice a day, HCTZ 12 5 mg daily and Norvasc 10 mg once a day  Patient keeps Bystolic 5 mg on hand and uses medication on a as needed basis only  His blood pressures have improved  He lost 9 lb of weight  GFR is stably decreased at 57-59 range with creatinine 1 38-1  36  Patient has decreased over-the-counter Turmeric, lost 9 lb of weight and follows lower protein diet  He continues to exercise  He feels generally well, fatigued at times  No chest pain or palpitations being of beta-blocker  He is proceeding with 2nd Nephrology opinion [facility] shortly  Will schedule blood work on follow-up in 4-6 weeks  There are no Patient Instructions on file for this visit  Chief Complaint     Chief Complaint   Patient presents with    Follow-up     Patient is here for a followup         History of Present Illness      Feels well   Patient is under nephrology  Care at [facility] on  HCTZ 12 5 mg poqd   On Lisinopril  10 mg po BID and Norvasc 10 mg po qd   Uses Bystolic 5 mg poqd  PRN only   Decreased protein in diet    patient has decreased Turmeric 800 mg po qd - dose decrease from 2400 mg po qd   No chest pain ; exercises routine;y    joint aches are well controlled  Results of recent blood work discussed          Review of Systems   Review of Systems   Constitutional: Negative  HENT: Negative  Respiratory: Negative  Cardiovascular: Negative  Gastrointestinal: Negative  Musculoskeletal: Negative  Neurological: Negative  Psychiatric/Behavioral: Negative          Active Problem List     Patient Active Problem List   Diagnosis    Hypertension    Myofascial pain syndrome    CKD (chronic kidney disease) stage 3, GFR 30-59 ml/min    Benign prostatic hyperplasia without lower urinary tract symptoms       Past Medical History:  Past Medical History:   Diagnosis Date    Benign prostatic hyperplasia     Herpes simplex     RESOLVED [id number]    Hypertension     Renal disorder     Vitamin D deficiency        Past Surgical History:  Past Surgical History:   Procedure Laterality Date    COLON SURGERY      [facility]       Family History:  Family History   Problem Relation Age of Onset    Other Mother      CABG    Diabetes Mother     Hypertension Mother     Hypertension Father     Kidney cancer Family     Prostate cancer Family     Hypertension Brother        Social History:  Social History     Social History    Marital status: /Civil Union     Spouse name: N/A    Number of children: N/A    Years of education: N/A     Occupational History    /[employer]      Social History Main Topics    Smoking status: Never Smoker    Smokeless tobacco: Never Used    Alcohol use No    Drug use: No    Sexual activity: Not on file     Other Topics Concern    Not on file     Social History Narrative    No narrative on file       Objective     Vitals:    05/07/18 0727 05/07/18 0756   BP: 130/76 112/72   Pulse: 72    Resp: 16    Temp: (!) 97 2 °F (36 2 °C)    TempSrc: Tympanic    Weight: 92 8 kg (204 lb 9 6 oz)    Height: 5' 7" (1 702 m)      Vitals:    05/07/18 0756   BP: 112/72   Pulse:    Resp:    Temp:      Wt Readings from Last 3 Encounters:   05/07/18 92 8 kg (204 lb 9 6 oz)   04/04/18 95 3 kg (210 lb)   03/05/18 96 9 kg (213 lb 9 6 oz)       Physical Exam   Constitutional: He is oriented to person, place, and time  He appears well-developed and well-nourished  HENT:   Head: Normocephalic and atraumatic  Eyes: Conjunctivae are normal    Neck: Neck supple  Carotid bruit is not present  Cardiovascular: Normal rate, regular rhythm and normal heart sounds  No murmur heard  Pulmonary/Chest: Effort normal and breath sounds normal  No respiratory distress  He has no wheezes  He has no rales  Musculoskeletal: Normal range of motion  Neurological: He is alert and oriented to person, place, and time  Psychiatric: He has a normal mood and affect  His behavior is normal    Nursing note and vitals reviewed        Pertinent Laboratory/Diagnostic Studies:  Lab Results   Component Value Date    GLUCOSE 97 01/20/2014    BUN 20 04/28/2018    CREATININE 1 38 (H) 04/28/2018    CALCIUM 9 5 04/28/2018     12/21/2017    K 4 1 12/21/2017    CO2 30 12/21/2017     12/21/2017     Lab Results   Component Value Date    ALT 31 12/21/2017    AST 24 12/21/2017    ALKPHOS 41 12/21/2017    BILITOT 0 8 12/21/2017       Lab Results   Component Value Date    WBC 5 4 12/21/2017    HGB 15 4 12/21/2017    HCT 44 9 12/21/2017    MCV 86 3 12/21/2017     (L) 12/21/2017       No results found for: TSH    Lab Results   Component Value Date    CHOL 158 07/15/2017     Lab Results   Component Value Date    TRIG 41 04/28/2018     Lab Results   Component Value Date    HDL 50 07/15/2017     Lab Results   Component Value Date    LDLCALC 101 01/20/2014     Lab Results   Component Value Date    HGBA1C 5 0 04/28/2018       Results for orders placed or performed in visit on 04/28/18   Lipid panel   Result Value Ref Range    Total Cholesterol 154 <200 mg/dL    SL AMB HDL CHOLESTEROL 56 >40 mg/dL    Triglycerides 41 <150 mg/dL    SL AMB LDL-CHOLESTEROL 86 mg/dL (calc)    SL AMB CHOL/HDLC RATIO 2 8 <5 0 (calc)    SL AMB NON HDL CHOLESTEROL 98 <130 mg/dL (calc)   Basic metabolic panel   Result Value Ref Range    SL AMB GLUCOSE 92 65 - 99 mg/dL    BUN 20 7 - 25 mg/dL    Creatinine, Serum 1 38 (H) 0 70 - 1 33 mg/dL    eGFR Non  58 (L) > OR = 60 mL/min/1 73m2    SL AMB EGFR  68 > OR = 60 mL/min/1 73m2    SL AMB BUN/CREATININE RATIO 14 6 - 22 (calc)    SL AMB SODIUM 137 135 - 146 mmol/L    SL AMB POTASSIUM 4 1 3 5 - 5 3 mmol/L    SL AMB CHLORIDE 101 98 - 110 mmol/L    SL AMB CARBON DIOXIDE 28 20 - 31 mmol/L    SL AMB CALCIUM 9 5 8 6 - 10 3 mg/dL   Hemoglobin A1C With EAG   Result Value Ref Range    Hemoglobin A1C 5 0 <5 7 % of total Hgb    Estimated Average Glucose 97 (calc)    Estimated Average Glucose (mmol/L) 5 4 (calc)   Nicotine metabolite, urine   Result Value Ref Range    Nicotine Screen NONE DETECTED     SL AMB COMMENT(S)         Orders Placed This Encounter   Procedures    Comprehensive metabolic panel    TSH, 3rd generation    C-reactive protein    Sedimentation rate, automated    CBC and differential       ALLERGIES:  Allergies   Allergen Reactions    Penicillins Anaphylaxis    Moxifloxacin Itching and Edema     Category:  Allergy;        Current Medications     Current Outpatient Prescriptions   Medication Sig Dispense Refill    amLODIPine (NORVASC) 10 mg tablet Take 1 tablet (10 mg total) by mouth daily 90 tablet 0    BYSTOLIC 5 MG tablet TAKE ONE TABLET BY MOUTH EVERY DAY 90 tablet 0    Cholecalciferol (VITAMIN D3) 2000 units TABS Take 1 tablet by mouth daily        hydrochlorothiazide (HYDRODIURIL) 12 5 mg tablet       lisinopril (ZESTRIL) 10 mg tablet Take 1 tablet (10 mg total) by mouth 2 (two) times a day 180 tablet 3    Misc Natural Products (TURMERIC CURCUMIN) CAPS Take 1 capsule by mouth 2 (two) times a day 2400mg twice daily       sildenafil (VIAGRA) 100 mg tablet TAKE ONE TABLET BY MOUTH AS NEEDED ON AN EMPTY STOMACH  12     No current facility-administered medications for this visit            Health Maintenance     Health Maintenance   Topic Date Due    HIV SCREENING  1965    COLONOSCOPY  1965    Depression Screening PHQ-9  12/07/1977    DTaP,Tdap,and Td Vaccines (1 - Tdap) 12/07/1986    INFLUENZA VACCINE  09/01/2018    Hepatitis C Screening  Completed     Immunization History   Administered Date(s) Administered    Influenza Quadrivalent Preservative Free 3 years and older IM 10/20/2017    Influenza TIV (IM) 08/20/2016       Jo Pritchett MD

## 2018-05-08 ENCOUNTER — TELEPHONE (OUTPATIENT)
Dept: FAMILY MEDICINE CLINIC | Facility: CLINIC | Age: 53
End: 2018-05-08

## 2018-05-08 NOTE — TELEPHONE ENCOUNTER
----- Message from Ivon Marquez MD sent at 5/7/2018  8:18 AM EDT -----  Regarding: records request  Fax  Last 23 OV notes of mine and last 2 nephrology notes and last 2-3 sets of labs and 2015 renal US  [facility]   [facility]   [place] [facility]   [phone] fax    thanks

## 2018-05-28 DIAGNOSIS — I10 ESSENTIAL HYPERTENSION: Primary | ICD-10-CM

## 2018-05-28 RX ORDER — HYDROCHLOROTHIAZIDE 12.5 MG/1
TABLET ORAL
Qty: 30 TABLET | Refills: 2 | Status: SHIPPED | OUTPATIENT
Start: 2018-05-28 | End: 2018-06-02 | Stop reason: SDUPTHER

## 2018-06-02 DIAGNOSIS — I10 ESSENTIAL HYPERTENSION: ICD-10-CM

## 2018-06-02 RX ORDER — HYDROCHLOROTHIAZIDE 12.5 MG/1
TABLET ORAL
Qty: 30 TABLET | Refills: 2 | Status: SHIPPED | OUTPATIENT
Start: 2018-06-02 | End: 2019-01-19

## 2018-06-22 DIAGNOSIS — I10 ESSENTIAL HYPERTENSION: ICD-10-CM

## 2018-06-22 RX ORDER — AMLODIPINE BESYLATE 10 MG/1
10 TABLET ORAL DAILY
Qty: 90 TABLET | Refills: 0 | Status: SHIPPED | OUTPATIENT
Start: 2018-06-22 | End: 2018-08-01 | Stop reason: SDUPTHER

## 2018-06-24 DIAGNOSIS — I10 ESSENTIAL HYPERTENSION: ICD-10-CM

## 2018-06-24 RX ORDER — NEBIVOLOL HYDROCHLORIDE 10 MG/1
10 TABLET ORAL DAILY
Qty: 90 TABLET | Refills: 0 | OUTPATIENT
Start: 2018-06-24

## 2018-06-27 DIAGNOSIS — I10 ESSENTIAL HYPERTENSION: ICD-10-CM

## 2018-06-28 RX ORDER — AMLODIPINE BESYLATE 10 MG/1
10 TABLET ORAL DAILY
Qty: 90 TABLET | Refills: 0 | Status: SHIPPED | OUTPATIENT
Start: 2018-06-28 | End: 2018-10-01 | Stop reason: SDUPTHER

## 2018-07-27 RX ORDER — LISINOPRIL 2.5 MG/1
TABLET ORAL
COMMUNITY
Start: 2018-05-02 | End: 2018-08-01 | Stop reason: DRUGHIGH

## 2018-08-01 ENCOUNTER — OFFICE VISIT (OUTPATIENT)
Dept: UROLOGY | Facility: MEDICAL CENTER | Age: 53
End: 2018-08-01
Payer: COMMERCIAL

## 2018-08-01 VITALS
DIASTOLIC BLOOD PRESSURE: 84 MMHG | WEIGHT: 205 LBS | SYSTOLIC BLOOD PRESSURE: 134 MMHG | BODY MASS INDEX: 32.18 KG/M2 | HEIGHT: 67 IN

## 2018-08-01 DIAGNOSIS — R97.20 ELEVATED PSA: ICD-10-CM

## 2018-08-01 DIAGNOSIS — N13.8 BPH WITH OBSTRUCTION/LOWER URINARY TRACT SYMPTOMS: Primary | ICD-10-CM

## 2018-08-01 DIAGNOSIS — N28.9 RENAL INSUFFICIENCY: ICD-10-CM

## 2018-08-01 DIAGNOSIS — N40.1 BPH WITH OBSTRUCTION/LOWER URINARY TRACT SYMPTOMS: Primary | ICD-10-CM

## 2018-08-01 LAB
SL AMB  POCT GLUCOSE, UA: NORMAL
SL AMB LEUKOCYTE ESTERASE,UA: NORMAL
SL AMB POCT BILIRUBIN,UA: NORMAL
SL AMB POCT BLOOD,UA: NORMAL
SL AMB POCT CLARITY,UA: CLEAR
SL AMB POCT COLOR,UA: YELLOW
SL AMB POCT KETONES,UA: NORMAL
SL AMB POCT NITRITE,UA: NORMAL
SL AMB POCT PH,UA: 5.5
SL AMB POCT SPECIFIC GRAVITY,UA: 1.01
SL AMB POCT URINE PROTEIN: NORMAL
SL AMB POCT UROBILINOGEN: 0.2

## 2018-08-01 PROCEDURE — 99214 OFFICE O/P EST MOD 30 MIN: CPT | Performed by: UROLOGY

## 2018-08-01 PROCEDURE — 81003 URINALYSIS AUTO W/O SCOPE: CPT | Performed by: UROLOGY

## 2018-08-01 NOTE — LETTER
August 1, 2018     Jayy Estevez MD  [address] [facility] [address]    Patient: Mark Haskins   YOB: 1965   Date of Visit: 8/1/2018       Dear Dr Derek Jefferson: Thank you for referring Subha Niño to me for evaluation  Below are my notes for this consultation  If you have questions, please do not hesitate to call me  I look forward to following your patient along with you  Sincerely,        Tramaine Lopez MD        CC: No Recipients  Tramaine Lopez MD  8/1/2018  4:45 PM  Sign at close encounter  Assessment/Plan:  1  PSA stable in the high four range, certainly from the BPH  2   He still sees Urology at [facility] yearly, we will stretch our visits out to once every two years  No problem-specific Assessment & Plan notes found for this encounter  Diagnoses and all orders for this visit:    BPH with obstruction/lower urinary tract symptoms  -     POCT urine dip auto non-scope    Elevated PSA    Renal insufficiency    Other orders  -     Discontinue: lisinopril (ZESTRIL) 2 5 mg tablet;           Subjective:      Patient ID: Mark Haskins is a 46 y o  male  1   Follow-up for patient with mildly elevated PSA, most recently 4 8 done at [facility]  Biopsy 60 g benign with us in July 2017     2   Mild BPH, tolerates change in flow quite well  3   Mild renal insufficiency, second opinion at [facility] see their notes  The following portions of the patient's history were reviewed and updated as appropriate: allergies, current medications, past family history, past medical history, past social history, past surgical history and problem list     Review of Systems   All other systems reviewed and are negative  Objective:      /84   Ht 5' 7" (1 702 m)   Wt 93 kg (205 lb)   BMI 32 11 kg/m²           Physical Exam   Constitutional: He is oriented to person, place, and time  He appears well-developed and well-nourished  No distress     HENT:   Head: Normocephalic and atraumatic  Eyes: Conjunctivae are normal    Cardiovascular: Normal rate and regular rhythm  Pulmonary/Chest: Effort normal and breath sounds normal  No respiratory distress  He has no wheezes  Abdominal: Soft  Bowel sounds are normal  He exhibits no distension and no mass  There is no tenderness  Genitourinary:   Genitourinary Comments: Penis testes no lesions    Prostate moderately enlarged no nodules   Neurological: He is alert and oriented to person, place, and time  Skin: Skin is warm and dry  He is not diaphoretic

## 2018-08-01 NOTE — PROGRESS NOTES
Assessment/Plan:  1  PSA stable in the high four range, certainly from the BPH  2   He still sees Urology at [facility] yearly, we will stretch our visits out to once every two years  No problem-specific Assessment & Plan notes found for this encounter  Diagnoses and all orders for this visit:    BPH with obstruction/lower urinary tract symptoms  -     POCT urine dip auto non-scope    Elevated PSA    Renal insufficiency    Other orders  -     Discontinue: lisinopril (ZESTRIL) 2 5 mg tablet;           Subjective:      Patient ID: Arron Donahue is a 46 y o  male  1   Follow-up for patient with mildly elevated PSA, most recently 4 8 done at [facility]  Biopsy 60 g benign with us in July 2017     2   Mild BPH, tolerates change in flow quite well  3   Mild renal insufficiency, second opinion at [facility] see their notes  The following portions of the patient's history were reviewed and updated as appropriate: allergies, current medications, past family history, past medical history, past social history, past surgical history and problem list     Review of Systems   All other systems reviewed and are negative  Objective:      /84   Ht 5' 7" (1 702 m)   Wt 93 kg (205 lb)   BMI 32 11 kg/m²          Physical Exam   Constitutional: He is oriented to person, place, and time  He appears well-developed and well-nourished  No distress  HENT:   Head: Normocephalic and atraumatic  Eyes: Conjunctivae are normal    Cardiovascular: Normal rate and regular rhythm  Pulmonary/Chest: Effort normal and breath sounds normal  No respiratory distress  He has no wheezes  Abdominal: Soft  Bowel sounds are normal  He exhibits no distension and no mass  There is no tenderness  Genitourinary:   Genitourinary Comments: Penis testes no lesions    Prostate moderately enlarged no nodules   Neurological: He is alert and oriented to person, place, and time  Skin: Skin is warm and dry   He is not diaphoretic

## 2018-09-18 DIAGNOSIS — I10 ESSENTIAL HYPERTENSION: ICD-10-CM

## 2018-09-18 RX ORDER — HYDROCHLOROTHIAZIDE 12.5 MG/1
TABLET ORAL
Qty: 90 TABLET | Refills: 0 | OUTPATIENT
Start: 2018-09-18

## 2018-10-01 DIAGNOSIS — I10 ESSENTIAL HYPERTENSION: ICD-10-CM

## 2018-10-01 RX ORDER — AMLODIPINE BESYLATE 10 MG/1
10 TABLET ORAL DAILY
Qty: 90 TABLET | Refills: 1 | Status: SHIPPED | OUTPATIENT
Start: 2018-10-01 | End: 2019-03-18 | Stop reason: SDUPTHER

## 2019-01-18 RX ORDER — VALSARTAN 80 MG/1
TABLET ORAL
Refills: 1 | COMMUNITY
Start: 2018-12-27 | End: 2019-01-29

## 2019-01-19 ENCOUNTER — OFFICE VISIT (OUTPATIENT)
Dept: FAMILY MEDICINE CLINIC | Facility: CLINIC | Age: 54
End: 2019-01-19
Payer: COMMERCIAL

## 2019-01-19 VITALS
HEIGHT: 67 IN | RESPIRATION RATE: 16 BRPM | BODY MASS INDEX: 33.15 KG/M2 | WEIGHT: 211.2 LBS | SYSTOLIC BLOOD PRESSURE: 140 MMHG | TEMPERATURE: 97 F | DIASTOLIC BLOOD PRESSURE: 90 MMHG | HEART RATE: 64 BPM

## 2019-01-19 DIAGNOSIS — L50.9 URTICARIA: ICD-10-CM

## 2019-01-19 DIAGNOSIS — I10 ESSENTIAL HYPERTENSION: ICD-10-CM

## 2019-01-19 DIAGNOSIS — Z28.21 REFUSED INFLUENZA VACCINE: Primary | ICD-10-CM

## 2019-01-19 PROBLEM — M75.41 IMPINGEMENT SYNDROME OF RIGHT SHOULDER: Status: ACTIVE | Noted: 2017-10-17

## 2019-01-19 PROBLEM — N40.0 BENIGN PROSTATIC HYPERPLASIA: Status: ACTIVE | Noted: 2018-08-01

## 2019-01-19 PROCEDURE — 99213 OFFICE O/P EST LOW 20 MIN: CPT | Performed by: FAMILY MEDICINE

## 2019-01-19 RX ORDER — METHYLPREDNISOLONE 4 MG/1
TABLET ORAL
Refills: 0 | Status: CANCELLED | OUTPATIENT
Start: 2019-01-19

## 2019-01-19 RX ORDER — METHYLPREDNISOLONE 4 MG/1
TABLET ORAL
Qty: 21 TABLET | Refills: 0 | Status: SHIPPED | OUTPATIENT
Start: 2019-01-19 | End: 2019-01-29

## 2019-01-19 NOTE — ASSESSMENT & PLAN NOTE
Hypertension  Patient will continue to monitor his blood pressure    He will be getting contact with his nephrologist in [place] who will decide on best treatment options for his blood pressure pending readings

## 2019-01-19 NOTE — ASSESSMENT & PLAN NOTE
Urticaria  Patient with hive type of lesion  He was given a Medrol Dosepak to take as directed for 6 days  He had discontinued his valsartan medication 2 days ago  He is also using over-the-counter Cetaphil cream which does help with pruritus for approximately 4 hours upon application

## 2019-01-19 NOTE — PROGRESS NOTES
FAMILY PRACTICE OFFICE VISIT       NAME: Aditya Macias  AGE: 48 y o  SEX: male       : 1965        MRN: [id number]    DATE: 2019  TIME: 8:54 AM    Assessment and Plan     Problem List Items Addressed This Visit     Essential hypertension     Hypertension  Patient will continue to monitor his blood pressure  He will be getting contact with his nephrologist in [place] who will decide on best treatment options for his blood pressure pending readings         Relevant Medications    valsartan (DIOVAN) 80 mg tablet    Urticaria     Urticaria  Patient with hive type of lesion  He was given a Medrol Dosepak to take as directed for 6 days  He had discontinued his valsartan medication 2 days ago  He is also using over-the-counter Cetaphil cream which does help with pruritus for approximately 4 hours upon application  Relevant Medications    methylPREDNISolone 4 MG tablet therapy pack      Other Visit Diagnoses     Refused influenza vaccine    -  Primary            There are no Patient Instructions on file for this visit  Chief Complaint     Chief Complaint   Patient presents with    Urticaria     Pt is here for hives possible eczema? Allergic reaction       History of Present Illness     Patient is seen by a nephrologist at [facility] for blood pressure control and kidney function monitoring  States he developed a pruritic rash well on lisinopril approximately 1 month ago and medication was discontinued  He was switched to valsartan 80 mg daily  In past years he had been on brand name Diovan HCT without any problems  He does take his usual amlodipine dosing  He denies any changes with over-the-counter supplements or vitamins etc        Review of Systems   Review of Systems   Constitutional: Negative  HENT: Negative  Respiratory: Negative  Cardiovascular: Negative  Gastrointestinal: Negative  Musculoskeletal: Negative      Skin:        As per HPI Active Problem List     Patient Active Problem List   Diagnosis    Essential hypertension    Myofascial pain syndrome    CKD (chronic kidney disease) stage 3, GFR 30-59 ml/min (HCC)    Benign prostatic hyperplasia without lower urinary tract symptoms    Benign prostatic hyperplasia    Elevated PSA    Renal insufficiency    Angiomyolipoma of kidney    Erectile dysfunction    Impingement syndrome of right shoulder    Thrombocytopenia (HCC)    Urticaria       Past Medical History:  Past Medical History:   Diagnosis Date    Benign prostatic hyperplasia     Herpes simplex     RESOLVED [id number]    Hypertension     Renal disorder     Vitamin D deficiency        Past Surgical History:  Past Surgical History:   Procedure Laterality Date    COLON SURGERY      [facility]  12/23/2015     [facility]    PROSTATE BIOPSY         Family History:  Family History   Problem Relation Age of Onset    Other Mother         CABG    Diabetes Mother     Hypertension Mother     Hypertension Father     Kidney cancer Family     Prostate cancer Family     Hypertension Brother        Social History:  Social History     Social History    Marital status: /Civil Union     Spouse name: N/A    Number of children: N/A    Years of education: N/A     Occupational History    /[employer]      Social History Main Topics    Smoking status: Never Smoker    Smokeless tobacco: Never Used    Alcohol use No    Drug use: No    Sexual activity: Not on file     Other Topics Concern    Not on file     Social History Narrative    No narrative on file       Objective     Vitals:    01/19/19 0810   BP: 140/90   Pulse: 64   Resp: 16   Temp: (!) 97 °F (36 1 °C)     Wt Readings from Last 3 Encounters:   01/19/19 95 8 kg (211 lb 3 2 oz)   08/01/18 93 kg (205 lb)   05/07/18 92 8 kg (204 lb 9 6 oz)       Physical Exam   Constitutional: No distress     Skin:   Patient with a large patch of red pruritic papular skin below left breast area approximately 3 x 6 inches  He has similar red pruritic papules along left upper thigh area with marks of excoriation with patient was scratching  Linear papules appear to follow all direction of scratching  Pertinent Laboratory/Diagnostic Studies:  Lab Results   Component Value Date    GLUCOSE 97 01/20/2014    BUN 20 04/28/2018    BUN 20 04/28/2018    CREATININE 1 39 (H) 12/21/2017    CALCIUM 9 5 04/28/2018    CALCIUM 9 7 04/28/2018     12/21/2017    K 4 1 04/28/2018    K 4 1 04/28/2018    CO2 28 04/28/2018    CO2 28 04/28/2018     04/28/2018     04/28/2018     Lab Results   Component Value Date    ALT 31 12/21/2017    AST 24 12/21/2017    ALKPHOS 41 12/21/2017    BILITOT 0 8 12/21/2017       Lab Results   Component Value Date    WBC 5 4 12/21/2017    HGB 15 4 12/21/2017    HCT 44 9 12/21/2017    MCV 86 3 12/21/2017     (L) 12/21/2017       No results found for: TSH    Lab Results   Component Value Date    CHOL 158 07/15/2017     Lab Results   Component Value Date    TRIG 41 04/28/2018     Lab Results   Component Value Date    HDL 56 04/28/2018     Lab Results   Component Value Date    LDLCALC 101 01/20/2014     Lab Results   Component Value Date    HGBA1C 5 0 04/28/2018       Results for orders placed or performed in visit on 08/01/18   POCT urine dip auto non-scope   Result Value Ref Range     COLOR,UA yellow     CLARITY,UA clear     SPECIFIC GRAVITY,UA 1 015      PH,UA 5 5     LEUKOCYTE ESTERASE,UA neg     NITRITE,UA neg     GLUCOSE, UA neg     KETONES,UA neg     BILIRUBIN,UA neg     BLOOD,UA neg     POCT URINE PROTEIN neg     SL AMB POCT UROBILINOGEN 0 2        No orders of the defined types were placed in this encounter  ALLERGIES:  Allergies   Allergen Reactions    Penicillins Anaphylaxis    Moxifloxacin Itching and Edema     Category:  Allergy;     Lisinopril Rash       Current Medications     Current Outpatient Prescriptions   Medication Sig Dispense Refill    amLODIPine (NORVASC) 10 mg tablet TAKE 1 TABLET (10 MG TOTAL) BY MOUTH DAILY 90 tablet 1    Cholecalciferol (VITAMIN D3) 2000 units TABS Take 1 tablet by mouth daily        Misc Natural Products (TURMERIC CURCUMIN) CAPS Take 1 capsule by mouth 2 (two) times a day 2400mg twice daily       valsartan (DIOVAN) 80 mg tablet TAKE 1 TABLET (80 MG PER DOSE) BY MOUTH DAILY  1    methylPREDNISolone 4 MG tablet therapy pack Use as directed on package 21 tablet 0     No current facility-administered medications for this visit            Health Maintenance     Health Maintenance   Topic Date Due    DTaP,Tdap,and Td Vaccines (1 - Tdap) 12/07/1986    INFLUENZA VACCINE  07/01/2018    Depression Screening PHQ  01/19/2020    CRC Screening: Colonoscopy  12/23/2020    Hepatitis C Screening  Completed     Immunization History   Administered Date(s) Administered    Influenza Quadrivalent Preservative Free 3 years and older IM 10/20/2017    Influenza TIV (IM) 62/71/7205       Jessica Samson MD

## 2019-01-29 ENCOUNTER — OFFICE VISIT (OUTPATIENT)
Dept: FAMILY MEDICINE CLINIC | Facility: CLINIC | Age: 54
End: 2019-01-29
Payer: COMMERCIAL

## 2019-01-29 VITALS
BODY MASS INDEX: 32.93 KG/M2 | SYSTOLIC BLOOD PRESSURE: 144 MMHG | TEMPERATURE: 98.6 F | WEIGHT: 209.8 LBS | HEART RATE: 76 BPM | DIASTOLIC BLOOD PRESSURE: 84 MMHG | HEIGHT: 67 IN | RESPIRATION RATE: 16 BRPM

## 2019-01-29 DIAGNOSIS — I10 ESSENTIAL HYPERTENSION: ICD-10-CM

## 2019-01-29 DIAGNOSIS — L50.9 URTICARIA: Primary | ICD-10-CM

## 2019-01-29 DIAGNOSIS — R09.89 SYMPTOMS OF UPPER RESPIRATORY INFECTION (URI): ICD-10-CM

## 2019-01-29 DIAGNOSIS — N18.30 CKD (CHRONIC KIDNEY DISEASE) STAGE 3, GFR 30-59 ML/MIN (HCC): ICD-10-CM

## 2019-01-29 DIAGNOSIS — Z28.21 VACCINATION REFUSED BY PATIENT: ICD-10-CM

## 2019-01-29 PROCEDURE — 99214 OFFICE O/P EST MOD 30 MIN: CPT | Performed by: FAMILY MEDICINE

## 2019-01-29 RX ORDER — DOXYCYCLINE 100 MG/1
100 CAPSULE ORAL 2 TIMES DAILY
Qty: 14 CAPSULE | Refills: 0 | Status: SHIPPED | OUTPATIENT
Start: 2019-01-29 | End: 2019-01-29 | Stop reason: SDUPTHER

## 2019-01-29 RX ORDER — OSELTAMIVIR PHOSPHATE 75 MG/1
75 CAPSULE ORAL 2 TIMES DAILY
Qty: 10 CAPSULE | Refills: 0 | Status: SHIPPED | OUTPATIENT
Start: 2019-01-29 | End: 2019-02-03

## 2019-01-29 RX ORDER — HYDROCHLOROTHIAZIDE 12.5 MG/1
1 TABLET ORAL DAILY
Refills: 1 | COMMUNITY
Start: 2019-01-19 | End: 2019-01-29 | Stop reason: ALTCHOICE

## 2019-01-29 RX ORDER — DOXYCYCLINE 100 MG/1
100 CAPSULE ORAL 2 TIMES DAILY
Qty: 14 CAPSULE | Refills: 0 | Status: SHIPPED | OUTPATIENT
Start: 2019-01-29 | End: 2019-02-05

## 2019-01-29 RX ORDER — DOXAZOSIN MESYLATE 1 MG/1
1 TABLET ORAL
Qty: 30 TABLET | Refills: 1 | Status: SHIPPED | OUTPATIENT
Start: 2019-01-29 | End: 2019-02-28

## 2019-01-29 RX ORDER — OSELTAMIVIR PHOSPHATE 75 MG/1
75 CAPSULE ORAL 2 TIMES DAILY
Qty: 10 CAPSULE | Refills: 0 | Status: SHIPPED | OUTPATIENT
Start: 2019-01-29 | End: 2019-01-29 | Stop reason: SDUPTHER

## 2019-01-29 NOTE — PROGRESS NOTES
FAMILY PRACTICE OFFICE VISIT       NAME: Suzette Terrell  AGE: 48 y o  SEX: male       : 1965        MRN: [id number]        Assessment and Plan     Problem List Items Addressed This Visit     Essential hypertension    Relevant Medications    doxazosin (CARDURA) 1 mg tablet    Urticaria - Primary    Relevant Orders    Ambulatory referral to Dermatology      Other Visit Diagnoses     Symptoms of upper respiratory infection (URI)        Relevant Medications    oseltamivir (TAMIFLU) 75 mg capsule    doxycycline monohydrate (MONODOX) 100 mg capsule    Vaccination refused by patient        Relevant Orders    PREFERRED: influenza vaccine, [id number], quadrivalent, recombinant, PF, 0 5 mL, for patients 18 yr+ (FLUBLOK)       Patient presents for re-evaluation of recurrent pruritic rash consistent with urticaria  Reportedly his symptoms started a months ago after dose of lisinopril was increased from 10-20 mg by Nephrology at [facility]  Subsequently, patient has discontinued lisinopril and restarted Diovan that he has used for many years with no adverse side effects  Unfortunately, his pruritic rash is re-occurring  He responded favorably to 6 day prednisone treatment but unfortunately his symptoms are reoccurring  Patient denies difficulty breathing or swallowing  No other symptoms  Blood pressure control is suboptimal at present time on amlodipine 10 mg once a day and HCTZ 12 5 mg daily  Patient denies any new medications or personal care products that could have triggered his symptoms  Plan:  Discontinue HCTZ will try low-dose of Cardura 1 mg at bedtime, patient will start monitoring blood pressures at home and will contact me with any lower blood pressures or symptoms of orthostatic lightheadedness  Patient will continue amlodipine 10 mg once a day right now, I informed him that there is a remote possibility that his allergic reaction could be triggered to chronic use of amlodipine as well    Will request Dermatology consult and skin biopsy for proper diagnosis  Patient will use over-the-counter antihistamine but will have to hold them at least 1 week prior to his appointment with Allergy/immunology  Upper respiratory symptoms:  Patient with history of recurrent sinusitis  Will start him on doxycycline 100 mg twice a day for 7 days  I am prescribing Tamiflu for 5 days, patient is quite concerned since his wife was recently diagnosed I would like to have this medication on hand  He currently does not exhibit any signs or symptoms of influenza, appears afebrile, and will be treated for sinus infection  There are no Patient Instructions on file for this visit  [employer] software was used to dictate this note  It may contain errors with dictating incorrect words/spelling  Please contact provider directly with any questions  Chief Complaint     Chief Complaint   Patient presents with    Allergic Reaction     Pt states that he has itchiness and hives     Blood Pressure Check     Pt states that his blood pressure has been fluctating     Headache    Cough    Nasal Congestion       History of Present Illness     Follow up  HTN   patient has been f/up with  Nephrology  at [facility]    Patient has been using Norvasc 10 mg once a day along with  lisinopril 10 mg qd-  Then dose was increased to 20 mg daily - 12/2018- as nephrology   Patient and developed hives after dose increase and Lisinopril  Was d/marleen  -  switched  Back to Diovan 80 mg daily -  Increased to  120 mg daily - patient reportedly redeveloped  allergic response /pruritic erythematous rash that is usually occurring in the chest and torso area  No rash face or limbs  Today, patient is complaining of very pruritic rash left lower chest and left upper posterior thigh  Patient was seen in our office by Dr Eugenio Lynne few weeks ago    He was treated with prednisone that worked well, rash disappeared, patient reports recent recurrence of symptoms  Patient has discontinued both lisinopril and Diovan and remains on Norvasc 10 mg once a day and HCTZ 12 5 mg daily  Patient did use brand name Diovan and then generic valsartan for many years in the past without any side effects  Patient denies difficulty swallowing or breathing  Patient states that blood pressures at home are suboptimally controlled, in 140s over mid 80s  He has been experiencing chronic intermittent tension-type occipital headaches  Patient is also experiencing symptoms of cold, postnasal drip, mild sinus congestion, cough, denies chest tightness or wheezing  No fever  His wife was recently diagnosed with influenza and pt is requesting prescription for Tamiflu that he will simply keep on hand  Unfortunately, patient's rash is reoccurring while of ACE  or ARB medication  He has pending follow-up with allergist  - Sarah Thorpe      Patient states that most recent blood work was performed at [facility] revealing creatinine of 1 47 and GFR of 54  CBC was normal, platelets were clumped  Liver function tests were within normal limits  Allergic Reaction   Associated symptoms include coughing and a rash  Headache    Associated symptoms include coughing and a sore throat  Cough   Associated symptoms include headaches ( chronic tension headaches), postnasal drip, a rash and a sore throat  Review of Systems   Review of Systems   Constitutional: Negative  HENT: Positive for congestion, postnasal drip and sore throat  Eyes: Negative  Respiratory: Positive for cough  Cardiovascular: Negative  Gastrointestinal: Negative  Endocrine: Negative  Musculoskeletal: Negative  Skin: Positive for rash  Neurological: Positive for headaches ( chronic tension headaches)  Hematological: Negative  Psychiatric/Behavioral: Negative          Active Problem List     Patient Active Problem List   Diagnosis    Essential hypertension    Myofascial pain syndrome    CKD (chronic kidney disease) stage 3, GFR 30-59 ml/min (HCC)    Benign prostatic hyperplasia without lower urinary tract symptoms    Benign prostatic hyperplasia    Elevated PSA    Renal insufficiency    Angiomyolipoma of kidney    Erectile dysfunction    Impingement syndrome of right shoulder    Thrombocytopenia (HCC)    Urticaria       Past Medical History:  Past Medical History:   Diagnosis Date    Benign prostatic hyperplasia     Herpes simplex     RESOLVED [id number]    Hypertension     Renal disorder     Vitamin D deficiency        Past Surgical History:  Past Surgical History:   Procedure Laterality Date    COLON SURGERY      [facility]    PROSTATE BIOPSY         Family History:  Family History   Problem Relation Age of Onset    Other Mother         CABG    Diabetes Mother     Hypertension Mother     Hypertension Father     Kidney cancer Family     Prostate cancer Family     Hypertension Brother        Social History:  Social History     Social History    Marital status: /Civil Union     Spouse name: N/A    Number of children: N/A    Years of education: N/A     Occupational History    /[employer]      Social History Main Topics    Smoking status: Never Smoker    Smokeless tobacco: Never Used    Alcohol use No    Drug use: No    Sexual activity: Not on file     Other Topics Concern    Not on file     Social History Narrative    No narrative on file       Objective     Vitals:    01/29/19 0909 01/29/19 1000   BP: 130/80 144/84   BP Location: Left arm    Patient Position: Sitting    Cuff Size: Large    Pulse: 76    Resp: 16    Temp: 98 6 °F (37 °C)    TempSrc: Tympanic    Weight: 95 2 kg (209 lb 12 8 oz)    Height: 5' 7" (1 702 m)      Wt Readings from Last 3 Encounters:   01/29/19 95 2 kg (209 lb 12 8 oz)   01/19/19 95 8 kg (211 lb 3 2 oz)   08/01/18 93 kg (205 lb)       Physical Exam   Constitutional: He is oriented to person, place, and time  He appears well-developed and well-nourished  HENT:   Head: Normocephalic and atraumatic  Mouth/Throat: No oropharyngeal exudate  Erythema of oropharynx, postnasal drip, no exudates  Tympanic membranes are clear, no erythema   Eyes: Conjunctivae are normal    Neck: Neck supple  Carotid bruit is not present  Cardiovascular: Normal rate, regular rhythm and normal heart sounds  No murmur heard  Pulmonary/Chest: Effort normal and breath sounds normal  No respiratory distress  He has no wheezes  He has no rales  Abdominal: Soft  Bowel sounds are normal  He exhibits no abdominal bruit  Musculoskeletal: Normal range of motion  He exhibits no edema  Lymphadenopathy:     He has no cervical adenopathy  Neurological: He is alert and oriented to person, place, and time  No cranial nerve deficit  Skin:   Pruritic rash left anterior chest  Urticaria type erythematous pruritic rash left upper posterior thigh   Psychiatric: He has a normal mood and affect  His behavior is normal    Nursing note and vitals reviewed        Pertinent Laboratory/Diagnostic Studies:  Lab Results   Component Value Date    GLUCOSE 97 01/20/2014    BUN 20 04/28/2018    BUN 20 04/28/2018    CREATININE 1 39 (H) 12/21/2017    CALCIUM 9 5 04/28/2018    CALCIUM 9 7 04/28/2018     12/21/2017    K 4 1 04/28/2018    K 4 1 04/28/2018    CO2 28 04/28/2018    CO2 28 04/28/2018     04/28/2018     04/28/2018     Lab Results   Component Value Date    ALT 31 12/21/2017    AST 24 12/21/2017    ALKPHOS 41 12/21/2017    BILITOT 0 8 12/21/2017       Lab Results   Component Value Date    WBC 5 4 12/21/2017    HGB 15 4 12/21/2017    HCT 44 9 12/21/2017    MCV 86 3 12/21/2017     (L) 12/21/2017       No results found for: TSH    Lab Results   Component Value Date    CHOL 158 07/15/2017     Lab Results   Component Value Date    TRIG 41 04/28/2018     Lab Results   Component Value Date    HDL 56 04/28/2018     Lab Results   Component Value Date    LDLCALC 101 01/20/2014     Lab Results   Component Value Date    HGBA1C 5 0 04/28/2018       Results for orders placed or performed in visit on 08/01/18   POCT urine dip auto non-scope   Result Value Ref Range     COLOR,UA yellow     CLARITY,UA clear     SPECIFIC GRAVITY,UA 1 015      PH,UA 5 5     LEUKOCYTE ESTERASE,UA neg     NITRITE,UA neg     GLUCOSE, UA neg     KETONES,UA neg     BILIRUBIN,UA neg     BLOOD,UA neg     POCT URINE PROTEIN neg     SL AMB POCT UROBILINOGEN 0 2        Orders Placed This Encounter   Procedures    PREFERRED: influenza vaccine, [id number], quadrivalent, recombinant, PF, 0 5 mL, for patients 18 yr+ (FLUBLOK)    Ambulatory referral to Dermatology       ALLERGIES:  Allergies   Allergen Reactions    Penicillins Anaphylaxis    Moxifloxacin Itching and Edema     Category: Allergy;     Lisinopril Rash    Valsartan Rash       Current Medications     Current Outpatient Prescriptions   Medication Sig Dispense Refill    amLODIPine (NORVASC) 10 mg tablet TAKE 1 TABLET (10 MG TOTAL) BY MOUTH DAILY 90 tablet 1    Cholecalciferol (VITAMIN D3) 2000 units TABS Take 1 tablet by mouth daily        Misc Natural Products (TURMERIC CURCUMIN) CAPS Take 1 capsule by mouth 2 (two) times a day 2400mg twice daily       doxazosin (CARDURA) 1 mg tablet Take 1 tablet (1 mg total) by mouth daily at bedtime 30 tablet 1    doxycycline monohydrate (MONODOX) 100 mg capsule Take 1 capsule (100 mg total) by mouth 2 (two) times a day for 7 days 14 capsule 0    oseltamivir (TAMIFLU) 75 mg capsule Take 1 capsule (75 mg total) by mouth 2 (two) times a day for 5 days 10 capsule 0     No current facility-administered medications for this visit            Health Maintenance     Health Maintenance   Topic Date Due    DTaP,Tdap,and Td Vaccines (1 - Tdap) 12/07/1986    INFLUENZA VACCINE  02/28/2019 (Originally 7/1/2018)    Depression Screening PHQ  01/19/2020    CRC Screening: Colonoscopy  12/23/2020    Hepatitis C Screening  Completed     Immunization History   Administered Date(s) Administered    Influenza Quadrivalent Preservative Free 3 years and older IM 10/20/2017    Influenza TIV (IM) 08/20/2016       Shawn Smith MD

## 2019-02-01 ENCOUNTER — TELEPHONE (OUTPATIENT)
Dept: FAMILY MEDICINE CLINIC | Facility: CLINIC | Age: 54
End: 2019-02-01

## 2019-02-01 NOTE — TELEPHONE ENCOUNTER
Contacted [facility], scheduled an appointment for the patient for February 8th @ 8:50am in the [facility] office with Elva KELLEY  Left patient a detailed message with the afore-mentioned instructions   FYI if the patient cancels this appointment the next available will be sometime in August

## 2019-02-01 NOTE — TELEPHONE ENCOUNTER
Please contactone of Dermatology groups in area  I am looking for ASAP appointment for this patient who has been experiencing recurrent urticaria rash, possible medication related eruption  I would appreciate if he could be seen ; patient may require skin biopsy    Thank you

## 2019-02-06 ENCOUNTER — TELEPHONE (OUTPATIENT)
Dept: FAMILY MEDICINE CLINIC | Facility: CLINIC | Age: 54
End: 2019-02-06

## 2019-02-06 NOTE — TELEPHONE ENCOUNTER
I would recommend to continue on doxycycline and HCTZ, I do not believe there is any significant interaction between those medications    Levaquin can cause worsening of kidney function  and ligament/muscle tears  Thanks

## 2019-02-06 NOTE — TELEPHONE ENCOUNTER
Spoke with pt, his Nephrologist does not want the HCTZ stopped  Pt is NOT taking Cardura and continues on HCTZ per Nephrologist recommendation  His concern is the Doxycyline has a LOW level of interaction with the HCTZ and he is doing well with taking this med with no side effects and feeling better  But Levaquin has even less of an interaction with the HCTZ as he is concerned with his GFR and Serum Creatinine levels

## 2019-02-06 NOTE — TELEPHONE ENCOUNTER
This is very confusing message  Please triage    I saw patient a week ago  I advised him to discontinue HCTZ and start Cardura for hypertension  I also prescribed him doxycycline for sinus infection    What is he actually taking and which interaction is he concerned about?

## 2019-02-06 NOTE — TELEPHONE ENCOUNTER
Harvey Mylene not taking cardura , is taking 25mg hctz prescribed by nephrologist    Should he continue taking doxycyline since has interactions or would you like to change meds  He just started doxy on Monday night   Please advise

## 2019-02-14 ENCOUNTER — TELEPHONE (OUTPATIENT)
Dept: FAMILY MEDICINE CLINIC | Facility: CLINIC | Age: 54
End: 2019-02-14

## 2019-02-14 DIAGNOSIS — D69.6 THROMBOCYTOPENIA (HCC): ICD-10-CM

## 2019-02-14 DIAGNOSIS — I10 ESSENTIAL HYPERTENSION: ICD-10-CM

## 2019-02-14 DIAGNOSIS — N18.30 CKD (CHRONIC KIDNEY DISEASE) STAGE 3, GFR 30-59 ML/MIN (HCC): Primary | ICD-10-CM

## 2019-02-14 NOTE — TELEPHONE ENCOUNTER
Patient is not available to come in for an appointment for today  Patient would like to know if you could give him a call after 5:30pm today  Please advise

## 2019-02-14 NOTE — TELEPHONE ENCOUNTER
Sergio Abarca would like a call after lunch to go over medication issues and changes with other  s as well as dr Claudetta Madrid

## 2019-02-16 LAB
CREAT ?TM UR-SCNC: 167 UMOL/L
EXT MICROALBUMIN URINE RANDOM: 0.3
MICROALBUMIN/CREAT UR: 2 MG/G{CREAT}

## 2019-02-16 NOTE — TELEPHONE ENCOUNTER
I spoke with patient  He had recent evaluation with Dermatology and Allergy/immunology  They do not believe that he has rash was related to Diovan a lisinopril  He was cleared to go back on those medications  Patient is concerned that his blood pressure currently is not well controlled on regimen of Norvasc 10 mg in the morning and Cardura 1 mg q h s     Patient states that his blood pressures are still ranging in 140s over 90s  He is interested in going back to brand name Diovan  No recent blood work, patient is due for labs as per Nephrology at [facility]  I advised patient to proceed with CBC, CMP, TSH 1st   Will reassess his GFR and coordinate care with Nephrology regarding restart of Diovan  For the time being, I advised him to increase dose of Cardura from 1 mg q h s  To 2 mg q h s     Patient understands instructions and agrees

## 2019-02-17 ENCOUNTER — TELEPHONE (OUTPATIENT)
Dept: FAMILY MEDICINE CLINIC | Facility: CLINIC | Age: 54
End: 2019-02-17

## 2019-02-17 LAB
BASOPHILS # BLD AUTO: 42 CELLS/UL (ref 0–200)
BASOPHILS NFR BLD AUTO: 0.8 %
EOSINOPHIL # BLD AUTO: 148 CELLS/UL (ref 15–500)
EOSINOPHIL NFR BLD AUTO: 2.8 %
ERYTHROCYTE [DISTWIDTH] IN BLOOD BY AUTOMATED COUNT: 12.6 % (ref 11–15)
HCT VFR BLD AUTO: 41.4 % (ref 38.5–50)
HGB BLD-MCNC: 14.3 G/DL (ref 13.2–17.1)
LYMPHOCYTES # BLD AUTO: 1420 CELLS/UL (ref 850–3900)
LYMPHOCYTES NFR BLD AUTO: 26.8 %
MCH RBC QN AUTO: 29.7 PG (ref 27–33)
MCHC RBC AUTO-ENTMCNC: 34.5 G/DL (ref 32–36)
MCV RBC AUTO: 85.9 FL (ref 80–100)
MONOCYTES # BLD AUTO: 790 CELLS/UL (ref 200–950)
MONOCYTES NFR BLD AUTO: 14.9 %
NEUTROPHILS # BLD AUTO: 2899 CELLS/UL (ref 1500–7800)
NEUTROPHILS NFR BLD AUTO: 54.7 %
PLATELET # BLD AUTO: 132 THOUSAND/UL (ref 140–400)
PMV BLD REES-ECKER: 11.7 FL (ref 7.5–12.5)
RBC # BLD AUTO: 4.82 MILLION/UL (ref 4.2–5.8)
TSH SERPL-ACNC: 1.93 MIU/L (ref 0.4–4.5)
WBC # BLD AUTO: 5.3 THOUSAND/UL (ref 3.8–10.8)

## 2019-02-18 ENCOUNTER — TELEPHONE (OUTPATIENT)
Dept: FAMILY MEDICINE CLINIC | Facility: CLINIC | Age: 54
End: 2019-02-18

## 2019-02-18 DIAGNOSIS — I10 ESSENTIAL HYPERTENSION: Primary | ICD-10-CM

## 2019-02-18 RX ORDER — VALSARTAN 160 MG/1
TABLET ORAL
Qty: 30 TABLET | Refills: 1 | Status: SHIPPED | OUTPATIENT
Start: 2019-02-18 | End: 2019-04-17 | Stop reason: SDUPTHER

## 2019-02-18 NOTE — TELEPHONE ENCOUNTER
I spoke with patient  I reviewed results of blood work  CBC was normal aside from mild thrombocytopenia  TSH is normal   CMP reveals creatinine 1 49 with a GFR 53, rest of CMP is normal, urine test is normal, urine microalbumin is negative  Patient states that are Cardura is causing insomnia  He remains on amlodipine 10 mg daily  He was seen by allergy immunology was clear to retry Ace or Arb  He prefers to use brown medication only  I am sending prescription for Diovan 160 mg tablets to the pharmacy  Patient will start with half a tablet once a day and will repeat BMP in 1 week  He understands instructions and agrees    Patient has used HCTZ for a while and discontinued medication few days prior to proceeding with blood work which could have caused the bump in his creatinine/GFR

## 2019-02-18 NOTE — TELEPHONE ENCOUNTER
Please contact [facility]  I received results of CBC and TSH  I am awaiting results of CMP that I have ordered but they did not send me results    Please ask to fax of forward electronically ASAP

## 2019-02-21 DIAGNOSIS — I10 ESSENTIAL HYPERTENSION: ICD-10-CM

## 2019-02-21 RX ORDER — DOXAZOSIN MESYLATE 1 MG/1
1 TABLET ORAL
Qty: 90 TABLET | Refills: 1 | OUTPATIENT
Start: 2019-02-21

## 2019-02-26 RX ORDER — LISINOPRIL 2.5 MG/1
2.5 TABLET ORAL
COMMUNITY
End: 2019-02-28

## 2019-02-26 RX ORDER — LOSARTAN POTASSIUM 100 MG/1
100 TABLET ORAL
COMMUNITY
Start: 2018-03-02 | End: 2019-02-28

## 2019-02-26 RX ORDER — NEBIVOLOL 10 MG/1
10 TABLET ORAL
COMMUNITY
Start: 2016-03-16 | End: 2019-02-28

## 2019-02-26 RX ORDER — BETAMETHASONE DIPROPIONATE 0.5 MG/G
OINTMENT TOPICAL
Refills: 0 | COMMUNITY
Start: 2019-02-16 | End: 2019-02-28

## 2019-02-26 RX ORDER — MULTIVITAMIN/IRON/FOLIC ACID 18MG-0.4MG
1 TABLET ORAL
COMMUNITY
End: 2019-02-28

## 2019-02-26 RX ORDER — ZINC GLUCONATE 50 MG
50 TABLET ORAL
COMMUNITY
End: 2019-02-28

## 2019-02-26 RX ORDER — HYDROCHLOROTHIAZIDE 12.5 MG/1
12.5 TABLET ORAL DAILY
COMMUNITY
Start: 2019-01-19 | End: 2019-02-28

## 2019-02-26 RX ORDER — TRIAMCINOLONE ACETONIDE 1 MG/G
CREAM TOPICAL
Refills: 1 | COMMUNITY
Start: 2019-02-08 | End: 2019-07-18

## 2019-02-28 ENCOUNTER — OFFICE VISIT (OUTPATIENT)
Dept: FAMILY MEDICINE CLINIC | Facility: CLINIC | Age: 54
End: 2019-02-28
Payer: COMMERCIAL

## 2019-02-28 VITALS
TEMPERATURE: 98.5 F | RESPIRATION RATE: 16 BRPM | HEIGHT: 67 IN | HEART RATE: 72 BPM | DIASTOLIC BLOOD PRESSURE: 80 MMHG | BODY MASS INDEX: 32.99 KG/M2 | SYSTOLIC BLOOD PRESSURE: 142 MMHG | WEIGHT: 210.2 LBS

## 2019-02-28 DIAGNOSIS — I10 ESSENTIAL HYPERTENSION: ICD-10-CM

## 2019-02-28 DIAGNOSIS — N18.30 CKD (CHRONIC KIDNEY DISEASE) STAGE 3, GFR 30-59 ML/MIN (HCC): Primary | ICD-10-CM

## 2019-02-28 DIAGNOSIS — M25.512 BILATERAL SHOULDER PAIN, UNSPECIFIED CHRONICITY: ICD-10-CM

## 2019-02-28 DIAGNOSIS — K62.89 ANAL OR RECTAL PAIN: ICD-10-CM

## 2019-02-28 DIAGNOSIS — N40.0 BENIGN PROSTATIC HYPERPLASIA, UNSPECIFIED WHETHER LOWER URINARY TRACT SYMPTOMS PRESENT: ICD-10-CM

## 2019-02-28 DIAGNOSIS — M25.511 BILATERAL SHOULDER PAIN, UNSPECIFIED CHRONICITY: ICD-10-CM

## 2019-02-28 PROCEDURE — 3008F BODY MASS INDEX DOCD: CPT | Performed by: FAMILY MEDICINE

## 2019-02-28 PROCEDURE — 99214 OFFICE O/P EST MOD 30 MIN: CPT | Performed by: FAMILY MEDICINE

## 2019-02-28 NOTE — PROGRESS NOTES
FAMILY PRACTICE OFFICE VISIT       NAME: Dustin Muniz  AGE: 48 y o  SEX: male       : 1965        MRN: [id number]        Assessment and Plan     Problem List Items Addressed This Visit        Cardiovascular and Mediastinum    Essential hypertension     Patient was evaluated by [facility], last visit 2018 Stress ECHO was normal 2016; ejection fraction is normal  Renal artery scan -  2015 - NORMAL  Continue regimen of Diovan 160 mg once a day and Norvasc 10 mg daily, follow BMP, pending evaluation by Nephrology at [facility]         Relevant Orders    Ambulatory referral to Nephrology    Urinalysis with microscopic    Cystatin C With eGFR       Genitourinary    CKD (chronic kidney disease) stage 3, GFR 30-59 ml/min ([facility] [address] ) - Primary     Renal ultrasound 2015:  No medical renal disease  Renal artery scan :  No evidence of JOSE         Relevant Orders    Ambulatory referral to Nephrology    Urinalysis with microscopic    Cystatin C With eGFR    Benign prostatic hyperplasia     Patient remains under care of Urology  Elevated PSA, patient is followed with blood work and prostate MRI, he is under care of [facility] and [facility] teams           Other Visit Diagnoses     Bilateral shoulder pain, unspecified chronicity        Relevant Orders    Ambulatory referral to Physical Therapy    Anal or rectal pain        Relevant Orders    Ambulatory referral to Colorectal Surgery       Patient presents for follow-up of hypertension with CKD  Keyur Mendoza He has been feeling generally well  Improved blood pressures on regimen of Diovan and amlodipine  Pending blood work to reassess BMP, patient is back on Diovan  He was cleared to retry ARB by Allergy/immunology  Will follow BMP/GFR  Will check UA and Cystatin C  Pending evaluation by Nephrology at [facility]  Referral to Colorectal evaluation due to intermittent symptoms of anal/rectal pain    Patient remains under care of Urology for BPH which could be contributing to patient's rectal discomfort as decrease of GFR  I have spent 25 minutes with Patient  today in which greater than 50% of this time was spent in counseling/coordination of care regarding Diagnostic results, Prognosis, Risks and benefits of tx options, Intructions for management, Patient and family education, Importance of tx compliance, Risk factor reductions and Impressions  There are no Patient Instructions on file for this visit  [employer] software was used to dictate this note  It may contain errors with dictating incorrect words/spelling  Please contact provider directly with any questions  Chief Complaint     Chief Complaint   Patient presents with    Follow-up    Shoulder Pain       History of Present Illness     Patient presents for follow-up  Recent evaluation by Allergy immunology, patient was cleared to restart brand name Diovan, no recurrences of rashes  CKD:  Recent evaluation at [facility], patient prefers to switch his care closer to home  He will be proceeding with evaluation by Nephrology at [address] [place] [address]  He has started Diovan 80 mg once a day and noticed that his blood pressure is not well controlled, patient increase dose up to 160 mg daily  He remains on amlodipine 10 mg once a day  Blood pressure 148/91, but improved yesterday afternoon down to 122/80  Last renal ultrasound available tor me is November 2015, no evidence of medical renal disease, angiomyolipoma described in the past is no longer visualized  Intermittent rectal pain, worse with defecation, no blood per rectum, no melena, no abdominal pain  Patient is up-to-date with  Colonoscopy 2015 -- DR Ash    Patient denies chest pain, palpitations, shortness of breath or dizziness  He has discontinued HCTZ  Results of recent blood work performed on February 16th discussed    Creatinine 1 49 with GFR of 53, which patient attributes to recent use of HCTZ   He has tried Cardura 1-2 mg q h s     Medication worked very well and helped with symptoms of chronic BPH but unfortunately cause side effects of insomnia  Patient remains under care of [facility] and [facility]rology at [facility] due to BPH and elevated PSA  He is being followed with blood work and prostate MRI  Patient is requesting referral for physical therapy for chronic shoulder tendinitis, PT worked well for similar symptoms in the past   No recent injury or fall  Patient reports painful range of motion especially with abduction and above head/behind back range of motion  Shoulder Pain          Review of Systems   Review of Systems   Constitutional: Negative  HENT: Negative  Respiratory: Negative  Cardiovascular: Negative  Gastrointestinal: Negative for abdominal pain and anal bleeding  Anal/rectal pain, no bright red blood per rectum   Musculoskeletal: Positive for arthralgias (Shoulder pain)  Neurological: Negative  Hematological: Negative  Psychiatric/Behavioral: Negative          Active Problem List     Patient Active Problem List   Diagnosis    Essential hypertension    Myofascial pain syndrome    CKD (chronic kidney disease) stage 3, GFR 30-59 ml/min ([facility])    Benign prostatic hyperplasia    Elevated PSA    Angiomyolipoma of kidney    Erectile dysfunction    Impingement syndrome of right shoulder    Thrombocytopenia (HCC)    Urticaria       Past Medical History:  Past Medical History:   Diagnosis Date    Benign prostatic hyperplasia     Herpes simplex     RESOLVED [id number]    Hypertension     Renal disorder     Vitamin D deficiency        Past Surgical History:  Past Surgical History:   Procedure Laterality Date    COLON SURGERY      COLONOSCOPY  12/23/2015    DR [facility]    PROSTATE BIOPSY         Family History:  Family History   Problem Relation Age of Onset    Other Mother         CABG    Diabetes Mother     Hypertension Mother  Hypertension Father     Kidney cancer Family     Prostate cancer Family     Hypertension Brother        Social History:  Social History     Socioeconomic History    Marital status: /Civil Union     Spouse name: Not on file    Number of children: Not on file    Years of education: Not on file    Highest education level: Not on file   Occupational History    Occupation: /[employer]   Social Needs    Financial resource strain: Not on file    Food insecurity:     Worry: Not on file     Inability: Not on file    Transportation needs:     Medical: Not on file     Non-medical: Not on file   Tobacco Use    Smoking status: Never Smoker    Smokeless tobacco: Never Used   Substance and Sexual Activity    Alcohol use: No    Drug use: No    Sexual activity: Not on file   Lifestyle    Physical activity:     Days per week: Not on file     Minutes per session: Not on file    Stress: Not on file   Relationships    Social connections:     Talks on phone: Not on file     Gets together: Not on file     Attends Oriental orthodox service: Not on file     Active member of club or organization: Not on file     Attends meetings of clubs or organizations: Not on file     Relationship status: Not on file    Intimate partner violence:     Fear of current or ex partner: Not on file     Emotionally abused: Not on file     Physically abused: Not on file     Forced sexual activity: Not on file   Other Topics Concern    Not on file   Social History Narrative    Not on file         Objective     Vitals:    02/28/19 1449 02/28/19 1525   BP: 128/88 142/80   Pulse: 72    Resp: 16    Temp: 98 5 °F (36 9 °C)    TempSrc: Tympanic    Weight: 95 3 kg (210 lb 3 2 oz)    Height: 5' 7" (1 702 m)      Wt Readings from Last 3 Encounters:   02/28/19 95 3 kg (210 lb 3 2 oz)   01/29/19 95 2 kg (209 lb 12 8 oz)   01/19/19 95 8 kg (211 lb 3 2 oz)       Physical Exam   Constitutional: He is oriented to person, place, and time   He appears well-developed and well-nourished  HENT:   Head: Normocephalic and atraumatic  Eyes: Conjunctivae are normal    Neck: Neck supple  Carotid bruit is not present  Cardiovascular: Normal rate, regular rhythm and normal heart sounds  No murmur heard  Pulmonary/Chest: Effort normal and breath sounds normal  No respiratory distress  He has no wheezes  He has no rales  Abdominal: He exhibits no distension and no abdominal bruit  Musculoskeletal: Normal range of motion  He exhibits no edema  Left shoulder:  Decreased range of motion with abduction and overhead/behind back range of motion   Neurological: He is alert and oriented to person, place, and time  No cranial nerve deficit  Psychiatric: He has a normal mood and affect  His behavior is normal    Nursing note and vitals reviewed        Pertinent Laboratory/Diagnostic Studies:  Lab Results   Component Value Date    GLUCOSE 97 01/20/2014    BUN 20 04/28/2018    BUN 20 04/28/2018    CREATININE 1 39 (H) 12/21/2017    CALCIUM 9 5 04/28/2018    CALCIUM 9 7 04/28/2018     12/21/2017    K 4 1 04/28/2018    K 4 1 04/28/2018    CO2 28 04/28/2018    CO2 28 04/28/2018     04/28/2018     04/28/2018     Lab Results   Component Value Date    ALT 31 12/21/2017    AST 24 12/21/2017    ALKPHOS 41 12/21/2017    BILITOT 0 8 12/21/2017       Lab Results   Component Value Date    WBC 5 3 02/16/2019    HGB 14 3 02/16/2019    HCT 41 4 02/16/2019    MCV 85 9 02/16/2019     (L) 02/16/2019       Lab Results   Component Value Date    TSH 1 93 02/16/2019       Lab Results   Component Value Date    CHOL 158 07/15/2017     Lab Results   Component Value Date    TRIG 41 04/28/2018     Lab Results   Component Value Date    HDL 56 04/28/2018     Lab Results   Component Value Date    LDLCALC 101 01/20/2014     Lab Results   Component Value Date    HGBA1C 5 0 04/28/2018       Results for orders placed or performed in visit on 02/16/19   Microalbumin / creatinine urine ratio   Result Value Ref Range    EXT Creatinine Urine 167     Microalbum  ,U,Random 0 3     EXTERNAL Microalb/Creat Ratio 2        Orders Placed This Encounter   Procedures    Urinalysis with microscopic    Cystatin C With eGFR    Ambulatory referral to Nephrology    Ambulatory referral to Physical Therapy    Ambulatory referral to Colorectal Surgery       ALLERGIES:  Allergies   Allergen Reactions    Moxifloxacin Itching and Edema     Category: Allergy;     Penicillins Anaphylaxis    Lisinopril Rash    Valsartan Rash       Current Medications     Current Outpatient Medications   Medication Sig Dispense Refill    amLODIPine (NORVASC) 10 mg tablet TAKE 1 TABLET (10 MG TOTAL) BY MOUTH DAILY 90 tablet 1    Cholecalciferol (VITAMIN D3) 2000 units TABS Take 1 tablet by mouth daily        DIOVAN 160 MG tablet Take 1 tablet daily, brand necessary 30 tablet 1    Misc Natural Products (TURMERIC CURCUMIN) CAPS Take 1 capsule by mouth 2 (two) times a day 2400mg twice daily       triamcinolone (KENALOG) 0 1 % cream APPLY 1-2X A DAY FOR ITCHING X 1-2 WEEKS AND THEN REDUCE TO 3 TIMES A WEEK IF STILL NEEDED  1     No current facility-administered medications for this visit            Health Maintenance     Health Maintenance   Topic Date Due    BMI: Followup Plan  12/07/1983    DTaP,Tdap,and Td Vaccines (1 - Tdap) 12/07/1986    INFLUENZA VACCINE  07/01/2018    Depression Screening PHQ  01/19/2020    BMI: Adult  02/28/2020    CRC Screening: Colonoscopy  12/23/2020    Hepatitis C Screening  Completed    HEPATITIS B VACCINES  Aged Out     Immunization History   Administered Date(s) Administered    Influenza Quadrivalent Preservative Free 3 years and older IM 10/20/2017    Influenza TIV (IM) 08/20/2016       Amanda Lee MD

## 2019-03-03 ENCOUNTER — TELEPHONE (OUTPATIENT)
Dept: FAMILY MEDICINE CLINIC | Facility: CLINIC | Age: 54
End: 2019-03-03

## 2019-03-03 PROBLEM — N40.0 BENIGN PROSTATIC HYPERPLASIA WITHOUT LOWER URINARY TRACT SYMPTOMS: Status: RESOLVED | Noted: 2018-04-04 | Resolved: 2019-03-03

## 2019-03-03 PROBLEM — N28.9 RENAL INSUFFICIENCY: Status: RESOLVED | Noted: 2018-08-01 | Resolved: 2019-03-03

## 2019-03-04 NOTE — ASSESSMENT & PLAN NOTE
Patient remains under care of Urology  Elevated PSA, patient is followed with blood work and prostate MRI, he is under care of [facility] and [facility] teams

## 2019-03-04 NOTE — ASSESSMENT & PLAN NOTE
Renal ultrasound November 2015:  No medical renal disease    Renal artery scan :  No evidence of JOSE

## 2019-03-11 ENCOUNTER — EVALUATION (OUTPATIENT)
Dept: PHYSICAL THERAPY | Facility: REHABILITATION | Age: 54
End: 2019-03-11
Payer: COMMERCIAL

## 2019-03-11 DIAGNOSIS — M25.511 BILATERAL SHOULDER PAIN, UNSPECIFIED CHRONICITY: ICD-10-CM

## 2019-03-11 DIAGNOSIS — M75.42 IMPINGEMENT SYNDROME OF LEFT SHOULDER: Primary | ICD-10-CM

## 2019-03-11 DIAGNOSIS — M25.512 BILATERAL SHOULDER PAIN, UNSPECIFIED CHRONICITY: ICD-10-CM

## 2019-03-11 PROCEDURE — 97162 PT EVAL MOD COMPLEX 30 MIN: CPT | Performed by: PHYSICAL THERAPIST

## 2019-03-11 PROCEDURE — 97110 THERAPEUTIC EXERCISES: CPT | Performed by: PHYSICAL THERAPIST

## 2019-03-11 NOTE — PROGRESS NOTES
PT Evaluation     Today's date: 3/11/2019  Patient name: Chaya Crawford  : 1965  MRN: [id number]  Referring provider: Joe Nance MD  Dx:   Encounter Diagnosis     ICD-10-CM    1  Impingement syndrome of left shoulder M75 42    2  Bilateral shoulder pain, unspecified chronicity M25 511 Ambulatory referral to Physical Therapy    M25 512                   Assessment  Assessment details: Chaya Crawford is a 48 y o  male presenting to physical therapy with left shoulder pain, decreased range of motion, decreased strength, decreased activity tolerance and poor posture  Assessment indicative of left shoulder impingement as per special testing and postural faults  Secondary to these impairments, patient has increased difficulty performing ADL's, work functions and household chores  Trisha Colon would benefit from skilled PT to address these issues and maximize function  Thank you for the referral     Impairments: abnormal or restricted ROM, abnormal movement, activity intolerance, impaired physical strength, pain with function and scapular dyskinesis  Understanding of Dx/Px/POC: excellent  Goals  STG (4 weeks)  1  Patient will be independent with HEP  2  Decrease pain at worst by 2 points on NPRS  3  Increase left shoulder pain free abduction from 110 degrees to > 160 degrees  4  Patient will demonstrate ability to perform bench press without symptom provocation  LTG (8 weeks)  1  Decrease pain at worst from 4 points on NPRS  2  Patient will demonstrate full left shoulder pain free AROM in all planes  3  Increase left shoulder IR PROM = right shoulder IR PROM  4   Increase FOTO > or equal to expected outcome    Plan  Patient would benefit from: skilled PT  Planned therapy interventions: joint mobilization, manual therapy, neuromuscular re-education, patient education, postural training, strengthening, stretching, therapeutic exercise, home exercise program and ADL training  Frequency: 2x week  Duration in weeks: 8  Treatment plan discussed with: patient        Subjective Evaluation    History of Present Illness  Mechanism of injury: Patient reports that he has been experiencing left shoulder discomfort with overhead lifting or sleeping on that side  Patient states that it feels similar to his right shoulder impingement he had in the past   Patient denies neck involvement or radiating symptoms into his UE  Patient works primarily performing desk work and demonstrates poor static posture with rounding shoulders  Patients goals for PT are to reduce pain with shoulder movement and return to PLOF     Pain  Current pain ratin  At best pain ratin  At worst pain ratin  Quality: dull ache  Aggravating factors: overhead activity and lifting  Progression: worsening    Hand dominance: right      Diagnostic Tests  No diagnostic tests performed  Treatments  No previous or current treatments  Current treatment: massage  Patient Goals  Patient goals for therapy: increased strength, return to sport/leisure activities, decreased pain and improved balance          Objective     Postural Observations  Seated posture: poor  Standing posture: fair        Cervical/Thoracic Screen   Cervical range of motion within normal limits  Thoracic range of motion within normal limits with the following exceptions: (+) hypomobility T1-T4 as per [place] spring testing    Neurological Testing     Sensation     Shoulder   Left Shoulder   Intact: light touch    Right Shoulder   Intact: light touch    Reflexes   Left   Biceps (C5/C6): normal (2+)  Brachioradialis (C6): normal (2+)  Laguerre's reflex: negative    Right   Biceps (C5/C6): normal (2+)  Brachioradialis (C6): normal (2+)  Laguerre's reflex: negative    Active Range of Motion   Left Shoulder   Flexion: WFL and with pain  Abduction: WFL and with pain    Right Shoulder   Flexion: WFL  Abduction: WFL    Passive Range of Motion   Left Shoulder   Internal rotation 90°: 35 degrees     Right Shoulder Internal rotation 90°: 45 degrees     Scapular Mobility   Left Shoulder   Scapular mobility: good  Scapular Mobility with Shoulder to 90° FF   Scapular winging: minimal    Scapular Mobility beyond 90° FF   Scapular winging: minimal  Scapular elevation: minimal    Strength/Myotome Testing     Left Shoulder     Planes of Motion   Flexion: 4+   Abduction: 4+   External rotation at 90°: 4   Internal rotation at 90°: 4+     Right Shoulder     Planes of Motion   Flexion: 4+   Abduction: 4+   External rotation at 90°: 4+   Internal rotation at 90°: 4+     Tests     Left Shoulder   Positive full can, Hawkin's, Neer's, painful arc and Speed's  Negative apprehension, belly press, crank and drop arm             Precautions: HTN, CKD III, Elevated PSA, Thrombocytopenia    Daily Treatment Diary     Manual  3/11            L pec release             L lat release             L IR stretching             L posterior capsule JM                              Exercise Diary  3/11            Postural correction on UBE             Sleeper stretch 4x30"            S/L ER 8#  2 sets to fatigue            Pec corner stretch 4x30"            Prone middle trap             LT wall slides w/ towel                                                                                                                                                                                                       Modalities

## 2019-03-13 LAB
BUN SERPL-MCNC: 20 MG/DL (ref 7–25)
BUN/CREAT SERPL: 14 (CALC) (ref 6–22)
CALCIUM SERPL-MCNC: 9.4 MG/DL (ref 8.6–10.3)
CHLORIDE SERPL-SCNC: 103 MMOL/L (ref 98–110)
CO2 SERPL-SCNC: 29 MMOL/L (ref 20–32)
CREAT SERPL-MCNC: 1.43 MG/DL (ref 0.7–1.33)
CYSTATIN C SERPL-MCNC: 1.1 MG/L (ref 0.52–1.23)
GFR/BSA.PRED SERPLBLD CYS-BASED-ARV: 70 ML/MIN/1.73M2
GLUCOSE SERPL-MCNC: 88 MG/DL (ref 65–99)
POTASSIUM SERPL-SCNC: 4.2 MMOL/L (ref 3.5–5.3)
SL AMB EGFR AFRICAN AMERICAN: 64 ML/MIN/1.73M2
SL AMB EGFR NON AFRICAN AMERICAN: 56 ML/MIN/1.73M2
SODIUM SERPL-SCNC: 140 MMOL/L (ref 135–146)

## 2019-03-14 ENCOUNTER — OFFICE VISIT (OUTPATIENT)
Dept: PHYSICAL THERAPY | Facility: REHABILITATION | Age: 54
End: 2019-03-14
Payer: COMMERCIAL

## 2019-03-14 DIAGNOSIS — M25.512 BILATERAL SHOULDER PAIN, UNSPECIFIED CHRONICITY: ICD-10-CM

## 2019-03-14 DIAGNOSIS — M25.511 BILATERAL SHOULDER PAIN, UNSPECIFIED CHRONICITY: ICD-10-CM

## 2019-03-14 DIAGNOSIS — M75.42 IMPINGEMENT SYNDROME OF LEFT SHOULDER: Primary | ICD-10-CM

## 2019-03-14 PROCEDURE — 97140 MANUAL THERAPY 1/> REGIONS: CPT | Performed by: PHYSICAL THERAPIST

## 2019-03-14 PROCEDURE — 97110 THERAPEUTIC EXERCISES: CPT | Performed by: PHYSICAL THERAPIST

## 2019-03-14 PROCEDURE — 97112 NEUROMUSCULAR REEDUCATION: CPT | Performed by: PHYSICAL THERAPIST

## 2019-03-14 NOTE — PROGRESS NOTES
Daily Note     Today's date: 3/14/2019  Patient name: Gayle Pham  : 1965  MRN: [id number]  Referring provider: Sukumar Cornejo MD  Dx:   Encounter Diagnosis     ICD-10-CM    1  Impingement syndrome of left shoulder M75 42    2  Bilateral shoulder pain, unspecified chronicity M25 511     M25 512                   Subjective: Patient states that his shoulder is feeling better secondary to compliance with his HEP  Objective: See treatment diary below      Assessment: Patient demonstrating improved pec length and shoulder IR secondary to sleeper compliance  Progressed all TE with cueing required to prevent compensations throughout  Plan: Continue per plan of care       Precautions: HTN, CKD III, Elevated PSA, Thrombocytopenia    Daily Treatment Diary     Manual  3/14            L pec release 5'            L lat release 5'            L IR stretching 4x30"            L posterior capsule JM Grade IV                             Exercise Diary  3/11 3/14           Postural correction on UBE             Sleeper stretch 4x30" 4x30"           S/L ER 8#  2 sets to fatigue 8#  3 sets to fatigue           Pec corner stretch 4x30" 4x30"           Prone middle trap 5#  3x10 5#  3 sets to fatigue           LT wall slides w/ towel 3 sets to fatigue 3 sets to fatigue           90-90 ER w/ BTB 3 sets to fatigue 3 sets to fatigue                                                                                                                                                                                        Modalities

## 2019-03-18 DIAGNOSIS — I10 ESSENTIAL HYPERTENSION: ICD-10-CM

## 2019-03-18 RX ORDER — AMLODIPINE BESYLATE 10 MG/1
10 TABLET ORAL DAILY
Qty: 90 TABLET | Refills: 1 | Status: SHIPPED | OUTPATIENT
Start: 2019-03-18 | End: 2019-09-30 | Stop reason: SDUPTHER

## 2019-03-21 ENCOUNTER — TELEPHONE (OUTPATIENT)
Dept: UROLOGY | Facility: AMBULATORY SURGERY CENTER | Age: 54
End: 2019-03-21

## 2019-03-21 NOTE — TELEPHONE ENCOUNTER
Spoke to pt, having to get up in the middle of the night with the urge to urinate but can't  Unable to take alpha blockers, cause insomnia  Requesting to be seen today or tomorrow  Appt with Dr Seferino Jade 3/22

## 2019-03-22 ENCOUNTER — OFFICE VISIT (OUTPATIENT)
Dept: UROLOGY | Facility: MEDICAL CENTER | Age: 54
End: 2019-03-22
Payer: COMMERCIAL

## 2019-03-22 ENCOUNTER — OFFICE VISIT (OUTPATIENT)
Dept: PHYSICAL THERAPY | Facility: REHABILITATION | Age: 54
End: 2019-03-22
Payer: COMMERCIAL

## 2019-03-22 VITALS
HEART RATE: 67 BPM | DIASTOLIC BLOOD PRESSURE: 80 MMHG | HEIGHT: 67 IN | BODY MASS INDEX: 31.86 KG/M2 | WEIGHT: 203 LBS | SYSTOLIC BLOOD PRESSURE: 140 MMHG

## 2019-03-22 DIAGNOSIS — M25.511 BILATERAL SHOULDER PAIN, UNSPECIFIED CHRONICITY: ICD-10-CM

## 2019-03-22 DIAGNOSIS — M75.42 IMPINGEMENT SYNDROME OF LEFT SHOULDER: Primary | ICD-10-CM

## 2019-03-22 DIAGNOSIS — N40.1 BPH WITH OBSTRUCTION/LOWER URINARY TRACT SYMPTOMS: Primary | ICD-10-CM

## 2019-03-22 DIAGNOSIS — N13.8 BPH WITH OBSTRUCTION/LOWER URINARY TRACT SYMPTOMS: Primary | ICD-10-CM

## 2019-03-22 DIAGNOSIS — R97.20 ELEVATED PSA: ICD-10-CM

## 2019-03-22 DIAGNOSIS — M25.512 BILATERAL SHOULDER PAIN, UNSPECIFIED CHRONICITY: ICD-10-CM

## 2019-03-22 DIAGNOSIS — N18.30 CKD (CHRONIC KIDNEY DISEASE) STAGE 3, GFR 30-59 ML/MIN (HCC): ICD-10-CM

## 2019-03-22 PROBLEM — K64.5 HEMORRHOIDS, EXTERNAL, THROMBOSED: Status: ACTIVE | Noted: 2019-03-22

## 2019-03-22 LAB
SL AMB  POCT GLUCOSE, UA: NORMAL
SL AMB LEUKOCYTE ESTERASE,UA: NORMAL
SL AMB POCT BILIRUBIN,UA: NORMAL
SL AMB POCT BLOOD,UA: NORMAL
SL AMB POCT CLARITY,UA: CLEAR
SL AMB POCT COLOR,UA: YELLOW
SL AMB POCT KETONES,UA: NORMAL
SL AMB POCT NITRITE,UA: NORMAL
SL AMB POCT PH,UA: 6.5
SL AMB POCT SPECIFIC GRAVITY,UA: 1.01
SL AMB POCT URINE PROTEIN: NORMAL
SL AMB POCT UROBILINOGEN: 0.2

## 2019-03-22 PROCEDURE — 99214 OFFICE O/P EST MOD 30 MIN: CPT | Performed by: UROLOGY

## 2019-03-22 PROCEDURE — 97110 THERAPEUTIC EXERCISES: CPT | Performed by: PHYSICAL THERAPIST

## 2019-03-22 PROCEDURE — 81003 URINALYSIS AUTO W/O SCOPE: CPT | Performed by: UROLOGY

## 2019-03-22 PROCEDURE — 97140 MANUAL THERAPY 1/> REGIONS: CPT | Performed by: PHYSICAL THERAPIST

## 2019-03-22 RX ORDER — ALFUZOSIN HYDROCHLORIDE 10 MG/1
10 TABLET, EXTENDED RELEASE ORAL DAILY
Qty: 90 TABLET | Refills: 3 | Status: SHIPPED | OUTPATIENT
Start: 2019-03-22 | End: 2019-03-30

## 2019-03-22 NOTE — ASSESSMENT & PLAN NOTE
AUA symptom score is 17  He is not satisfied with his voiding pattern and is having side effects from Flomax  He does wish to try another medication  I recommended a trial of Uroxatral   Use and side effects were discussed and he will try this medication  We also discussed minimally invasive options  Urolift was described information given  At the conclusion were discussion he was interested in pursuing a urolift evaluation including cystoscopy, transrectal ultrasound and uroflow study

## 2019-03-22 NOTE — PATIENT INSTRUCTIONS
Alfuzosin (By mouth)   Alfuzosin ([id number]-HVA-ibb-sin)  Treats problems with urination caused by an enlarged prostate (also called benign prostatic hyperplasia, or BPH)  This medicine is an alpha-blocker  Brand Name(s): Uroxatral   There may be other brand names for this medicine  When This Medicine Should Not Be Used: This medicine is not right for everyone  Do not use it if you had an allergic reaction to alfuzosin, or if you have moderate to severe liver disease  How to Use This Medicine:   [address]  · Your doctor will tell you how much medicine to use  Do not use more than directed  · It is best to take this medicine with food or milk  Take the medicine right after the same meal every day  · Swallow the extended-release tablet whole  Do not crush, break, or chew it  · Read and follow the patient instructions that come with this medicine  Talk to your doctor or pharmacist if you have any questions  · Missed dose: Take a dose as soon as you remember  If it is almost time for your next dose, wait until then and take a regular dose  Do not take extra medicine to make up for a missed dose  · Store the medicine in a closed container at room temperature, away from heat, moisture, and direct light  Drugs and Foods to Avoid:   Ask your doctor or pharmacist before using any other medicine, including over-the-counter medicines, vitamins, and herbal products  · Do not use alfuzosin if you are taking ketoconazole, itraconazole, or ritonavir  · Some medicines can affect how alfuzosin works  Tell your doctor if you are taking any of the following:   ¨ Angina medicine  ¨ Blood pressure medicine  ¨ Cimetidine  ¨ Diltiazem  ¨ Erectile dysfunction medicine  Warnings While Using This Medicine:   · Tell your doctor if you have kidney disease, liver disease, severe chest pain (angina), low blood pressure, or a family history of a heart condition called congenital QT prolongation    · This medicine may make you dizzy or lightheaded  Do not drive or do anything that could be dangerous until you know how this medicine affects you  Get up slowly from a sitting or lying position  If you feel dizzy, lie down so you do not faint  Then sit for a few moments before you stand up  · Tell any doctor who treats you that you are using this medicine or used it in recent months  Alfuzosin may cause a serious eye problem called Intraoperative Floppy Iris Syndrome (IFIS) after cataract surgery  · Seek immediate medical attention if you have a prolonged erection while you are taking this medicine  The condition must be treated right away to prevent permanent impotence  · Your doctor will check your progress and the effects of this medicine at regular visits  Keep all appointments  · Keep all medicine out of the reach of children  Never share your medicine with anyone  Possible Side Effects While Using This Medicine:   Call your doctor right away if you notice any of these side effects:  · Allergic reaction: Itching or hives, swelling in your face or hands, swelling or tingling in your mouth or throat, chest tightness, trouble breathing  · Chest pain, discomfort, tightness, or heaviness  · Decrease in how much or how often you urinate  · Fast, pounding, or uneven heartbeat  · Fever, chills, cough, stuffy or runny nose, sore throat, and body aches  · Lightheadedness, dizziness, or fainting  · Nausea, shortness of breath, or sweating  · Painful, prolonged erection of your penis  · Pain in your arm, back, or jaw  · Unusual tiredness or weakness  If you notice these less serious side effects, talk with your doctor:   · Headache  · Trouble having sex  If you notice other side effects that you think are caused by this medicine, tell your doctor  Call your doctor for medical advice about side effects   You may report side effects to FDA at 3-656-FDA-2712  © 2017 [employer] Information is for End User's use only and may not be sold, redistributed or otherwise used for commercial purposes  The above information is an  only  It is not intended as medical advice for individual conditions or treatments  Talk to your doctor, nurse or pharmacist before following any medical regimen to see if it is safe and effective for you

## 2019-03-22 NOTE — PROGRESS NOTES
Assessment/Plan:    BPH with obstruction/lower urinary tract symptoms  AUA symptom score is 17  He is not satisfied with his voiding pattern and is having side effects from Flomax  He does wish to try another medication  I recommended a trial of Uroxatral   Use and side effects were discussed and he will try this medication  We also discussed minimally invasive options  Urolift was described information given  At the conclusion were discussion he was interested in pursuing a urolift evaluation including cystoscopy, transrectal ultrasound and uroflow study  Angiomyolipoma of kidney  He has a history of angiomyolipoma  We will reassess on ultrasound at this time  Elevated PSA  The patient has been followed at [facility] for PSA elevation  He has had a prior biopsy that revealed a 60 g prostate  An MRI was done at [facility] with results not available  We will plan to repeat a PSA at this time  His digital rectal examination is benign    CKD (chronic kidney disease) stage 3, GFR 30-59 ml/min  The patient has chronic kidney disease and has not had a recent renal ultrasound  He has been having some flank pain and is concerned about whether BPH is contributing to his symptoms  We will obtain a renal ultrasound at this time  Diagnoses and all orders for this visit:    BPH with obstruction/lower urinary tract symptoms  -     POCT urine dip auto non-scope  -     alfuzosin (UROXATRAL) 10 mg 24 hr tablet; Take 1 tablet (10 mg total) by mouth daily  -     PSA Total, Diagnostic; Future    Elevated PSA    CKD (chronic kidney disease) stage 3, GFR 30-59 ml/min ([facility])  -     US retroperitoneal complete; Future          Subjective:      Patient ID: Gayle Pham is a 48 y o  male  Benign Prostatic Hypertrophy   This is a chronic problem  The current episode started more than 1 year ago  The problem has been gradually worsening since onset  Irritative symptoms include nocturia   Irritative symptoms do not include frequency or urgency  noct x 1  Obstructive symptoms include incomplete emptying and a weak stream  Obstructive symptoms do not include dribbling or an intermittent stream  Pertinent negatives include no chills, dysuria, genital pain, hematuria, hesitancy, nausea or vomiting  AUA score is 8-19  His sexual activity is non-contributory to the current illness  Nothing aggravates the symptoms  Past treatments include tamsulosin  The treatment provided moderate relief  He has been using treatment for 1 to 6 months (flomax is causing side effects)  The following portions of the patient's history were reviewed and updated as appropriate: allergies, current medications, past family history, past medical history, past social history, past surgical history and problem list     Review of Systems   Constitutional: Negative for chills, diaphoresis, fatigue and fever  HENT: Negative  Eyes: Negative  Respiratory: Negative  Cardiovascular: Negative  Gastrointestinal: Negative for nausea and vomiting  Endocrine: Negative  Genitourinary: Positive for incomplete emptying and nocturia  Negative for dysuria, frequency, hematuria, hesitancy and urgency  See HPI   Musculoskeletal: Negative  Skin: Negative  Allergic/Immunologic: Negative  Neurological: Negative  Hematological: Negative  Psychiatric/Behavioral: Negative  AUA SYMPTOM SCORE      Most Recent Value   AUA SYMPTOM SCORE   How often have you had a sensation of not emptying your bladder completely after you finished urinating? 3   How often have you had to urinate again less than two hours after you finished urinating? 3   How often have you found you stopped and started again several times when you urinate? 2   How often have you found it difficult to postpone urination? 3   How often have you had a weak urinary stream?  4   How often have you had to push or strain to begin urination?   1   How many times did you most typically get up to urinate from the time you went to bed at night until the time you got up in the morning? 1   Quality of Life: If you were to spend the rest of your life with your urinary condition just the way it is now, how would you feel about that?  4   AUA SYMPTOM SCORE  17        Objective:      /80 (BP Location: Left arm, Patient Position: Sitting, Cuff Size: Adult)   Pulse 67   Ht 5' 7" (1 702 m)   Wt 92 1 kg (203 lb)   BMI 31 79 kg/m²          Physical Exam   Constitutional: He is oriented to person, place, and time  He appears well-developed and well-nourished  HENT:   Head: Normocephalic and atraumatic  Eyes: Conjunctivae are normal    Neck: Neck supple  Cardiovascular: Normal rate  Pulmonary/Chest: Effort normal    Abdominal: Soft  Bowel sounds are normal  He exhibits no distension and no mass  There is no tenderness  There is no rebound, no guarding and no CVA tenderness  Genitourinary: Rectum normal, testes normal and penis normal  Right testis shows no mass  Left testis shows no mass  No phimosis or hypospadias  Genitourinary Comments: Prostate 2 times enlarged and palpably benign   Musculoskeletal: He exhibits no edema  Neurological: He is alert and oriented to person, place, and time  Skin: Skin is warm and dry  Psychiatric: He has a normal mood and affect  His behavior is normal  Judgment and thought content normal    Vitals reviewed

## 2019-03-22 NOTE — ASSESSMENT & PLAN NOTE
The patient has chronic kidney disease and has not had a recent renal ultrasound  He has been having some flank pain and is concerned about whether BPH is contributing to his symptoms  We will obtain a renal ultrasound at this time

## 2019-03-22 NOTE — LETTER
March 22, 2019     Meghan Fields MD  [address] [address] [place] [address]    Patient: Fabiano Soler   YOB: 1965   Date of Visit: 3/22/2019       Dear Dr Romi Hagen: Thank you for referring Abi Masterson to me for evaluation  Below are my notes for this consultation  If you have questions, please do not hesitate to call me  I look forward to following your patient along with you  Sincerely,        Lacey Leach MD        CC: No Recipients  Lacey Leach MD  3/22/2019 10:08 AM  Sign at close encounter  Assessment/Plan:    BPH with obstruction/lower urinary tract symptoms  AUA symptom score is 17  He is not satisfied with his voiding pattern and is having side effects from Flomax  He does wish to try another medication  I recommended a trial of Uroxatral   Use and side effects were discussed and he will try this medication  We also discussed minimally invasive options  Urolift was described information given  At the conclusion were discussion he was interested in pursuing a urolift evaluation including cystoscopy, transrectal ultrasound and uroflow study  Angiomyolipoma of kidney  He has a history of angiomyolipoma  We will reassess on ultrasound at this time  Elevated PSA  The patient has been followed at [facility] for PSA elevation  He has had a prior biopsy that revealed a 60 g prostate  An MRI was done at [facility] with results not available  We will plan to repeat a PSA at this time  His digital rectal examination is benign    CKD (chronic kidney disease) stage 3, GFR 30-59 ml/min  The patient has chronic kidney disease and has not had a recent renal ultrasound  He has been having some flank pain and is concerned about whether BPH is contributing to his symptoms  We will obtain a renal ultrasound at this time         Diagnoses and all orders for this visit:    BPH with obstruction/lower urinary tract symptoms  -     POCT urine dip auto non-scope  -     alfuzosin (UROXATRAL) 10 mg 24 hr tablet; Take 1 tablet (10 mg total) by mouth daily  -     PSA Total, Diagnostic; Future    Elevated PSA    CKD (chronic kidney disease) stage 3, GFR 30-59 ml/min ([facility])  -     US retroperitoneal complete; Future          Subjective:      Patient ID: Ricky Ramírez is a 48 y o  male  Benign Prostatic Hypertrophy   This is a chronic problem  The current episode started more than 1 year ago  The problem has been gradually worsening since onset  Irritative symptoms include nocturia  Irritative symptoms do not include frequency or urgency  noct x 1  Obstructive symptoms include incomplete emptying and a weak stream  Obstructive symptoms do not include dribbling or an intermittent stream  Pertinent negatives include no chills, dysuria, genital pain, hematuria, hesitancy, nausea or vomiting  AUA score is 8-19  His sexual activity is non-contributory to the current illness  Nothing aggravates the symptoms  Past treatments include tamsulosin  The treatment provided moderate relief  He has been using treatment for 1 to 6 months (flomax is causing side effects)  The following portions of the patient's history were reviewed and updated as appropriate: allergies, current medications, past family history, past medical history, past social history, past surgical history and problem list     Review of Systems   Constitutional: Negative for chills, diaphoresis, fatigue and fever  HENT: Negative  Eyes: Negative  Respiratory: Negative  Cardiovascular: Negative  Gastrointestinal: Negative for nausea and vomiting  Endocrine: Negative  Genitourinary: Positive for incomplete emptying and nocturia  Negative for dysuria, frequency, hematuria, hesitancy and urgency  See HPI   Musculoskeletal: Negative  Skin: Negative  Allergic/Immunologic: Negative  Neurological: Negative  Hematological: Negative  Psychiatric/Behavioral: Negative          AUA SYMPTOM SCORE      Most Recent Value   AUA SYMPTOM SCORE   How often have you had a sensation of not emptying your bladder completely after you finished urinating? 3   How often have you had to urinate again less than two hours after you finished urinating? 3   How often have you found you stopped and started again several times when you urinate? 2   How often have you found it difficult to postpone urination? 3   How often have you had a weak urinary stream?  4   How often have you had to push or strain to begin urination? 1   How many times did you most typically get up to urinate from the time you went to bed at night until the time you got up in the morning? 1   Quality of Life: If you were to spend the rest of your life with your urinary condition just the way it is now, how would you feel about that?  4   AUA SYMPTOM SCORE  17        Objective:      /80 (BP Location: Left arm, Patient Position: Sitting, Cuff Size: Adult)   Pulse 67   Ht 5' 7" (1 702 m)   Wt 92 1 kg (203 lb)   BMI 31 79 kg/m²           Physical Exam   Constitutional: He is oriented to person, place, and time  He appears well-developed and well-nourished  HENT:   Head: Normocephalic and atraumatic  Eyes: Conjunctivae are normal    Neck: Neck supple  Cardiovascular: Normal rate  Pulmonary/Chest: Effort normal    Abdominal: Soft  Bowel sounds are normal  He exhibits no distension and no mass  There is no tenderness  There is no rebound, no guarding and no CVA tenderness  Genitourinary: Rectum normal, testes normal and penis normal  Right testis shows no mass  Left testis shows no mass  No phimosis or hypospadias  Genitourinary Comments: Prostate 2 times enlarged and palpably benign   Musculoskeletal: He exhibits no edema  Neurological: He is alert and oriented to person, place, and time  Skin: Skin is warm and dry  Psychiatric: He has a normal mood and affect   His behavior is normal  Judgment and thought content normal    Vitals reviewed

## 2019-03-22 NOTE — ASSESSMENT & PLAN NOTE
The patient has been followed at [facility] for PSA elevation  He has had a prior biopsy that revealed a 60 g prostate  An MRI was done at [facility] with results not available  We will plan to repeat a PSA at this time    His digital rectal examination is benign

## 2019-03-22 NOTE — PROGRESS NOTES
Daily Note     Today's date: 3/22/2019  Patient name: Rocco Roberts  : 1965  MRN: [id number]  Referring provider: Kristen Cruz MD  Dx:   Encounter Diagnosis     ICD-10-CM    1  Impingement syndrome of left shoulder M75 42    2  Bilateral shoulder pain, unspecified chronicity M25 511     M25 512                   Subjective: Patient reports reducing pain since IE and full compliance with his HEP  Objective: See treatment diary below      Assessment: Patient demonstrating continued teres tightness and scapulothoracic muscle tightness  Able to progress PRE's noted below and patient tolerated well except for impingement with S/L ER  Plan: Continue per plan of care       Precautions: HTN, CKD III, Elevated PSA, Thrombocytopenia    Daily Treatment Diary     Manual  3/14 3/22           L pec release 5' 5'           L lat release 5' 5'           L IR stretching 4x30" 4x30"           L posterior capsule JM Grade IV Grade IV           Teres release  5'               Exercise Diary  3/11 3/14 3/22          Postural correction on UBE             Sleeper stretch 4x30" 4x30" 4x30"          S/L ER 8#  2 sets to fatigue 8#  3 sets to fatigue 5#  3 sets to fatigue          Pec corner stretch 4x30" 4x30"           Prone middle trap 5#  3x10 5#  3 sets to fatigue 5#  3 sets to fatigue          LT wall slides w/ towel 3 sets to fatigue 3 sets to fatigue           90-90 ER w/ BTB 3 sets to fatigue 3 sets to fatigue 3 sets to fatigue          L Lat stretch   3x30"          Standing reaches (snow angels)   x10                                                                                                                                                             Modalities

## 2019-03-24 ENCOUNTER — HOSPITAL ENCOUNTER (OUTPATIENT)
Dept: RADIOLOGY | Facility: HOSPITAL | Age: 54
Discharge: HOME | End: 2019-03-24
Attending: UROLOGY
Payer: COMMERCIAL

## 2019-03-24 DIAGNOSIS — N18.30 CKD (CHRONIC KIDNEY DISEASE) STAGE 3, GFR 30-59 ML/MIN (HCC): ICD-10-CM

## 2019-03-24 PROCEDURE — 76770 US EXAM ABDO BACK WALL COMP: CPT

## 2019-03-25 ENCOUNTER — APPOINTMENT (EMERGENCY)
Dept: RADIOLOGY | Facility: HOSPITAL | Age: 54
End: 2019-03-25
Payer: COMMERCIAL

## 2019-03-25 ENCOUNTER — APPOINTMENT (OUTPATIENT)
Dept: PHYSICAL THERAPY | Facility: REHABILITATION | Age: 54
End: 2019-03-25
Payer: COMMERCIAL

## 2019-03-25 ENCOUNTER — HOSPITAL ENCOUNTER (EMERGENCY)
Facility: HOSPITAL | Age: 54
Discharge: HOME | End: 2019-03-25
Attending: EMERGENCY MEDICINE | Admitting: EMERGENCY MEDICINE
Payer: COMMERCIAL

## 2019-03-25 VITALS
SYSTOLIC BLOOD PRESSURE: 139 MMHG | HEART RATE: 68 BPM | OXYGEN SATURATION: 96 % | BODY MASS INDEX: 31.64 KG/M2 | WEIGHT: 202 LBS | RESPIRATION RATE: 16 BRPM | TEMPERATURE: 98.3 F | DIASTOLIC BLOOD PRESSURE: 75 MMHG

## 2019-03-25 DIAGNOSIS — R10.9 RIGHT FLANK PAIN: Primary | ICD-10-CM

## 2019-03-25 LAB
ALBUMIN SERPL BCP-MCNC: 3.8 G/DL (ref 3.5–5)
ALP SERPL-CCNC: 49 U/L (ref 46–116)
ALT SERPL W P-5'-P-CCNC: 41 U/L (ref 12–78)
ANION GAP SERPL CALCULATED.3IONS-SCNC: 3 MMOL/L (ref 4–13)
AST SERPL W P-5'-P-CCNC: 26 U/L (ref 5–45)
ATRIAL RATE: 59 BPM
BACTERIA UR QL AUTO: NORMAL /HPF
BASOPHILS # BLD AUTO: 0.02 THOUSANDS/ΜL (ref 0–0.1)
BASOPHILS NFR BLD AUTO: 1 % (ref 0–1)
BILIRUB SERPL-MCNC: 0.5 MG/DL (ref 0.2–1)
BILIRUB UR QL STRIP: NEGATIVE
BUN SERPL-MCNC: 16 MG/DL (ref 5–25)
CALCIUM SERPL-MCNC: 8.8 MG/DL (ref 8.3–10.1)
CHLORIDE SERPL-SCNC: 108 MMOL/L (ref 100–108)
CLARITY UR: CLEAR
CO2 SERPL-SCNC: 27 MMOL/L (ref 21–32)
COLOR UR: YELLOW
COLOR, POC: NORMAL
CREAT SERPL-MCNC: 1.41 MG/DL (ref 0.6–1.3)
EOSINOPHIL # BLD AUTO: 0.09 THOUSAND/ΜL (ref 0–0.61)
EOSINOPHIL NFR BLD AUTO: 2 % (ref 0–6)
ERYTHROCYTE [DISTWIDTH] IN BLOOD BY AUTOMATED COUNT: 12.3 % (ref 11.6–15.1)
GFR SERPL CREATININE-BSD FRML MDRD: 56 ML/MIN/1.73SQ M
GLUCOSE SERPL-MCNC: 97 MG/DL (ref 65–140)
GLUCOSE UR STRIP-MCNC: NEGATIVE MG/DL
HCT VFR BLD AUTO: 44.6 % (ref 36.5–49.3)
HGB BLD-MCNC: 15 G/DL (ref 12–17)
HGB UR QL STRIP.AUTO: ABNORMAL
HYALINE CASTS #/AREA URNS LPF: NORMAL /LPF
IMM GRANULOCYTES # BLD AUTO: 0.02 THOUSAND/UL (ref 0–0.2)
IMM GRANULOCYTES NFR BLD AUTO: 1 % (ref 0–2)
KETONES UR STRIP-MCNC: NEGATIVE MG/DL
LEUKOCYTE ESTERASE UR QL STRIP: NEGATIVE
LIPASE SERPL-CCNC: 81 U/L (ref 73–393)
LYMPHOCYTES # BLD AUTO: 0.99 THOUSANDS/ΜL (ref 0.6–4.47)
LYMPHOCYTES NFR BLD AUTO: 24 % (ref 14–44)
MCH RBC QN AUTO: 29.8 PG (ref 26.8–34.3)
MCHC RBC AUTO-ENTMCNC: 33.6 G/DL (ref 31.4–37.4)
MCV RBC AUTO: 89 FL (ref 82–98)
MONOCYTES # BLD AUTO: 0.48 THOUSAND/ΜL (ref 0.17–1.22)
MONOCYTES NFR BLD AUTO: 12 % (ref 4–12)
NEUTROPHILS # BLD AUTO: 2.53 THOUSANDS/ΜL (ref 1.85–7.62)
NEUTS SEG NFR BLD AUTO: 60 % (ref 43–75)
NITRITE UR QL STRIP: NEGATIVE
NON-SQ EPI CELLS URNS QL MICRO: NORMAL /HPF
NRBC BLD AUTO-RTO: 0 /100 WBCS
P AXIS: 53 DEGREES
PH UR STRIP.AUTO: 5.5 [PH] (ref 4.5–8)
PLATELET # BLD AUTO: 154 THOUSANDS/UL (ref 149–390)
PMV BLD AUTO: 11.7 FL (ref 8.9–12.7)
POTASSIUM SERPL-SCNC: 4.1 MMOL/L (ref 3.5–5.3)
PR INTERVAL: 152 MS
PROT SERPL-MCNC: 7.4 G/DL (ref 6.4–8.2)
PROT UR STRIP-MCNC: NEGATIVE MG/DL
QRS AXIS: 250 DEGREES
QRSD INTERVAL: 106 MS
QT INTERVAL: 412 MS
QTC INTERVAL: 407 MS
RBC # BLD AUTO: 5.03 MILLION/UL (ref 3.88–5.62)
RBC #/AREA URNS AUTO: NORMAL /HPF
SODIUM SERPL-SCNC: 138 MMOL/L (ref 136–145)
SP GR UR STRIP.AUTO: 1.02 (ref 1–1.03)
T WAVE AXIS: 23 DEGREES
UROBILINOGEN UR QL STRIP.AUTO: 0.2 E.U./DL
VENTRICULAR RATE: 59 BPM
WBC # BLD AUTO: 4.13 THOUSAND/UL (ref 4.31–10.16)
WBC #/AREA URNS AUTO: NORMAL /HPF

## 2019-03-25 PROCEDURE — 81001 URINALYSIS AUTO W/SCOPE: CPT

## 2019-03-25 PROCEDURE — 74177 CT ABD & PELVIS W/CONTRAST: CPT

## 2019-03-25 PROCEDURE — 83690 ASSAY OF LIPASE: CPT | Performed by: EMERGENCY MEDICINE

## 2019-03-25 PROCEDURE — 36415 COLL VENOUS BLD VENIPUNCTURE: CPT | Performed by: EMERGENCY MEDICINE

## 2019-03-25 PROCEDURE — 85025 COMPLETE CBC W/AUTO DIFF WBC: CPT | Performed by: EMERGENCY MEDICINE

## 2019-03-25 PROCEDURE — 93010 ELECTROCARDIOGRAM REPORT: CPT | Performed by: INTERNAL MEDICINE

## 2019-03-25 PROCEDURE — 96360 HYDRATION IV INFUSION INIT: CPT

## 2019-03-25 PROCEDURE — 99284 EMERGENCY DEPT VISIT MOD MDM: CPT

## 2019-03-25 PROCEDURE — 80053 COMPREHEN METABOLIC PANEL: CPT | Performed by: EMERGENCY MEDICINE

## 2019-03-25 PROCEDURE — 93005 ELECTROCARDIOGRAM TRACING: CPT

## 2019-03-25 RX ADMIN — SODIUM CHLORIDE 1000 ML: 0.9 INJECTION, SOLUTION INTRAVENOUS at 08:54

## 2019-03-25 RX ADMIN — IOHEXOL 100 ML: 350 INJECTION, SOLUTION INTRAVENOUS at 09:27

## 2019-03-25 NOTE — ED ATTENDING ATTESTATION
Karla Cordero DO, saw and evaluated the patient  I have discussed the patient with the resident/non-physician practitioner and agree with the resident's/non-physician practitioner's findings, Plan of Care, and MDM as documented in the resident's/non-physician practitioner's note, except where noted  All available labs and Radiology studies were reviewed  I was present for key portions of any procedure(s) performed by the resident/non-physician practitioner and I was immediately available to provide assistance  At this point I agree with the current assessment done in the Emergency Department  I have conducted an independent evaluation of this patient a history and physical is as follows:    48 yom with right flank pain, worsening for 3-4 days  Unremarkable US yesterday  Past Medical History:   Diagnosis Date    Benign prostatic hyperplasia     Herpes simplex     RESOLVED [id number]    Hypertension     Renal disorder      /80   Pulse 67   Temp 98 3 °F (36 8 °C) (Oral)   Resp 18   Wt 91 6 kg (202 lb)   SpO2 99%   BMI 31 64 kg/m²   NAD, RRR, CTA, Ab soft/NT, no CVA TTP, EXT NROM/no edema     Pain not reproducible  H/o BPH but evaluated by urologist yesterday  ECG, abd labs, CT AP    Evaluation remarkable for acute pathology  CT scan does show some stool in the right side of the abdomen  Will discuss stool softeners  Will not recommend osmotic diuretics due to chronic renal sufficiency  Will refer back to Urology for re-evaluation of microscopic hematuria      Dx   Chronic renal insufficiency  Right flank pain  Microscopic hematuria        Critical Care Time  Procedures

## 2019-03-25 NOTE — ED PROVIDER NOTES
History  Chief Complaint   Patient presents with    Flank Pain     pt reports right flank pain/issues with urinary retention since last week - pt had US completed of kidney yesterday morning as per urologist - pt reports nausea and chills and worsening pain     Right flank pain 4 days  Just saw Urology; had a prostate exam done; showed enlarged prostate had an ultrasound done showed some benign renal cysts likely; but no enlarged bladder  Denies any fevers chills; chest pain shortness of breath  Denies any abdominal pain  States that the pain is worsening despite symptomatic treatment  Is not reproducible there is no skin rash or changes  He denies any hematuria; does have urgency but that is why he was seen by Urology the other day  Has tried different alpha blockers without relief  No diarrhea or constipation  Vital signs unremarkable  Non reproducible right flank pain on exam   Soft abdomen  Prior to Admission Medications   Prescriptions Last Dose Informant Patient Reported? Taking?    Cholecalciferol (VITAMIN D3) 2000 units TABS 3/24/2019 at 0900 Self Yes Yes   Sig: Take 1 tablet by mouth daily     DIOVAN 160 MG tablet 3/25/2019 at 0530  No Yes   Sig: Take 1 tablet daily, brand necessary   Misc Natural Products (TURMERIC CURCUMIN) CAPS 3/24/2019 at 0500 Self Yes Yes   Sig: Take 1 capsule by mouth 2 (two) times a day 2400mg twice daily    alfuzosin (UROXATRAL) 10 mg 24 hr tablet Past Week at Unknown time  No Yes   Sig: Take 1 tablet (10 mg total) by mouth daily   amLODIPine (NORVASC) 10 mg tablet 3/24/2019 at 1500  No Yes   Sig: Take 1 tablet (10 mg total) by mouth daily   triamcinolone (KENALOG) 0 1 % cream Not Taking at Unknown time  Yes No   Sig: APPLY 1-2X A DAY FOR ITCHING X 1-2 WEEKS AND THEN REDUCE TO 3 TIMES A WEEK IF STILL NEEDED      Facility-Administered Medications: None       Past Medical History:   Diagnosis Date    Benign prostatic hyperplasia     Herpes simplex     RESOLVED [id number]    Hypertension     Renal disorder        Past Surgical History:   Procedure Laterality Date    COLONOSCOPY  12/23/2015    [facility]    PROSTATE BIOPSY         Family History   Problem Relation Age of Onset    Other Mother         CABG    Diabetes Mother     Hypertension Mother     Hypertension Father     Kidney cancer Family     Prostate cancer Family     Hypertension Brother     Hypertension Sister     Celiac disease Brother     No Known Problems Son     Eczema Daughter      I have reviewed and agree with the history as documented  Social History     Tobacco Use    Smoking status: Never Smoker    Smokeless tobacco: Never Used   Substance Use Topics    Alcohol use: No    Drug use: No        Review of Systems   Constitutional: Negative for activity change, appetite change, chills and fever  HENT: Negative for congestion, rhinorrhea and sore throat  Eyes: Negative for photophobia and pain  Respiratory: Negative for cough, chest tightness and wheezing  Cardiovascular: Negative for chest pain, palpitations and leg swelling  Gastrointestinal: Negative for abdominal distention, abdominal pain, constipation, diarrhea and nausea  Genitourinary: Positive for flank pain  Negative for decreased urine volume, difficulty urinating, dysuria, frequency and hematuria  Musculoskeletal: Positive for myalgias  Negative for arthralgias, back pain, neck pain and neck stiffness  Skin: Negative for color change, pallor, rash and wound  Neurological: Negative for seizures, speech difficulty, weakness, light-headedness and headaches  Hematological: Negative for adenopathy  Psychiatric/Behavioral: Negative for agitation, confusion, hallucinations and suicidal ideas         Physical Exam  ED Triage Vitals   Temperature Pulse Respirations Blood Pressure SpO2   03/25/19 0642 03/25/19 0640 03/25/19 0640 03/25/19 0640 03/25/19 0640   98 3 °F (36 8 °C) 67 18 147/80 99 %      Temp Source Heart Rate Source Patient Position - Orthostatic VS BP Location FiO2 (%)   03/25/19 0642 03/25/19 0640 03/25/19 0800 03/25/19 0800 --   Oral Monitor Lying Right arm       Pain Score       03/25/19 0640       5             Orthostatic Vital Signs  Vitals:    03/25/19 0640 03/25/19 0800 03/25/19 0845 03/25/19 0931   BP: 147/80 135/72 142/77 139/75   Pulse: 67 62 64 68   Patient Position - Orthostatic VS:  Lying Lying Sitting       Physical Exam   Constitutional: He is oriented to person, place, and time  He appears well-developed and well-nourished  No distress  HENT:   Head: Normocephalic and atraumatic  Mouth/Throat: Oropharynx is clear and moist  No oropharyngeal exudate  Eyes: Pupils are equal, round, and reactive to light  Conjunctivae and EOM are normal  Right eye exhibits no discharge  Left eye exhibits no discharge  Neck: Normal range of motion  Neck supple  No JVD present  No tracheal deviation present  Cardiovascular: Normal rate and normal heart sounds  No murmur heard  Pulmonary/Chest: Effort normal and breath sounds normal  No respiratory distress  He has no wheezes  He has no rales  Abdominal: Soft  Bowel sounds are normal  He exhibits no distension  There is no tenderness  There is no rebound and no guarding  Soft no tenderness throughout   Musculoskeletal: Normal range of motion  He exhibits no edema, tenderness or deformity  Non reproducible right flank pain no signs of trauma rash or ecchymosis   Lymphadenopathy:     He has no cervical adenopathy  Neurological: He is alert and oriented to person, place, and time  No cranial nerve deficit  He exhibits normal muscle tone  Skin: Skin is warm and dry  Capillary refill takes less than 2 seconds  He is not diaphoretic  No erythema  No pallor  Psychiatric: He has a normal mood and affect   His behavior is normal  Judgment and thought content normal        ED Medications  Medications   sodium chloride 0 9 % bolus 1,000 mL (0 mL Intravenous Stopped 3/25/19 1020)   iohexol (OMNIPAQUE) 350 MG/ML injection (MULTI-DOSE) 100 mL (100 mL Intravenous Given 3/25/19 0927)       Diagnostic Studies  Results Reviewed     Procedure Component Value Units Date/Time    Urine Microscopic [[id number]]  (Normal) Collected:  03/25/19 0802    Lab Status:  Final result Specimen:  Urine, Clean Catch Updated:  03/25/19 0906     RBC, UA None Seen /hpf      WBC, UA None Seen /hpf      Epithelial Cells None Seen /hpf      Bacteria, UA None Seen /hpf      Hyaline Casts, UA None Seen /lpf     CBC and differential [[id number]]  (Abnormal) Collected:  03/25/19 0803    Lab Status:  Final result Specimen:  Blood from Arm, Left Updated:  03/25/19 0836     WBC 4 13 Thousand/uL      RBC 5 03 Million/uL      Hemoglobin 15 0 g/dL      Hematocrit 44 6 %      MCV 89 fL      MCH 29 8 pg      MCHC 33 6 g/dL      RDW 12 3 %      MPV 11 7 fL      Platelets 362 Thousands/uL      nRBC 0 /100 WBCs      Neutrophils Relative 60 %      Immat GRANS % 1 %      Lymphocytes Relative 24 %      Monocytes Relative 12 %      Eosinophils Relative 2 %      Basophils Relative 1 %      Neutrophils Absolute 2 53 Thousands/µL      Immature Grans Absolute 0 02 Thousand/uL      Lymphocytes Absolute 0 99 Thousands/µL      Monocytes Absolute 0 48 Thousand/µL      Eosinophils Absolute 0 09 Thousand/µL      Basophils Absolute 0 02 Thousands/µL     Comprehensive metabolic panel [[id number]]  (Abnormal) Collected:  03/25/19 0803    Lab Status:  Final result Specimen:  Blood from Arm, Left Updated:  03/25/19 0831     Sodium 138 mmol/L      Potassium 4 1 mmol/L      Chloride 108 mmol/L      CO2 27 mmol/L      ANION GAP 3 mmol/L      BUN 16 mg/dL      Creatinine 1 41 mg/dL      Glucose 97 mg/dL      Calcium 8 8 mg/dL      AST 26 U/L      ALT 41 U/L      Alkaline Phosphatase 49 U/L      Total Protein 7 4 g/dL      Albumin 3 8 g/dL      Total Bilirubin 0 50 mg/dL      eGFR 56 ml/min/1 73sq m     Narrative: National Kidney Disease Education Program recommendations are as follows:  GFR calculation is accurate only with a steady state creatinine  Chronic Kidney disease less than 60 ml/min/1 73 sq  meters  Kidney failure less than 15 ml/min/1 73 sq  meters  Lipase [[id number]]  (Normal) Collected:  03/25/19 0803    Lab Status:  Final result Specimen:  Blood from Arm, Left Updated:  03/25/19 0831     Lipase 81 u/L     POCT urinalysis dipstick [[id number]]  (Normal) Resulted:  03/25/19 0802    Lab Status:  Final result Updated:  03/25/19 0810     Color, UA see chart    ED Urine Macroscopic [[id number]]  (Abnormal) Collected:  03/25/19 0802    Lab Status:  Final result Specimen:  Urine Updated:  03/25/19 0802     Color, UA Yellow     Clarity, UA Clear     pH, UA 5 5     Leukocytes, UA Negative     Nitrite, UA Negative     Protein, UA Negative mg/dl      Glucose, UA Negative mg/dl      Ketones, UA Negative mg/dl      Urobilinogen, UA 0 2 E U /dl      Bilirubin, UA Negative     Blood, UA Trace     Specific [place], UA 1 020    Narrative:       [facility] RESULT                 CT abdomen pelvis with contrast   Final Result by Светлана Nicolas MD (03/25 1722)      No acute inflammatory process  Left renal cyst is enlarging but remains simple in density  Workstation performed: [id number]               Procedures  Procedures      Phone Consults  ED Phone Contact    ED Course  ED Course as of Mar 26 1638   Mon Mar 25, 2019   0805 Blood, UA(!): Trace   0830  EKG shows sinus rhythm at a rate of 59  Incomplete right bundle-branch block  Nonspecific repolarization changes related to above      0832 SpO2: 99 %   0832 Pulse: 67                               MDM  Number of Diagnoses or Management Options  Right flank pain:   Diagnosis management comments:  Right flank pain  Recently had ultrasound without acute findings  CT scan ordered without acute etiology to explain pain  Patient with a normal urinalysis  Patient without leukocytosis or anemia  Patient without any acute findings on lab work  Hemodynamically stable  Discussed Family physicians/ urology follow-up  Disposition  Final diagnoses:   Right flank pain     Time reflects when diagnosis was documented in both MDM as applicable and the Disposition within this note     Time User Action Codes Description Comment    3/25/2019 10:18 AM Amanda Negro [R10 9] Right flank pain       ED Disposition     ED Disposition Condition Date/Time Comment    Discharge Good Mon Mar 25, 2019 10:18 AM Fabiano Soler discharge to home/self care  Follow-up Information     Follow up With Specialties Details Why Contact Info    Meghan Fields MD Family Medicine Schedule an appointment as soon as possible for a visit in 1 week please make follow-up appointment [address] [facility] [address]  [phone]            Discharge Medication List as of 3/25/2019 10:19 AM      CONTINUE these medications which have NOT CHANGED    Details   alfuzosin (UROXATRAL) 10 mg 24 hr tablet Take 1 tablet (10 mg total) by mouth daily, Starting Fri 3/22/2019, Normal      amLODIPine (NORVASC) 10 mg tablet Take 1 tablet (10 mg total) by mouth daily, Starting Mon 3/18/2019, Normal      Cholecalciferol (VITAMIN D3) 2000 units TABS Take 1 tablet by mouth daily  , Starting Fri 7/14/2017, Historical Med      DIOVAN 160 MG tablet Take 1 tablet daily, brand necessary, Normal      Misc Natural Products (TURMERIC CURCUMIN) CAPS Take 1 capsule by mouth 2 (two) times a day 2400mg twice daily , Starting Sat 8/20/2016, Historical Med      triamcinolone (KENALOG) 0 1 % cream APPLY 1-2X A DAY FOR ITCHING X 1-2 WEEKS AND THEN REDUCE TO 3 TIMES A WEEK IF STILL NEEDED, Historical Med           No discharge procedures on file  ED Provider  Attending physically available and evaluated Fabiano Soler  BEATRICE managed the patient along with the ED Attending      Electronically Signed by         Feliciano Fountain Seb Rosales, DO  03/26/19 1632

## 2019-03-28 ENCOUNTER — OFFICE VISIT (OUTPATIENT)
Dept: PHYSICAL THERAPY | Facility: REHABILITATION | Age: 54
End: 2019-03-28
Payer: COMMERCIAL

## 2019-03-28 DIAGNOSIS — M25.512 BILATERAL SHOULDER PAIN, UNSPECIFIED CHRONICITY: ICD-10-CM

## 2019-03-28 DIAGNOSIS — M75.42 IMPINGEMENT SYNDROME OF LEFT SHOULDER: Primary | ICD-10-CM

## 2019-03-28 DIAGNOSIS — M25.511 BILATERAL SHOULDER PAIN, UNSPECIFIED CHRONICITY: ICD-10-CM

## 2019-03-28 PROCEDURE — 97110 THERAPEUTIC EXERCISES: CPT | Performed by: PHYSICAL THERAPIST

## 2019-03-28 PROCEDURE — 97140 MANUAL THERAPY 1/> REGIONS: CPT | Performed by: PHYSICAL THERAPIST

## 2019-03-28 NOTE — PROGRESS NOTES
Daily Note     Today's date: 3/28/2019  Patient name: Rocco Roberts  : 1965  MRN: [id number]  Referring provider: Kristen Cruz MD  Dx:   Encounter Diagnosis     ICD-10-CM    1  Impingement syndrome of left shoulder M75 42    2  Bilateral shoulder pain, unspecified chronicity M25 511     M25 512                   Subjective: Patient reports compliance with his HEP and notes soreness in the A M       Objective: See treatment diary below      Assessment: Patient continues with teres tightness and was educated in self release  Added supine serratus punches and patient tolerated well  Plan: Continue per plan of care       Precautions: HTN, CKD III, Elevated PSA, Thrombocytopenia    Daily Treatment Diary     Manual  3/14 3/22 3/28          L pec release 5' 5' 5'          L lat release 5' 5' 5'          L IR stretching 4x30" 4x30" 4x30"          L posterior capsule JM Grade IV Grade IV Grade IV          Teres release  5' 5'              Exercise Diary  3/11 3/14 3/22 3/28         Postural correction on UBE             Sleeper stretch 4x30" 4x30" 4x30" 4x30"         S/L ER 8#  2 sets to fatigue 8#  3 sets to fatigue 5#  3 sets to fatigue 5#  3 sets to fatigue         Pec corner stretch 4x30" 4x30"           Prone middle trap 5#  3x10 5#  3 sets to fatigue 5#  3 sets to fatigue          LT wall slides w/ towel 3 sets to fatigue 3 sets to fatigue           90-90 ER w/ BTB 3 sets to fatigue 3 sets to fatigue 3 sets to fatigue 3 sets to fatigue         L Lat stretch   3x30"          Standing reaches (snow angels)   x10 x10         Body blade ER/IR    4x30"         Physioball pec stretch    4x30"         Supine serratus punches    PTB + 5# DB  3 sets to fatigue                                                                                                                     Modalities

## 2019-03-29 ENCOUNTER — TELEPHONE (OUTPATIENT)
Dept: UROLOGY | Facility: MEDICAL CENTER | Age: 54
End: 2019-03-29

## 2019-03-29 NOTE — TELEPHONE ENCOUNTER
----- Message from Chanell Tabor MD sent at 3/28/2019 12:06 PM EDT -----  Please call the patient regarding his abnormal result  Cysts are noted in the left kidney  These are of no concern  The prostate is noted to be enlarged  This is also not a new finding  He can keep his follow-up appointment

## 2019-03-29 NOTE — TELEPHONE ENCOUNTER
Spoke to Pt informing him of his results  Pt states that he went to [facility] ER 3/25 due to Right upper flank pain due to Alfuzsin started on 3/22  Pt has stopped medication and is declining cysto at this time  Message to be directed to Dr Román Morales Jurist

## 2019-03-30 ENCOUNTER — OFFICE VISIT (OUTPATIENT)
Dept: FAMILY MEDICINE CLINIC | Facility: CLINIC | Age: 54
End: 2019-03-30
Payer: COMMERCIAL

## 2019-03-30 VITALS
DIASTOLIC BLOOD PRESSURE: 78 MMHG | RESPIRATION RATE: 20 BRPM | TEMPERATURE: 97.1 F | WEIGHT: 207.2 LBS | HEART RATE: 72 BPM | SYSTOLIC BLOOD PRESSURE: 120 MMHG | HEIGHT: 67 IN | BODY MASS INDEX: 32.52 KG/M2

## 2019-03-30 DIAGNOSIS — R91.1 PULMONARY NODULE: ICD-10-CM

## 2019-03-30 DIAGNOSIS — I10 ESSENTIAL HYPERTENSION: ICD-10-CM

## 2019-03-30 DIAGNOSIS — N13.8 BPH WITH OBSTRUCTION/LOWER URINARY TRACT SYMPTOMS: Primary | ICD-10-CM

## 2019-03-30 DIAGNOSIS — N40.1 BPH WITH OBSTRUCTION/LOWER URINARY TRACT SYMPTOMS: Primary | ICD-10-CM

## 2019-03-30 LAB — PSA SERPL-MCNC: 4.4 NG/ML

## 2019-03-30 PROCEDURE — 99213 OFFICE O/P EST LOW 20 MIN: CPT | Performed by: FAMILY MEDICINE

## 2019-03-30 PROCEDURE — 3078F DIAST BP <80 MM HG: CPT | Performed by: FAMILY MEDICINE

## 2019-03-30 PROCEDURE — 3074F SYST BP LT 130 MM HG: CPT | Performed by: FAMILY MEDICINE

## 2019-03-30 PROCEDURE — 3008F BODY MASS INDEX DOCD: CPT | Performed by: FAMILY MEDICINE

## 2019-03-30 PROCEDURE — 1036F TOBACCO NON-USER: CPT | Performed by: FAMILY MEDICINE

## 2019-04-01 ENCOUNTER — OFFICE VISIT (OUTPATIENT)
Dept: PHYSICAL THERAPY | Facility: REHABILITATION | Age: 54
End: 2019-04-01
Payer: COMMERCIAL

## 2019-04-01 DIAGNOSIS — M25.512 BILATERAL SHOULDER PAIN, UNSPECIFIED CHRONICITY: ICD-10-CM

## 2019-04-01 DIAGNOSIS — M75.42 IMPINGEMENT SYNDROME OF LEFT SHOULDER: Primary | ICD-10-CM

## 2019-04-01 DIAGNOSIS — M25.511 BILATERAL SHOULDER PAIN, UNSPECIFIED CHRONICITY: ICD-10-CM

## 2019-04-01 PROCEDURE — 97112 NEUROMUSCULAR REEDUCATION: CPT | Performed by: PHYSICAL THERAPIST

## 2019-04-01 PROCEDURE — 97140 MANUAL THERAPY 1/> REGIONS: CPT | Performed by: PHYSICAL THERAPIST

## 2019-04-01 PROCEDURE — 97110 THERAPEUTIC EXERCISES: CPT | Performed by: PHYSICAL THERAPIST

## 2019-04-04 ENCOUNTER — TELEPHONE (OUTPATIENT)
Dept: UROLOGY | Facility: MEDICAL CENTER | Age: 54
End: 2019-04-04

## 2019-04-04 ENCOUNTER — OFFICE VISIT (OUTPATIENT)
Dept: PHYSICAL THERAPY | Facility: REHABILITATION | Age: 54
End: 2019-04-04
Payer: COMMERCIAL

## 2019-04-04 DIAGNOSIS — M25.512 BILATERAL SHOULDER PAIN, UNSPECIFIED CHRONICITY: ICD-10-CM

## 2019-04-04 DIAGNOSIS — M75.42 IMPINGEMENT SYNDROME OF LEFT SHOULDER: Primary | ICD-10-CM

## 2019-04-04 DIAGNOSIS — M25.511 BILATERAL SHOULDER PAIN, UNSPECIFIED CHRONICITY: ICD-10-CM

## 2019-04-04 PROCEDURE — 97110 THERAPEUTIC EXERCISES: CPT | Performed by: PHYSICAL THERAPIST

## 2019-04-04 PROCEDURE — 97140 MANUAL THERAPY 1/> REGIONS: CPT | Performed by: PHYSICAL THERAPIST

## 2019-04-15 ENCOUNTER — OFFICE VISIT (OUTPATIENT)
Dept: PHYSICAL THERAPY | Facility: REHABILITATION | Age: 54
End: 2019-04-15
Payer: COMMERCIAL

## 2019-04-15 DIAGNOSIS — M25.512 BILATERAL SHOULDER PAIN, UNSPECIFIED CHRONICITY: ICD-10-CM

## 2019-04-15 DIAGNOSIS — M25.511 BILATERAL SHOULDER PAIN, UNSPECIFIED CHRONICITY: ICD-10-CM

## 2019-04-15 DIAGNOSIS — M75.42 IMPINGEMENT SYNDROME OF LEFT SHOULDER: Primary | ICD-10-CM

## 2019-04-15 PROCEDURE — 97140 MANUAL THERAPY 1/> REGIONS: CPT | Performed by: PHYSICAL THERAPIST

## 2019-04-15 PROCEDURE — 97110 THERAPEUTIC EXERCISES: CPT | Performed by: PHYSICAL THERAPIST

## 2019-04-16 ENCOUNTER — TELEPHONE (OUTPATIENT)
Dept: FAMILY MEDICINE CLINIC | Facility: CLINIC | Age: 54
End: 2019-04-16

## 2019-04-17 DIAGNOSIS — I10 ESSENTIAL HYPERTENSION: ICD-10-CM

## 2019-04-17 RX ORDER — VALSARTAN 160 MG/1
TABLET ORAL
Qty: 30 TABLET | Refills: 3 | Status: SHIPPED | OUTPATIENT
Start: 2019-04-17 | End: 2019-08-07 | Stop reason: SDUPTHER

## 2019-04-19 ENCOUNTER — APPOINTMENT (OUTPATIENT)
Dept: PHYSICAL THERAPY | Facility: REHABILITATION | Age: 54
End: 2019-04-19
Payer: COMMERCIAL

## 2019-04-22 ENCOUNTER — APPOINTMENT (OUTPATIENT)
Dept: PHYSICAL THERAPY | Facility: REHABILITATION | Age: 54
End: 2019-04-22
Payer: COMMERCIAL

## 2019-04-25 ENCOUNTER — TELEPHONE (OUTPATIENT)
Dept: FAMILY MEDICINE CLINIC | Facility: CLINIC | Age: 54
End: 2019-04-25

## 2019-04-26 ENCOUNTER — APPOINTMENT (OUTPATIENT)
Dept: PHYSICAL THERAPY | Facility: REHABILITATION | Age: 54
End: 2019-04-26
Payer: COMMERCIAL

## 2019-04-26 DIAGNOSIS — G47.00 INSOMNIA, UNSPECIFIED TYPE: Primary | ICD-10-CM

## 2019-04-26 RX ORDER — ZOLPIDEM TARTRATE 10 MG/1
TABLET ORAL
Qty: 30 TABLET | Refills: 1 | Status: SHIPPED | OUTPATIENT
Start: 2019-04-26 | End: 2019-05-02

## 2019-04-29 ENCOUNTER — APPOINTMENT (OUTPATIENT)
Dept: PHYSICAL THERAPY | Facility: REHABILITATION | Age: 54
End: 2019-04-29
Payer: COMMERCIAL

## 2019-05-02 ENCOUNTER — APPOINTMENT (OUTPATIENT)
Dept: RADIOLOGY | Facility: OTHER | Age: 54
End: 2019-05-02
Payer: COMMERCIAL

## 2019-05-02 ENCOUNTER — OFFICE VISIT (OUTPATIENT)
Dept: OBGYN CLINIC | Facility: OTHER | Age: 54
End: 2019-05-02
Payer: COMMERCIAL

## 2019-05-02 VITALS
HEIGHT: 67 IN | DIASTOLIC BLOOD PRESSURE: 76 MMHG | BODY MASS INDEX: 33.15 KG/M2 | HEART RATE: 67 BPM | WEIGHT: 211.2 LBS | SYSTOLIC BLOOD PRESSURE: 124 MMHG

## 2019-05-02 DIAGNOSIS — M25.512 LEFT SHOULDER PAIN, UNSPECIFIED CHRONICITY: ICD-10-CM

## 2019-05-02 DIAGNOSIS — M25.512 LEFT SHOULDER PAIN, UNSPECIFIED CHRONICITY: Primary | ICD-10-CM

## 2019-05-02 PROCEDURE — 73030 X-RAY EXAM OF SHOULDER: CPT

## 2019-05-02 PROCEDURE — 99213 OFFICE O/P EST LOW 20 MIN: CPT | Performed by: ORTHOPAEDIC SURGERY

## 2019-05-02 RX ORDER — METHYLPREDNISOLONE 4 MG/1
TABLET ORAL
Qty: 21 TABLET | Refills: 0 | Status: SHIPPED | OUTPATIENT
Start: 2019-05-02 | End: 2019-05-30

## 2019-05-03 ENCOUNTER — APPOINTMENT (OUTPATIENT)
Dept: PHYSICAL THERAPY | Facility: REHABILITATION | Age: 54
End: 2019-05-03
Payer: COMMERCIAL

## 2019-05-06 ENCOUNTER — OFFICE VISIT (OUTPATIENT)
Dept: PHYSICAL THERAPY | Facility: REHABILITATION | Age: 54
End: 2019-05-06
Payer: COMMERCIAL

## 2019-05-06 DIAGNOSIS — M75.42 IMPINGEMENT SYNDROME OF LEFT SHOULDER: Primary | ICD-10-CM

## 2019-05-06 DIAGNOSIS — M25.511 BILATERAL SHOULDER PAIN, UNSPECIFIED CHRONICITY: ICD-10-CM

## 2019-05-06 DIAGNOSIS — M25.512 BILATERAL SHOULDER PAIN, UNSPECIFIED CHRONICITY: ICD-10-CM

## 2019-05-06 PROCEDURE — 97110 THERAPEUTIC EXERCISES: CPT | Performed by: PHYSICAL THERAPIST

## 2019-05-06 PROCEDURE — 97140 MANUAL THERAPY 1/> REGIONS: CPT | Performed by: PHYSICAL THERAPIST

## 2019-05-10 ENCOUNTER — OFFICE VISIT (OUTPATIENT)
Dept: PHYSICAL THERAPY | Facility: REHABILITATION | Age: 54
End: 2019-05-10
Payer: COMMERCIAL

## 2019-05-10 DIAGNOSIS — M25.511 BILATERAL SHOULDER PAIN, UNSPECIFIED CHRONICITY: ICD-10-CM

## 2019-05-10 DIAGNOSIS — M75.42 IMPINGEMENT SYNDROME OF LEFT SHOULDER: Primary | ICD-10-CM

## 2019-05-10 DIAGNOSIS — M25.512 BILATERAL SHOULDER PAIN, UNSPECIFIED CHRONICITY: ICD-10-CM

## 2019-05-10 PROCEDURE — 97110 THERAPEUTIC EXERCISES: CPT | Performed by: PHYSICAL THERAPIST

## 2019-05-10 PROCEDURE — 97140 MANUAL THERAPY 1/> REGIONS: CPT | Performed by: PHYSICAL THERAPIST

## 2019-05-13 ENCOUNTER — OFFICE VISIT (OUTPATIENT)
Dept: PHYSICAL THERAPY | Facility: REHABILITATION | Age: 54
End: 2019-05-13
Payer: COMMERCIAL

## 2019-05-13 DIAGNOSIS — M25.511 BILATERAL SHOULDER PAIN, UNSPECIFIED CHRONICITY: ICD-10-CM

## 2019-05-13 DIAGNOSIS — M25.512 BILATERAL SHOULDER PAIN, UNSPECIFIED CHRONICITY: ICD-10-CM

## 2019-05-13 DIAGNOSIS — M75.42 IMPINGEMENT SYNDROME OF LEFT SHOULDER: Primary | ICD-10-CM

## 2019-05-13 PROCEDURE — 97140 MANUAL THERAPY 1/> REGIONS: CPT | Performed by: PHYSICAL THERAPIST

## 2019-05-17 ENCOUNTER — APPOINTMENT (OUTPATIENT)
Dept: PHYSICAL THERAPY | Facility: REHABILITATION | Age: 54
End: 2019-05-17
Payer: COMMERCIAL

## 2019-05-30 ENCOUNTER — OFFICE VISIT (OUTPATIENT)
Dept: OBGYN CLINIC | Facility: OTHER | Age: 54
End: 2019-05-30
Payer: COMMERCIAL

## 2019-05-30 VITALS
WEIGHT: 207 LBS | BODY MASS INDEX: 32.49 KG/M2 | DIASTOLIC BLOOD PRESSURE: 74 MMHG | HEIGHT: 67 IN | HEART RATE: 72 BPM | SYSTOLIC BLOOD PRESSURE: 124 MMHG

## 2019-05-30 DIAGNOSIS — M75.42 IMPINGEMENT SYNDROME OF LEFT SHOULDER: ICD-10-CM

## 2019-05-30 DIAGNOSIS — M75.41 IMPINGEMENT SYNDROME OF RIGHT SHOULDER: Primary | ICD-10-CM

## 2019-05-30 PROCEDURE — 99213 OFFICE O/P EST LOW 20 MIN: CPT | Performed by: ORTHOPAEDIC SURGERY

## 2019-05-30 PROCEDURE — 20610 DRAIN/INJ JOINT/BURSA W/O US: CPT | Performed by: ORTHOPAEDIC SURGERY

## 2019-05-30 RX ORDER — BETAMETHASONE SODIUM PHOSPHATE AND BETAMETHASONE ACETATE 3; 3 MG/ML; MG/ML
6 INJECTION, SUSPENSION INTRA-ARTICULAR; INTRALESIONAL; INTRAMUSCULAR; SOFT TISSUE
Status: COMPLETED | OUTPATIENT
Start: 2019-05-30 | End: 2019-05-30

## 2019-05-30 RX ORDER — BUPIVACAINE HYDROCHLORIDE 2.5 MG/ML
2 INJECTION, SOLUTION INFILTRATION; PERINEURAL
Status: COMPLETED | OUTPATIENT
Start: 2019-05-30 | End: 2019-05-30

## 2019-05-30 RX ADMIN — BETAMETHASONE SODIUM PHOSPHATE AND BETAMETHASONE ACETATE 6 MG: 3; 3 INJECTION, SUSPENSION INTRA-ARTICULAR; INTRALESIONAL; INTRAMUSCULAR; SOFT TISSUE at 10:00

## 2019-05-30 RX ADMIN — BUPIVACAINE HYDROCHLORIDE 2 ML: 2.5 INJECTION, SOLUTION INFILTRATION; PERINEURAL at 10:00

## 2019-06-07 ENCOUNTER — OFFICE VISIT (OUTPATIENT)
Dept: PHYSICAL THERAPY | Facility: REHABILITATION | Age: 54
End: 2019-06-07
Payer: COMMERCIAL

## 2019-06-07 DIAGNOSIS — M25.511 BILATERAL SHOULDER PAIN, UNSPECIFIED CHRONICITY: ICD-10-CM

## 2019-06-07 DIAGNOSIS — M25.512 BILATERAL SHOULDER PAIN, UNSPECIFIED CHRONICITY: ICD-10-CM

## 2019-06-07 DIAGNOSIS — M75.42 IMPINGEMENT SYNDROME OF LEFT SHOULDER: Primary | ICD-10-CM

## 2019-06-07 PROCEDURE — 97110 THERAPEUTIC EXERCISES: CPT | Performed by: PHYSICAL THERAPIST

## 2019-06-07 PROCEDURE — 97140 MANUAL THERAPY 1/> REGIONS: CPT | Performed by: PHYSICAL THERAPIST

## 2019-06-10 ENCOUNTER — OFFICE VISIT (OUTPATIENT)
Dept: PHYSICAL THERAPY | Facility: REHABILITATION | Age: 54
End: 2019-06-10
Payer: COMMERCIAL

## 2019-06-10 DIAGNOSIS — M75.42 IMPINGEMENT SYNDROME OF LEFT SHOULDER: Primary | ICD-10-CM

## 2019-06-10 DIAGNOSIS — M25.511 BILATERAL SHOULDER PAIN, UNSPECIFIED CHRONICITY: ICD-10-CM

## 2019-06-10 DIAGNOSIS — M25.512 BILATERAL SHOULDER PAIN, UNSPECIFIED CHRONICITY: ICD-10-CM

## 2019-06-10 PROCEDURE — 97110 THERAPEUTIC EXERCISES: CPT | Performed by: PHYSICAL THERAPIST

## 2019-06-10 PROCEDURE — 97140 MANUAL THERAPY 1/> REGIONS: CPT | Performed by: PHYSICAL THERAPIST

## 2019-06-13 ENCOUNTER — OFFICE VISIT (OUTPATIENT)
Dept: PHYSICAL THERAPY | Facility: REHABILITATION | Age: 54
End: 2019-06-13
Payer: COMMERCIAL

## 2019-06-13 DIAGNOSIS — M25.511 BILATERAL SHOULDER PAIN, UNSPECIFIED CHRONICITY: ICD-10-CM

## 2019-06-13 DIAGNOSIS — M75.42 IMPINGEMENT SYNDROME OF LEFT SHOULDER: Primary | ICD-10-CM

## 2019-06-13 DIAGNOSIS — M25.512 BILATERAL SHOULDER PAIN, UNSPECIFIED CHRONICITY: ICD-10-CM

## 2019-06-13 PROCEDURE — 97112 NEUROMUSCULAR REEDUCATION: CPT | Performed by: PHYSICAL THERAPIST

## 2019-06-13 PROCEDURE — 97140 MANUAL THERAPY 1/> REGIONS: CPT | Performed by: PHYSICAL THERAPIST

## 2019-06-13 PROCEDURE — 97110 THERAPEUTIC EXERCISES: CPT | Performed by: PHYSICAL THERAPIST

## 2019-06-14 ENCOUNTER — TELEPHONE (OUTPATIENT)
Dept: UROLOGY | Facility: MEDICAL CENTER | Age: 54
End: 2019-06-14

## 2019-06-17 ENCOUNTER — OFFICE VISIT (OUTPATIENT)
Dept: PHYSICAL THERAPY | Facility: REHABILITATION | Age: 54
End: 2019-06-17
Payer: COMMERCIAL

## 2019-06-17 DIAGNOSIS — M25.511 BILATERAL SHOULDER PAIN, UNSPECIFIED CHRONICITY: ICD-10-CM

## 2019-06-17 DIAGNOSIS — M75.42 IMPINGEMENT SYNDROME OF LEFT SHOULDER: Primary | ICD-10-CM

## 2019-06-17 DIAGNOSIS — M25.512 BILATERAL SHOULDER PAIN, UNSPECIFIED CHRONICITY: ICD-10-CM

## 2019-06-17 PROCEDURE — 97140 MANUAL THERAPY 1/> REGIONS: CPT

## 2019-06-17 PROCEDURE — 97110 THERAPEUTIC EXERCISES: CPT

## 2019-06-17 PROCEDURE — 97112 NEUROMUSCULAR REEDUCATION: CPT

## 2019-06-24 ENCOUNTER — OFFICE VISIT (OUTPATIENT)
Dept: PHYSICAL THERAPY | Facility: REHABILITATION | Age: 54
End: 2019-06-24
Payer: COMMERCIAL

## 2019-06-24 DIAGNOSIS — M25.512 BILATERAL SHOULDER PAIN, UNSPECIFIED CHRONICITY: ICD-10-CM

## 2019-06-24 DIAGNOSIS — M75.42 IMPINGEMENT SYNDROME OF LEFT SHOULDER: Primary | ICD-10-CM

## 2019-06-24 DIAGNOSIS — M25.511 BILATERAL SHOULDER PAIN, UNSPECIFIED CHRONICITY: ICD-10-CM

## 2019-06-24 PROCEDURE — 97110 THERAPEUTIC EXERCISES: CPT

## 2019-06-24 PROCEDURE — 97112 NEUROMUSCULAR REEDUCATION: CPT

## 2019-06-24 PROCEDURE — 97140 MANUAL THERAPY 1/> REGIONS: CPT

## 2019-06-28 ENCOUNTER — APPOINTMENT (OUTPATIENT)
Dept: PHYSICAL THERAPY | Facility: REHABILITATION | Age: 54
End: 2019-06-28
Payer: COMMERCIAL

## 2019-07-01 ENCOUNTER — OFFICE VISIT (OUTPATIENT)
Dept: OBGYN CLINIC | Facility: CLINIC | Age: 54
End: 2019-07-01
Payer: COMMERCIAL

## 2019-07-01 VITALS
HEIGHT: 67 IN | DIASTOLIC BLOOD PRESSURE: 74 MMHG | SYSTOLIC BLOOD PRESSURE: 124 MMHG | WEIGHT: 207.4 LBS | BODY MASS INDEX: 32.55 KG/M2

## 2019-07-01 DIAGNOSIS — M77.8 TRICEPS TENDONITIS: Primary | ICD-10-CM

## 2019-07-01 PROCEDURE — 20551 NJX 1 TENDON ORIGIN/INSJ: CPT | Performed by: ORTHOPAEDIC SURGERY

## 2019-07-01 PROCEDURE — 99213 OFFICE O/P EST LOW 20 MIN: CPT | Performed by: ORTHOPAEDIC SURGERY

## 2019-07-01 RX ORDER — LIDOCAINE HYDROCHLORIDE 10 MG/ML
1 INJECTION, SOLUTION INFILTRATION; PERINEURAL
Status: COMPLETED | OUTPATIENT
Start: 2019-07-01 | End: 2019-07-01

## 2019-07-01 RX ORDER — BETAMETHASONE SODIUM PHOSPHATE AND BETAMETHASONE ACETATE 3; 3 MG/ML; MG/ML
6 INJECTION, SUSPENSION INTRA-ARTICULAR; INTRALESIONAL; INTRAMUSCULAR; SOFT TISSUE
Status: COMPLETED | OUTPATIENT
Start: 2019-07-01 | End: 2019-07-01

## 2019-07-01 RX ADMIN — BETAMETHASONE SODIUM PHOSPHATE AND BETAMETHASONE ACETATE 6 MG: 3; 3 INJECTION, SUSPENSION INTRA-ARTICULAR; INTRALESIONAL; INTRAMUSCULAR; SOFT TISSUE at 17:02

## 2019-07-01 RX ADMIN — LIDOCAINE HYDROCHLORIDE 1 ML: 10 INJECTION, SOLUTION INFILTRATION; PERINEURAL at 17:02

## 2019-07-01 NOTE — PROGRESS NOTES
ASSESSMENT/PLAN:    Assessment:    right medial epicondylitis    Plan:    corticosteroid injection is indicated for pain relief purposes  It was advised, accepted, and administered as outlined below  To Do Next Visit:  Re-evaluate symptoms    General Discussions:     Medial Epicondylitis: The anatomy and physiology of medial epicondylitis was discussed with the patient today  Typically, one traumatic injury, or repetitive use may cause a partial tear of the flexor pronator mass  This creates pain over the medial epicondyle  This pain typically is made worse with palm up lifting activities as well as anything that involves strength and stability of the wrist   The pain may radiate from the wrist up to the elbow  At times, the shoulder may be weak as well which can predispose or cause continuation of the problem  Conservative treatment usually cures a vast majority of patients; however, this may take up to 6-9 months  Conservative treatment options typically include activity modification, therapy for strengthening of the shoulder and elbow, nocturnal wrist support splints, and possible corticosteroid injections  Corticosteroid injections do not change the natural history of this process, but may decrease the pain temporarily  Surgery is required in fewer than 5% of patients  At times, the ulnar nerve may be aggravated with this condition  Operative Discussions:         _____________________________________________________  CHIEF COMPLAINT:  Chief Complaint   Patient presents with    Right Elbow - Follow-up       SUBJECTIVE:  Geryl Meigs is a 48 y o  male who presents  Follow-up regarding right elbow medial epicondylitis  He received corticosteroid injections in the past with pain relief  He has recently underwent sub acromial corticosteroid injections as well as physical therapy  Denies any numbness or tingling    Pain is worse with activity and is relieved with rest   Pain is not associated with any numbness or tingling  Pain does not radiate  PAST MEDICAL HISTORY:  Past Medical History:   Diagnosis Date    Benign prostatic hyperplasia     Herpes simplex     RESOLVED [id number]    Hypertension     Renal disorder        PAST SURGICAL HISTORY:  Past Surgical History:   Procedure Laterality Date    COLONOSCOPY  12/23/2015     [facility]    PROSTATE BIOPSY         FAMILY HISTORY:  Family History   Problem Relation Age of Onset    Other Mother         CABG    Diabetes Mother     Hypertension Mother     Hypertension Father     Kidney cancer Family     Prostate cancer Family     Hypertension Brother     Hypertension Sister     Celiac disease Brother     No Known Problems Son     Eczema Daughter        SOCIAL HISTORY:  Social History     Tobacco Use    Smoking status: Never Smoker    Smokeless tobacco: Never Used   Substance Use Topics    Alcohol use: No    Drug use: No       MEDICATIONS:    Current Outpatient Medications:     amLODIPine (NORVASC) 10 mg tablet, Take 1 tablet (10 mg total) by mouth daily, Disp: 90 tablet, Rfl: 1    Cholecalciferol (VITAMIN D3) 2000 units TABS, Take 1 tablet by mouth daily  , Disp: , Rfl:     DIOVAN 160 MG tablet, take 1 tablet by mouth daily (BRAND NECESSARY), Disp: 30 tablet, Rfl: 3    Misc Natural Products (TURMERIC CURCUMIN) CAPS, Take 1 capsule by mouth 2 (two) times a day 2400mg twice daily , Disp: , Rfl:     triamcinolone (KENALOG) 0 1 % cream, APPLY 1-2X A DAY FOR ITCHING X 1-2 WEEKS AND THEN REDUCE TO 3 TIMES A WEEK IF STILL NEEDED, Disp: , Rfl: 1    ALLERGIES:  Allergies   Allergen Reactions    Moxifloxacin Itching and Edema     Category: Allergy;     Lisinopril Rash    Valsartan Rash       REVIEW OF SYSTEMS:  Pertinent items are noted in HPI  A comprehensive review of systems was negative      LABS:  HgA1c:   Lab Results   Component Value Date    HGBA1C 5 0 04/28/2018     BMP:   Lab Results   Component Value Date    GLUCOSE 97 01/20/2014 CALCIUM 8 8 03/25/2019     12/21/2017    K 4 1 03/25/2019    CO2 27 03/25/2019     03/25/2019    BUN 16 03/25/2019    CREATININE 1 41 (H) 03/25/2019       _____________________________________________________  PHYSICAL EXAMINATION:  Vital signs: /74   Ht 5' 7" (1 702 m)   Wt 94 1 kg (207 lb 6 4 oz)   BMI 32 48 kg/m²   General: well developed and well nourished, alert, oriented times 3 and appears comfortable  Psychiatric: Normal  HEENT: Trachea Midline, No torticollis  Cardiovascular: No discernable arrhythmia  Pulmonary: No wheezing or stridor  Skin: No masses, erthema, lacerations, fluctation, ulcerations  Neurovascular: Sensation Intact to the Median, Ulnar, Radial Nerve, Motor Intact to the Median, Ulnar, Radial Nerve and Pulses Intact    MUSCULOSKELETAL EXAMINATION:  Extremities:    Tenderness to palpation just proximal to the medial epicondyle at level of intermuscular septum  He does have subluxation of ulnar nerve with elbow ROM    No significant pain with resisted flexion And pronation at the wrist       _____________________________________________________  STUDIES REVIEWED:  No Studies to review    PROCEDURES PERFORMED:  Hand/upper extremity injection: R elbow  Date/Time: 7/1/2019 5:02 PM  Consent given by: patient  Site marked: site marked  Timeout: Immediately prior to procedure a time out was called to verify the correct patient, procedure, equipment, support staff and site/side marked as required   Supporting Documentation  Indications: pain   Procedure Details  Condition:medial epicondylitis Site: R elbow   Needle size: 25 G  Ultrasound guidance: no  Approach: medial  Medications administered: 1 mL lidocaine 1 %; 6 mg betamethasone acetate-betamethasone sodium phosphate 6 (3-3) mg/mL    Patient tolerance: patient tolerated the procedure well with no immediate complications  Dressing:  Sterile dressing applied               I interviewed, took the history and examined the patient  I discuss the case with the resident and reviewed the resident's note  I supervised the resident and I agree with the resident management plan as it was presented to me  I was present in the clinic and examined the patient

## 2019-07-02 ENCOUNTER — OFFICE VISIT (OUTPATIENT)
Dept: PHYSICAL THERAPY | Facility: REHABILITATION | Age: 54
End: 2019-07-02
Payer: COMMERCIAL

## 2019-07-02 DIAGNOSIS — M25.512 BILATERAL SHOULDER PAIN, UNSPECIFIED CHRONICITY: ICD-10-CM

## 2019-07-02 DIAGNOSIS — M77.8 TRICEPS TENDINITIS: ICD-10-CM

## 2019-07-02 DIAGNOSIS — M75.42 IMPINGEMENT SYNDROME OF LEFT SHOULDER: Primary | ICD-10-CM

## 2019-07-02 DIAGNOSIS — M25.511 BILATERAL SHOULDER PAIN, UNSPECIFIED CHRONICITY: ICD-10-CM

## 2019-07-02 DIAGNOSIS — M77.01 MEDIAL EPICONDYLITIS OF ELBOW, RIGHT: ICD-10-CM

## 2019-07-02 PROCEDURE — 97110 THERAPEUTIC EXERCISES: CPT | Performed by: PHYSICAL THERAPIST

## 2019-07-02 PROCEDURE — 97140 MANUAL THERAPY 1/> REGIONS: CPT | Performed by: PHYSICAL THERAPIST

## 2019-07-02 PROCEDURE — 97112 NEUROMUSCULAR REEDUCATION: CPT | Performed by: PHYSICAL THERAPIST

## 2019-07-02 NOTE — PROGRESS NOTES
PT Re-Evaluation     Today's date: 2019  Patient name: Ann Brukett  : 1965  MRN: [id number]  Referring provider: Linnette Singh MD  Dx:   Encounter Diagnosis     ICD-10-CM    1  Impingement syndrome of left shoulder M75 42    2  Bilateral shoulder pain, unspecified chronicity M25 511     M25 512    3  Triceps tendinitis M77 9    4  Medial epicondylitis of elbow, right M77 01        Start Time: 0700  Stop Time: 0750  Total time in clinic (min): 50 minutes    Assessment  Assessment details: Patient is a 48y o  year old male who attended physical therapy for 17 treatment sessions regarding left shoulder pain and presents with an additional script regarding right elbow pain  Patient reports 50% improvement at this time which correlates to improved FOTO scoring  Patient has shown improvement throughout PT by demonstrating decreased pain, increased range of motion, increased strength and improved tolerance to activity  Patient continues to present with pain, decreased ROM, decreased strength, and decreased tolerance to activity  Jose Deal would benefit from continued physical therapy to address these issues and to maximize function  Thank you  Impairments: abnormal or restricted ROM, abnormal movement, activity intolerance, impaired physical strength, pain with function and scapular dyskinesis  Understanding of Dx/Px/POC: excellent   Prognosis: good    Goals  STG (4 weeks)  1  Patient will be independent with HEP = MET  2  Decrease pain at worst by 2 points on NPRS = PARTIALLY MET  3  Increase left shoulder pain free abduction from 110 degrees to > 160 degrees = PROGRESSING  4  Patient will demonstrate ability to perform bench press without symptom provocation = PARTIALLY MET, now elbow pain present  LTG (8 weeks)  1  Decrease pain at worst from 4 points on NPRS = NOT MET  2  Patient will demonstrate full left shoulder pain free AROM in all planes = PROGRESSING  3   Increase left shoulder IR PROM = right shoulder IR PROM = PROGRESSING  4  Increase FOTO > or equal to expected outcome = PROGRESSING    Plan  Patient would benefit from: skilled PT  Planned therapy interventions: joint mobilization, manual therapy, neuromuscular re-education, patient education, postural training, strengthening, stretching, therapeutic exercise, home exercise program and ADL training  Frequency: 2x week  Duration in weeks: 8  Treatment plan discussed with: patient        Subjective Evaluation    History of Present Illness  Mechanism of injury: Patient reports that he has been experiencing left shoulder discomfort with overhead lifting or sleeping on that side  Patient states that it feels similar to his right shoulder impingement he had in the past   Patient denies neck involvement or radiating symptoms into his UE  Patient works primarily performing desk work and demonstrates poor static posture with rounding shoulders  Patients goals for PT are to reduce pain with shoulder movement and return to PLOF  5/10/2019: Patient reports that his shoulder pain remains less following PT session but notes continued discomfort with shoulder press and bench press  Patient states that he just ended a steroid dose pack and will be getting a subacromial injection  2019: Patient reports compliance with his HEP and states that he notices a 25% reduction in overall pain  Patient also noted improvement in sleep quality over the last 2 weeks  Patient presents with additional script regarding right elbow pain/discomfort noted mostly with bench press     Pain  Current pain ratin  At best pain ratin  At worst pain ratin  Quality: dull ache  Aggravating factors: overhead activity and lifting  Progression: worsening    Hand dominance: right      Diagnostic Tests  No diagnostic tests performed  Treatments  No previous or current treatments  Current treatment: massage  Patient Goals  Patient goals for therapy: increased strength, return to sport/leisure activities, decreased pain and improved balance          Objective     Postural Observations  Seated posture: fair  Standing posture: fair        Cervical/Thoracic Screen   Cervical range of motion within normal limits  Thoracic range of motion within normal limits with the following exceptions: (+) hypomobility T1-T4 as per [facility] spring testing (REMAINS)    Neurological Testing     Sensation     Shoulder   Left Shoulder   Intact: light touch    Right Shoulder   Intact: light touch    Reflexes   Left   Biceps (C5/C6): normal (2+)  Brachioradialis (C6): normal (2+)  Laguerre's reflex: negative    Right   Biceps (C5/C6): normal (2+)  Brachioradialis (C6): normal (2+)  Laguerre's reflex: negative    Active Range of Motion   Left Shoulder   Flexion: WFL and with pain  Abduction: WFL and with pain    Right Shoulder   Flexion: WFL  Abduction: WFL    Passive Range of Motion   Left Shoulder   Internal rotation 90°: 46 degrees     Right Shoulder   Internal rotation 90°: 45 degrees     Scapular Mobility   Left Shoulder   Scapular mobility: good  Scapular Mobility with Shoulder to 90° FF   Scapular winging: minimal    Scapular Mobility beyond 90° FF   Scapular winging: minimal  Scapular elevation: moderate  Upward rotation: excessive    Strength/Myotome Testing     Left Shoulder     Planes of Motion   Flexion: 4+   Abduction: 4+   External rotation at 90°: 4+   Internal rotation at 90°: 4+     Right Shoulder     Planes of Motion   Flexion: 4+   Abduction: 4+   External rotation at 90°: 4+   Internal rotation at 90°: 4+     Additional Strength Details  Hand Held dynamometry:  R = 130# ! pain  L = 128 #    Tests     Left Shoulder   Positive full can, Hawkin's, Neer's, painful arc and Speed's  Negative apprehension, belly press, crank and drop arm  Right Elbow   Positive handshake, passive medial epicondylitis and Tinel's sign (cubital tunnel)     Negative valgus stress at 0 degrees and valgus stress at 30 degrees  Additional Tests Details  Resting scap position (inferior angle to T7)  R = 14 0 cm  L = 16 0 cm (lateral and elevated shift - UT dominant)             Precautions: HTN, CKD III, Elevated PSA, Thrombocytopenia    Daily Treatment Diary     Manual  6/10 6/13 6/17 6/24 7/2     6/7   L pec release 5' 5' B/L  3' ea B/l 3' ea B/L  3' ea  5'   L lat release 3' 3' B/L  3' ea B/l 3; ea B/L  3' ea       3'   L IR stretching 4x30" 4x30" 4x30" 4x30" 4x30"     4x30"   L posterior capsule JM             Teres release 7'/7' 7'/7' B/L  3' ea B/l  3'  B/L 3' ea      7'/7'   Re-assessment     15'        Radiohumeral distraction     Grade IV        Ulnar n  glides     2x10        R laser to medial epicondyle     NV            Exercise Diary  6/7 6/10 6/13 6/17 6/24 7/2    5/13   Postural correction on UBE     3'/3'   lvl 4 3'/3'  L4       Sleeper stretch             S/L ER             Pec corner stretch             Prone middle trap             LT wall slides w/ towel             90-90 ER w/ BTB             L Lat stretch             Standing reaches (snow angels)             Body blade ER/IR             Physioball pec stretch             Supine serratus punches             Scapular retractions             1/2 foam roll protocol             Lower trap lifts 3x10 3x10        3x10   Posterior capsule stretch 4x30" 4x30"        4x30"   LT wall slide   2x10 2x10 2x10 2x10    3x10   LT holds 2x10x5" 2x10 x5"        2x10x5"   LT depression w/ big PTB at redcord x2 fatigue x2 fatigue x2 fatigue x2 fatigue  x2 fatigue x2 fatigue    x2 fatigue   Foam roll protocol  1x5' 1x5' 1x 5' 1x 5 1x5'       [place] scap depression + retraction   2x10 Forward lean  10x defered 2x 2x10       Right flexor digitorum stretch      NV           Modalities

## 2019-07-05 ENCOUNTER — OFFICE VISIT (OUTPATIENT)
Dept: PHYSICAL THERAPY | Facility: REHABILITATION | Age: 54
End: 2019-07-05
Payer: COMMERCIAL

## 2019-07-05 DIAGNOSIS — M77.01 MEDIAL EPICONDYLITIS OF ELBOW, RIGHT: ICD-10-CM

## 2019-07-05 DIAGNOSIS — M75.42 IMPINGEMENT SYNDROME OF LEFT SHOULDER: Primary | ICD-10-CM

## 2019-07-05 DIAGNOSIS — M25.511 BILATERAL SHOULDER PAIN, UNSPECIFIED CHRONICITY: ICD-10-CM

## 2019-07-05 DIAGNOSIS — M77.8 TRICEPS TENDINITIS: ICD-10-CM

## 2019-07-05 DIAGNOSIS — M25.512 BILATERAL SHOULDER PAIN, UNSPECIFIED CHRONICITY: ICD-10-CM

## 2019-07-05 PROCEDURE — 97140 MANUAL THERAPY 1/> REGIONS: CPT | Performed by: PHYSICAL THERAPIST

## 2019-07-05 PROCEDURE — 97112 NEUROMUSCULAR REEDUCATION: CPT | Performed by: PHYSICAL THERAPIST

## 2019-07-05 PROCEDURE — 97110 THERAPEUTIC EXERCISES: CPT | Performed by: PHYSICAL THERAPIST

## 2019-07-05 NOTE — PROGRESS NOTES
Daily Note     Today's date: 2019  Patient name: Kirsten Merlin  : 1965  MRN: [id number]  Referring provider: Gabriella Mathew MD  Dx:   Encounter Diagnosis     ICD-10-CM    1  Impingement syndrome of left shoulder M75 42    2  Bilateral shoulder pain, unspecified chronicity M25 511     M25 512    3  Triceps tendinitis M77 9    4  Medial epicondylitis of elbow, right M77 01                   Subjective: Patient reports compliance with his HEP and continued subtle reduction of symptoms  Objective: See treatment diary below      Assessment: Patient demonstrating improving subscap length as per testing  Good tolerance and pain reduction noted following radiohumeral manuals  Hypomobility noted in the CTJ as per continued 1/2 foam roll protocol  Plan: Continue per plan of care  Precautions: HTN, CKD III, Elevated PSA, Thrombocytopenia    Daily Treatment Diary     Manual  6/10 6/13 6/17 6/24 7/2 7/5    6/7   L pec release 5' 5' B/L  3' ea B/l 3' ea B/L  3' ea  B/L 3' ea  5'   L lat release 3' 3' B/L  3' ea B/l 3; ea B/L  3' ea  B/L 3' ea  3'   L IR stretching 4x30" 4x30" 4x30" 4x30" 4x30" 4x30"    4x30"   L posterior capsule JM             Teres release 7'/7' 7'/7' B/L  3' ea B/l  3'  B/L 3' ea   B/L 3' ea     7'/7'   Re-assessment     15'        R Radiohumeral distraction     Grade IV Grade IV       Ulnar n  glides     2x10 2x10       R laser to medial epicondyle     NV procotol 6'36"             Exercise Diary  6/7 6/10 6/13 6/17 6/24 7/2 7/5   5/13   Postural correction on UBE     3'/3'   lvl 4 3'/3'  L4 3'/3'  L4      Sleeper stretch             S/L ER             Pec corner stretch             Prone middle trap             LT wall slides w/ towel             90-90 ER w/ BTB             L Lat stretch             Standing reaches (snow angels)             Body blade ER/IR             Physioball pec stretch             Supine serratus punches             Scapular retractions 1/2 foam roll protocol             Lower trap lifts 3x10 3x10        3x10   Posterior capsule stretch 4x30" 4x30"        4x30"   LT wall slide   2x10 2x10 2x10 2x10    3x10   LT holds 2x10x5" 2x10 x5"        2x10x5"   LT depression w/ big PTB at redcord x2 fatigue x2 fatigue x2 fatigue x2 fatigue  x2 fatigue x2 fatigue x3 fatigue (P&B)   x2 fatigue   Foam roll protocol  1x5' 1x5' 1x 5' 1x 5 1x5' 1x5'      [place] scap depression + retraction   2x10 Forward lean  10x defered 2x 2x10 2x10      Right flexor digitorum stretch      NV           Modalities

## 2019-07-16 ENCOUNTER — OFFICE VISIT (OUTPATIENT)
Dept: PHYSICAL THERAPY | Facility: REHABILITATION | Age: 54
End: 2019-07-16
Payer: COMMERCIAL

## 2019-07-16 DIAGNOSIS — M25.512 BILATERAL SHOULDER PAIN, UNSPECIFIED CHRONICITY: ICD-10-CM

## 2019-07-16 DIAGNOSIS — M25.511 BILATERAL SHOULDER PAIN, UNSPECIFIED CHRONICITY: ICD-10-CM

## 2019-07-16 DIAGNOSIS — M77.01 MEDIAL EPICONDYLITIS OF ELBOW, RIGHT: ICD-10-CM

## 2019-07-16 DIAGNOSIS — M77.8 TRICEPS TENDINITIS: ICD-10-CM

## 2019-07-16 DIAGNOSIS — M75.42 IMPINGEMENT SYNDROME OF LEFT SHOULDER: Primary | ICD-10-CM

## 2019-07-16 PROCEDURE — 97110 THERAPEUTIC EXERCISES: CPT | Performed by: PHYSICAL THERAPIST

## 2019-07-16 PROCEDURE — 97140 MANUAL THERAPY 1/> REGIONS: CPT | Performed by: PHYSICAL THERAPIST

## 2019-07-16 NOTE — PROGRESS NOTES
Daily Note     Today's date: 2019  Patient name: Dorene Gonzales  : 1965  MRN: [id number]  Referring provider: Paris Reno MD  Dx:   Encounter Diagnosis     ICD-10-CM    1  Impingement syndrome of left shoulder M75 42    2  Bilateral shoulder pain, unspecified chronicity M25 511     M25 512    3  Triceps tendinitis M77 9    4  Medial epicondylitis of elbow, right M77 01                   Subjective: Patient reports a worsening of B/L shoulder pain over the last 2 weeks and notes this is secondary to not having PT the the last 2 weeks  Patient reports no recent change in activities  Objective: See treatment diary below      Assessment: Patient requested laser treatment for B/L shoulder pain and right elbow symptoms  Patient responded well to treatment for both  Teres remains tight with palpation and reproduced patients chief complaint  Improving LT activation remains with TE  Plan: Continue per plan of care  Precautions: HTN, CKD III, Elevated PSA, Thrombocytopenia    Daily Treatment Diary     Manual  6/10 6/13 6/17 6/24 7/2 7/5 7/16   6/7   L pec release 5' 5' B/L  3' ea B/l 3' ea B/L  3' ea  B/L 3' ea  5'   L lat release 3' 3' B/L  3' ea B/l 3; ea B/L  3' ea  B/L 3' ea  3'   L IR stretching 4x30" 4x30" 4x30" 4x30" 4x30" 4x30"    4x30"   L posterior capsule JM             Teres release 7'/7' 7'/7' B/L  3' ea B/l  3'  B/L 3' ea  B/L 3' ea   B/L  3' ea    7'/7'   Re-assessment     15'        R Radiohumeral distraction     Grade IV Grade IV Grade IV      Ulnar n  glides     2x10 2x10 2x10      R laser to medial epicondyle     NV procotol 6'36"   protocol 6'36"      B/L laser to shoulder       6'/6'          Exercise Diary  6/7 6/10 6/13 6/17 6/24 7/2 7/5 7/16  5/13   Postural correction on UBE     3'/3'   lvl 4 3'/3'  L4 3'/3'  L4 3'/3'  L4     Sleeper stretch             S/L ER             Pec corner stretch             Prone middle trap             LT wall slides w/ towel 90-90 ER w/ BTB             L Lat stretch             Standing reaches (snow angels)             Body blade ER/IR             Physioball pec stretch             Supine serratus punches             Scapular retractions             1/2 foam roll protocol             Lower trap lifts 3x10 3x10        3x10   Posterior capsule stretch 4x30" 4x30"        4x30"   LT wall slide   2x10 2x10 2x10 2x10    3x10   LT holds 2x10x5" 2x10 x5"        2x10x5"   LT depression w/ big PTB at redcord x2 fatigue x2 fatigue x2 fatigue x2 fatigue  x2 fatigue x2 fatigue x3 fatigue (P&B) x3 fatigue (P&B)  x2 fatigue   Foam roll protocol  1x5' 1x5' 1x 5' 1x 5 1x5' 1x5' 1x5'     [place] scap depression + retraction   2x10 Forward lean  10x defered 2x 2x10 2x10      Right flexor digitorum stretch      NV           Modalities

## 2019-07-18 ENCOUNTER — OFFICE VISIT (OUTPATIENT)
Dept: PHYSICAL THERAPY | Facility: REHABILITATION | Age: 54
End: 2019-07-18
Payer: COMMERCIAL

## 2019-07-18 ENCOUNTER — OFFICE VISIT (OUTPATIENT)
Dept: OBGYN CLINIC | Facility: OTHER | Age: 54
End: 2019-07-18
Payer: COMMERCIAL

## 2019-07-18 VITALS
DIASTOLIC BLOOD PRESSURE: 82 MMHG | WEIGHT: 207 LBS | SYSTOLIC BLOOD PRESSURE: 122 MMHG | HEIGHT: 67 IN | BODY MASS INDEX: 32.49 KG/M2

## 2019-07-18 DIAGNOSIS — M25.512 BILATERAL SHOULDER PAIN, UNSPECIFIED CHRONICITY: ICD-10-CM

## 2019-07-18 DIAGNOSIS — M25.511 BILATERAL SHOULDER PAIN, UNSPECIFIED CHRONICITY: ICD-10-CM

## 2019-07-18 DIAGNOSIS — M77.8 TRICEPS TENDINITIS: ICD-10-CM

## 2019-07-18 DIAGNOSIS — M25.511 ACUTE PAIN OF RIGHT SHOULDER: ICD-10-CM

## 2019-07-18 DIAGNOSIS — M75.42 IMPINGEMENT SYNDROME OF LEFT SHOULDER: Primary | ICD-10-CM

## 2019-07-18 DIAGNOSIS — M77.01 MEDIAL EPICONDYLITIS OF ELBOW, RIGHT: ICD-10-CM

## 2019-07-18 DIAGNOSIS — M75.41 IMPINGEMENT SYNDROME OF RIGHT SHOULDER: Primary | ICD-10-CM

## 2019-07-18 PROCEDURE — 97140 MANUAL THERAPY 1/> REGIONS: CPT | Performed by: PHYSICAL THERAPIST

## 2019-07-18 PROCEDURE — 99213 OFFICE O/P EST LOW 20 MIN: CPT | Performed by: ORTHOPAEDIC SURGERY

## 2019-07-18 NOTE — PROGRESS NOTES
Assessment  Diagnoses and all orders for this visit:    Impingement syndrome of right shoulder  -     MRI shoulder right wo contrast; Future    Acute pain of right shoulder  -     MRI shoulder right wo contrast; Future        Discussion and Plan:  Given the patient's lack of improvement with physical therapy and a subacromial corticosteroid injection they are indicated this time for an MRI scan to evaluate for surgical pathology  We will review the results of the study when it has been obtained and discuss further treatment options  Subjective:   Patient ID: Dorene Gonzales is a 48 y o  male      HPI  Patient is here today for follow up of B/L shoulder impingement syndrome  Right > Left  Patient received an injection at the last visit which did provide temporary relief  Patient has been attending PT as instructed  He states he continues to have pain with ROM and is relieved by rest   He denies any numbness tingling, fever, or chills  Symptoms wake him from sleep at night  The following portions of the patient's history were reviewed and updated as appropriate: allergies, current medications, past family history, past medical history, past social history, past surgical history and problem list     Review of Systems   Constitutional: Negative for chills and fever  HENT: Negative for drooling and hearing loss  Eyes: Negative for visual disturbance  Respiratory: Negative for cough and shortness of breath  Cardiovascular: Negative for chest pain  Gastrointestinal: Negative for abdominal pain  Skin: Negative for rash  Psychiatric/Behavioral: Negative for agitation  Objective:  Right Shoulder Exam     Tenderness   The patient is experiencing no tenderness  Range of Motion   The patient has normal right shoulder ROM      Muscle Strength   Abduction: 5/5   External rotation: 4/5     Other   Erythema: absent  Sensation: normal  Pulse: present      Left Shoulder Exam     Tenderness   The patient is experiencing no tenderness  Range of Motion   The patient has normal left shoulder ROM  Muscle Strength   Abduction: 5/5   External rotation: 5/5     Other   Erythema: absent  Sensation: normal  Pulse: present             Physical Exam   Constitutional: He appears well-developed and well-nourished  HENT:   Head: Normocephalic  Eyes: Pupils are equal, round, and reactive to light  Pulmonary/Chest: Effort normal    Skin: Skin is warm and dry  Psychiatric: He has a normal mood and affect  Vitals reviewed             Scribe Attestation    I,:   Ewa Redmond am acting as a scribe while in the presence of the attending physician :        I,:   Angela Graves MD personally performed the services described in this documentation    as scribed in my presence :

## 2019-07-18 NOTE — PROGRESS NOTES
Daily Note     Today's date: 2019  Patient name: Misael Torre  : 1965  MRN: [id number]  Referring provider: Florian Yoo MD  Dx:   Encounter Diagnosis     ICD-10-CM    1  Impingement syndrome of left shoulder M75 42    2  Bilateral shoulder pain, unspecified chronicity M25 511     M25 512    3  Triceps tendinitis M77 9    4  Medial epicondylitis of elbow, right M77 01                   Subjective: Patient reported decreased symptoms following tx session with the laser but notes increased tightness pre-tx from his workout  Objective: See treatment diary below      Assessment: Patient responded well to laser treatment session without adverse reactions  No TE performed secondary to time constraints with ortho appointment  Plan: Continue per plan of care  Precautions: HTN, CKD III, Elevated PSA, Thrombocytopenia    Daily Treatment Diary     Manual  6/10 6/13 6/17 6/24 7/2 7/5 7/16 7/18  6/7   L pec release 5' 5' B/L  3' ea B/l 3' ea B/L  3' ea  B/L 3' ea  5'   L lat release 3' 3' B/L  3' ea B/l 3; ea B/L  3' ea  B/L 3' ea  3'   L IR stretching 4x30" 4x30" 4x30" 4x30" 4x30" 4x30"    4x30"   L posterior capsule JM             Teres release 7'/7' 7'/7' B/L  3' ea B/l  3'  B/L 3' ea  B/L 3' ea  B/L  3' ea   B/L  3' ea   7'/7'   Re-assessment     15'        R Radiohumeral distraction     Grade IV Grade IV Grade IV Grade IV     Ulnar n  glides     2x10 2x10 2x10 2x10     R laser to medial epicondyle     NV procotol 6'36"   protocol 636" protocol 636"     B/L laser to shoulder       6'/6' 6'/6'         Exercise Diary  6/7 6/10 6/13 6/17 6/24 7/2 7/5 7/16  5/13   Postural correction on UBE     3'/3'   lvl 4 3'/3'  L4 3'/3'  L4 3'/3'  L4     Sleeper stretch             S/L ER             Pec corner stretch             Prone middle trap             LT wall slides w/ towel             90-90 ER w/ BTB             L Lat stretch             Standing reaches (snow angels)             Body blade ER/IR             Physioball pec stretch             Supine serratus punches             Scapular retractions             1/2 foam roll protocol             Lower trap lifts 3x10 3x10        3x10   Posterior capsule stretch 4x30" 4x30"        4x30"   LT wall slide   2x10 2x10 2x10 2x10    3x10   LT holds 2x10x5" 2x10 x5"        2x10x5"   LT depression w/ big PTB at redcord x2 fatigue x2 fatigue x2 fatigue x2 fatigue  x2 fatigue x2 fatigue x3 fatigue (P&B) x3 fatigue (P&B)  x2 fatigue   Foam roll protocol  1x5' 1x5' 1x 5' 1x 5 1x5' 1x5' 1x5'     [place] scap depression + retraction   2x10 Forward lean  10x defered 2x 2x10 2x10      Right flexor digitorum stretch      NV           Modalities

## 2019-07-23 ENCOUNTER — OFFICE VISIT (OUTPATIENT)
Dept: PHYSICAL THERAPY | Facility: REHABILITATION | Age: 54
End: 2019-07-23
Payer: COMMERCIAL

## 2019-07-23 DIAGNOSIS — M77.01 MEDIAL EPICONDYLITIS OF ELBOW, RIGHT: ICD-10-CM

## 2019-07-23 DIAGNOSIS — M75.42 IMPINGEMENT SYNDROME OF LEFT SHOULDER: Primary | ICD-10-CM

## 2019-07-23 DIAGNOSIS — M25.512 BILATERAL SHOULDER PAIN, UNSPECIFIED CHRONICITY: ICD-10-CM

## 2019-07-23 DIAGNOSIS — M25.511 BILATERAL SHOULDER PAIN, UNSPECIFIED CHRONICITY: ICD-10-CM

## 2019-07-23 DIAGNOSIS — M77.8 TRICEPS TENDINITIS: ICD-10-CM

## 2019-07-23 PROCEDURE — 97140 MANUAL THERAPY 1/> REGIONS: CPT | Performed by: PHYSICAL THERAPIST

## 2019-07-23 PROCEDURE — 97110 THERAPEUTIC EXERCISES: CPT | Performed by: PHYSICAL THERAPIST

## 2019-07-23 NOTE — PROGRESS NOTES
Daily Note     Today's date: 2019  Patient name: Dorene Gonzales  : 1965  MRN: [id number]  Referring provider: Paris Reno MD  Dx:   Encounter Diagnosis     ICD-10-CM    1  Impingement syndrome of left shoulder M75 42    2  Bilateral shoulder pain, unspecified chronicity M25 511     M25 512    3  Triceps tendinitis M77 9    4  Medial epicondylitis of elbow, right M77 01                   Subjective: Patient reports minimal pain relief thus far from laser treatments but acknowledges need for additional treatments for a cumulate effect  Objective: See treatment diary below      Assessment: Continued manuals with laser treatment as per patient request   Patient demonstrated good tolerance with laser treatment and continued improvement in LT activation/stabilization of the scapula  Plan: Continue per plan of care  Precautions: HTN, CKD III, Elevated PSA, Thrombocytopenia    Daily Treatment Diary     Manual  6/10 6/13 6/17 6/24 7/2 7/5 7/16 7/18 7/23 6/7   L pec release 5' 5' B/L  3' ea B/l 3' ea B/L  3' ea  B/L 3' ea  B/L  3' ea  5'   L lat release 3' 3' B/L  3' ea B/l 3; ea B/L  3' ea  B/L 3' ea  3'   L IR stretching 4x30" 4x30" 4x30" 4x30" 4x30" 4x30"    4x30"   L posterior capsule JM             Teres release 7'/7' 7'/7' B/L  3' ea B/l  3'  B/L 3' ea  B/L 3' ea  B/L  3' ea    B/L  3' ea  7'/7'   Re-assessment     15'        R Radiohumeral distraction     Grade IV Grade IV Grade IV Grade IV Grade IV    Ulnar n  glides     2x10 2x10 2x10 2x10 2x10     R laser to medial epicondyle     NV procotol "   protocol " protocol " protocol "    B/L laser to shoulder       6'/6' 6'/6' 6'/6'        Exercise Diary  6/7 6/10 6/13 6/17 6/24 7/2 7/5 7/16 7/23 5/13   Postural correction on UBE     3'/3'   lvl 4 3'/3'  L4 3'/3'  L4 3'/3'  L4     Sleeper stretch             S/L ER             Pec corner stretch             Prone middle trap             LT wall slides w/ towel 90-90 ER w/ BTB             L Lat stretch             Standing reaches (snow angels)             Body blade ER/IR             Physioball pec stretch             Supine serratus punches             Scapular retractions             1/2 foam roll protocol             Lower trap lifts 3x10 3x10        3x10   Posterior capsule stretch 4x30" 4x30"        4x30"   LT wall slide   2x10 2x10 2x10 2x10    3x10   LT holds 2x10x5" 2x10 x5"        2x10x5"   LT depression w/ big PTB at redcord x2 fatigue x2 fatigue x2 fatigue x2 fatigue  x2 fatigue x2 fatigue x3 fatigue (P&B) x3 fatigue (P&B) x3 fatigue (P&B) x2 fatigue   Foam roll protocol  1x5' 1x5' 1x 5' 1x 5 1x5' 1x5' 1x5' 1x5'    [place] scap depression + retraction   2x10 Forward lean  10x defered 2x 2x10 2x10      Right flexor digitorum stretch      NV           Modalities

## 2019-07-26 ENCOUNTER — OFFICE VISIT (OUTPATIENT)
Dept: PHYSICAL THERAPY | Facility: REHABILITATION | Age: 54
End: 2019-07-26
Payer: COMMERCIAL

## 2019-07-26 DIAGNOSIS — M25.511 BILATERAL SHOULDER PAIN, UNSPECIFIED CHRONICITY: ICD-10-CM

## 2019-07-26 DIAGNOSIS — M75.42 IMPINGEMENT SYNDROME OF LEFT SHOULDER: Primary | ICD-10-CM

## 2019-07-26 DIAGNOSIS — M77.01 MEDIAL EPICONDYLITIS OF ELBOW, RIGHT: ICD-10-CM

## 2019-07-26 DIAGNOSIS — M77.8 TRICEPS TENDINITIS: ICD-10-CM

## 2019-07-26 DIAGNOSIS — M25.512 BILATERAL SHOULDER PAIN, UNSPECIFIED CHRONICITY: ICD-10-CM

## 2019-07-26 PROCEDURE — 97110 THERAPEUTIC EXERCISES: CPT | Performed by: PHYSICAL THERAPIST

## 2019-07-26 PROCEDURE — 97140 MANUAL THERAPY 1/> REGIONS: CPT | Performed by: PHYSICAL THERAPIST

## 2019-07-26 NOTE — PROGRESS NOTES
Daily Note     Today's date: 2019  Patient name: Ebony Ayon  : 1965  MRN: [id number]  Referring provider: Chencho Crockett MD  Dx:   Encounter Diagnosis     ICD-10-CM    1  Impingement syndrome of left shoulder M75 42    2  Bilateral shoulder pain, unspecified chronicity M25 511     M25 512    3  Triceps tendinitis M77 9    4  Medial epicondylitis of elbow, right M77 01                   Subjective: Patient reports continued B/L shoulder pain and has scheduled his MRI  Objective: See treatment diary below      Assessment: Despite pain, patients function continues to improve including ER strength as per testing  Will discontinue laser treatment secondary to limited results and added teres rolling to patients HEP  LT activation improving as per improved scapulohumeral rhythm  Plan: Continue per plan of care  Precautions: HTN, CKD III, Elevated PSA, Thrombocytopenia    Daily Treatment Diary     Manual  6/10 6/13 6/17 6/24 7/2 7/5 7/16 7/18 7/23 7/26   L pec release 5' 5' B/L  3' ea B/l 3' ea B/L  3' ea  B/L 3' ea  B/L  3' ea  L lat release 3' 3' B/L  3' ea B/l 3; ea B/L  3' ea  B/L 3' ea  L IR stretching 4x30" 4x30" 4x30" 4x30" 4x30" 4x30"       L posterior capsule JM             Teres release 7'/7' 7'/7' B/L  3' ea B/l  3'  B/L 3' ea  B/L 3' ea  B/L  3' ea  B/L  3' ea  B/L  3' ea     Re-assessment     15'        R Radiohumeral distraction     Grade IV Grade IV Grade IV Grade IV Grade IV Grade IV   Ulnar n  glides     2x10 2x10 2x10 2x10 2x10  2x10   R laser to medial epicondyle     NV procotol "   protocol " protocol " protocol " protocol "   B/L laser to shoulder       6'/6' 6'/6' 6'/6' 6'/6'   B/L Triceps stretching          4x30" B/L       Exercise Diary  6/7 6/10 6/13 6/17 6/24 7/2 7/5 7/16 7/23 7/26   Postural correction on UBE     3'/3'   lvl 4 3'/3'  L4 3'/3'  L4 3'/3'  L4     Sleeper stretch             S/L ER             Pec corner stretch Prone middle trap             LT wall slides w/ towel             90-90 ER w/ BTB             L Lat stretch             Standing reaches (snow angels)             Body blade ER/IR             Physioball pec stretch             Supine serratus punches             Scapular retractions             1/2 foam roll protocol             Lower trap lifts 3x10 3x10           Posterior capsule stretch 4x30" 4x30"           LT wall slide   2x10 2x10 2x10 2x10       LT holds 2x10x5" 2x10 x5"           LT depression w/ big PTB at redcord x2 fatigue x2 fatigue x2 fatigue x2 fatigue  x2 fatigue x2 fatigue x3 fatigue (P&B) x3 fatigue (P&B) x3 fatigue (P&B) x2 fatigue (P&B)    (Foam roll protocol  1x5' 1x5' 1x 5' 1x 5 1x5' 1x5' 1x5' 1x5' 1x5'   [place] scap depression + retraction   2x10 Forward lean  10x defered 2x 2x10 2x10      Right flexor digitorum stretch      NV           Modalities

## 2019-07-28 ENCOUNTER — HOSPITAL ENCOUNTER (OUTPATIENT)
Dept: RADIOLOGY | Facility: HOSPITAL | Age: 54
Discharge: HOME | End: 2019-07-28
Attending: ORTHOPAEDIC SURGERY
Payer: COMMERCIAL

## 2019-07-28 DIAGNOSIS — M25.511 ACUTE PAIN OF RIGHT SHOULDER: ICD-10-CM

## 2019-07-28 DIAGNOSIS — M75.41 IMPINGEMENT SYNDROME OF RIGHT SHOULDER: ICD-10-CM

## 2019-07-28 PROCEDURE — 73221 MRI JOINT UPR EXTREM W/O DYE: CPT

## 2019-07-30 ENCOUNTER — OFFICE VISIT (OUTPATIENT)
Dept: OBGYN CLINIC | Facility: OTHER | Age: 54
End: 2019-07-30
Payer: COMMERCIAL

## 2019-07-30 VITALS
HEART RATE: 62 BPM | DIASTOLIC BLOOD PRESSURE: 77 MMHG | SYSTOLIC BLOOD PRESSURE: 118 MMHG | BODY MASS INDEX: 32.87 KG/M2 | WEIGHT: 209.4 LBS | HEIGHT: 67 IN

## 2019-07-30 DIAGNOSIS — M75.51 SUBACROMIAL BURSITIS OF RIGHT SHOULDER JOINT: ICD-10-CM

## 2019-07-30 DIAGNOSIS — M75.81 TENDINITIS OF RIGHT ROTATOR CUFF: Primary | ICD-10-CM

## 2019-07-30 DIAGNOSIS — M25.511 ACUTE PAIN OF RIGHT SHOULDER: ICD-10-CM

## 2019-07-30 PROCEDURE — 99214 OFFICE O/P EST MOD 30 MIN: CPT | Performed by: ORTHOPAEDIC SURGERY

## 2019-07-30 RX ORDER — ALFUZOSIN HYDROCHLORIDE 10 MG/1
TABLET, EXTENDED RELEASE ORAL
COMMUNITY
Start: 2019-03-22 | End: 2020-02-05 | Stop reason: ALTCHOICE

## 2019-07-30 NOTE — PROGRESS NOTES
Assessment  Diagnoses and all orders for this visit:    Tendinitis of right rotator cuff    Subacromial bursitis of right shoulder joint    Acute pain of right shoulder          Discussion and Plan:  The patient has an examination and MRI both consistent with right shoulder RTC tendonitis and bursitis  I have discussed with the patient the pathophysiology of this diagnosis and reviewed how the examination correlates with this diagnosis  I do not recommend surgical intervention  Injection can safely be repeated in October if needed  Patient will continue home exercise program   I do feel that the patient has an over developed deltoid which is likely contributing to the dysfunction of his shoulder and hands his symptoms  This may be improved by continued therapy and he is interested in continuing with home exercise program        Instructed the patient to contact the use of nonsteroidal anti-inflammatories that may be safer for his kidneys such as possibly Celebrex with his PCP or Nephrologist   We also discussed possible referral to a rheumatologist but he states he has seen 2 in the past without significant improvement in symptoms  Subjective:   Patient ID: Juanita Mcfarlane is a 48 y o  male      HPI  Patient presents today to discuss the findings of his right shoulder MRI  Patient is here today for follow up of B/L pain  Right > Left  Patient received an injection at the visit on 5/30/19 which did provide temporary relief  Patient had been attending PT as instructed  He states he continues to have pain with ROM and is relieved by rest   He denies any numbness tingling, fever, or chills  Symptoms wake him from sleep at night       The following portions of the patient's history were reviewed and updated as appropriate: allergies, current medications, past family history, past medical history, past social history, past surgical history and problem list     Review of Systems   Constitutional: Negative for chills and fever  HENT: Negative for drooling and hearing loss  Eyes: Negative for visual disturbance  Respiratory: Negative for cough and shortness of breath  Cardiovascular: Negative for chest pain  Gastrointestinal: Negative for abdominal pain  Skin: Negative for rash  Psychiatric/Behavioral: Negative for agitation  Objective:  Right Shoulder Exam     Tenderness   The patient is experiencing no tenderness  Range of Motion   The patient has normal right shoulder ROM  Muscle Strength   Abduction: 5/5   External rotation: 5/5     Other   Erythema: absent  Sensation: normal  Pulse: present    Comments:    Mildly positive becker's            Physical Exam   Constitutional: He appears well-developed and well-nourished  HENT:   Head: Normocephalic  Eyes: Pupils are equal, round, and reactive to light  Pulmonary/Chest: Effort normal    Skin: Skin is warm and dry  Psychiatric: He has a normal mood and affect  Vitals reviewed  I have personally reviewed pertinent films in PACS and my interpretation is as follows    Right shoulder MRI demonstrates insertional tendinosis supraspinatus and infraspinatus without tear, subacromial bursitis, mild AC arthritis     Scribe Attestation    I,:   Adelaide Baker am acting as a scribe while in the presence of the attending physician :        I,:   Kolby Donahue MD personally performed the services described in this documentation    as scribed in my presence :

## 2019-07-31 ENCOUNTER — OFFICE VISIT (OUTPATIENT)
Dept: PHYSICAL THERAPY | Facility: REHABILITATION | Age: 54
End: 2019-07-31
Payer: COMMERCIAL

## 2019-07-31 DIAGNOSIS — M77.01 MEDIAL EPICONDYLITIS OF ELBOW, RIGHT: ICD-10-CM

## 2019-07-31 DIAGNOSIS — M25.512 BILATERAL SHOULDER PAIN, UNSPECIFIED CHRONICITY: ICD-10-CM

## 2019-07-31 DIAGNOSIS — M77.8 TRICEPS TENDINITIS: ICD-10-CM

## 2019-07-31 DIAGNOSIS — M75.42 IMPINGEMENT SYNDROME OF LEFT SHOULDER: Primary | ICD-10-CM

## 2019-07-31 DIAGNOSIS — M25.511 BILATERAL SHOULDER PAIN, UNSPECIFIED CHRONICITY: ICD-10-CM

## 2019-07-31 PROCEDURE — 97110 THERAPEUTIC EXERCISES: CPT | Performed by: PHYSICAL THERAPIST

## 2019-07-31 PROCEDURE — 97140 MANUAL THERAPY 1/> REGIONS: CPT | Performed by: PHYSICAL THERAPIST

## 2019-07-31 NOTE — PROGRESS NOTES
Daily Note     Today's date: 2019  Patient name: Miguelina Bush  : 1965  MRN: [id number]  Referring provider: Mariposa Roberson MD  Dx:   Encounter Diagnosis     ICD-10-CM    1  Impingement syndrome of left shoulder M75 42    2  Bilateral shoulder pain, unspecified chronicity M25 511     M25 512    3  Triceps tendinitis M77 9    4  Medial epicondylitis of elbow, right M77 01                   Subjective: Patient reports compliance with his HEP and states that he continues to experience soreness B/L  Objective: See treatment diary below      Assessment: Patient demonstrating improved ER ROM B/L with significant teres tightness  Continued LT activation and pec stretching for additional subacromial space  Plan: Continue per plan of care  Precautions: HTN, CKD III, Elevated PSA, Thrombocytopenia    Daily Treatment Diary     Manual     L pec release             L lat release             L IR stretching             L posterior capsule JM             Teres release B/L  3' ea  B/L  3' ea     Re-assessment             R Radiohumeral distraction Grade IV         Grade IV   Ulnar n  glides 2x10    2x10 2x10 2x10 2x10 2x10  2x10   R laser to medial epicondyle     NV procotol 636"   protocol 36" protocol 36" protocol 36" protocol 36"   B/L laser to shoulder       6'/6' 6'/6' 6'/6' 6'/6'   B/L Triceps stretching 4x30" B/L         4x30" B/L       Exercise Diary     Postural correction on UBE             Sleeper stretch             S/L ER             Pec corner stretch             Prone middle trap             LT wall slides w/ towel             90-90 ER w/ BTB             L Lat stretch             Standing reaches (snow angels)             Body blade ER/IR             Physioball pec stretch             Supine serratus punches             Scapular retractions             1/2 foam roll protocol             Lower trap lifts             Posterior capsule stretch             LT wall slide             LT holds             LT depression w/ big PTB at redcord x2 fatigue (P&B)         x2 fatigue (P&B)    (Foam roll protocol 1x5'         1x5'   Redcord scap depression + retraction             Right flexor digitorum stretch                 Modalities

## 2019-08-07 ENCOUNTER — APPOINTMENT (OUTPATIENT)
Dept: PHYSICAL THERAPY | Facility: REHABILITATION | Age: 54
End: 2019-08-07
Payer: COMMERCIAL

## 2019-08-07 DIAGNOSIS — I10 ESSENTIAL HYPERTENSION: ICD-10-CM

## 2019-08-07 RX ORDER — VALSARTAN 160 MG/1
TABLET ORAL
Qty: 30 TABLET | Refills: 3 | Status: SHIPPED | OUTPATIENT
Start: 2019-08-07 | End: 2019-12-03 | Stop reason: SDUPTHER

## 2019-08-09 ENCOUNTER — OFFICE VISIT (OUTPATIENT)
Dept: PHYSICAL THERAPY | Facility: REHABILITATION | Age: 54
End: 2019-08-09
Payer: COMMERCIAL

## 2019-08-09 DIAGNOSIS — M25.511 BILATERAL SHOULDER PAIN, UNSPECIFIED CHRONICITY: ICD-10-CM

## 2019-08-09 DIAGNOSIS — M77.01 MEDIAL EPICONDYLITIS OF ELBOW, RIGHT: ICD-10-CM

## 2019-08-09 DIAGNOSIS — M77.8 TRICEPS TENDINITIS: ICD-10-CM

## 2019-08-09 DIAGNOSIS — M75.42 IMPINGEMENT SYNDROME OF LEFT SHOULDER: Primary | ICD-10-CM

## 2019-08-09 DIAGNOSIS — M25.512 BILATERAL SHOULDER PAIN, UNSPECIFIED CHRONICITY: ICD-10-CM

## 2019-08-09 PROCEDURE — 97140 MANUAL THERAPY 1/> REGIONS: CPT | Performed by: PHYSICAL THERAPIST

## 2019-08-09 PROCEDURE — 97110 THERAPEUTIC EXERCISES: CPT | Performed by: PHYSICAL THERAPIST

## 2019-08-09 NOTE — PROGRESS NOTES
Daily Note     Today's date: 2019  Patient name: Hoa Taylor  : 1965  MRN: [id number]  Referring provider: Myrlene Bumpers, MD  Dx:   Encounter Diagnosis     ICD-10-CM    1  Impingement syndrome of left shoulder M75 42    2  Bilateral shoulder pain, unspecified chronicity M25 511     M25 512    3  Triceps tendinitis M77 9    4  Medial epicondylitis of elbow, right M77 01                   Subjective: Patient reports compliance with his HEP and states that his continued to experience B/L shoulder soreness  Objective: See treatment diary below      Assessment: Educated patient regarding temporary termination of strengthening above 90 degrees of elevation and pec strengthening to reduce impingement signs/symptoms  Continued LT and scapular depression strengthening and patient will perform with his HEP  Plan: Continue per plan of care  Precautions: HTN, CKD III, Elevated PSA, Thrombocytopenia    Daily Treatment Diary     Manual     L pec release             L lat release             L IR stretching             L posterior capsule JM             Teres release B/L  3' ea  B/L 5' ea  B/L  3' ea     Re-assessment             R Radiohumeral distraction Grade IV Grade IV        Grade IV   Ulnar n  glides 2x10 2x10   2x10 2x10 2x10 2x10 2x10  2x10   R laser to medial epicondyle     NV procotol "   protocol " protocol " protocol " protocol "   B/L laser to shoulder       6'/6' 6'/6' 6'/6' 6'/6'   B/L Triceps stretching 4x30" B/L 4x30" B/L        4x30" B/L       Exercise Diary     Postural correction on UBE             Sleeper stretch             S/L ER             Pec corner stretch             Prone middle trap             LT wall slides w/ towel             90-90 ER w/ BTB  3x10           L Lat stretch             Standing reaches (snow angels)             Body blade ER/IR             Physioball pec stretch             Supine serratus punches             Scapular retractions             1/2 foam roll protocol             Lower trap lifts             Posterior capsule stretch             LT wall slide             LT holds             LT depression w/ big PTB at redcord x2 fatigue (P&B) x2 fatigue (P&B)        x2 fatigue (P&B)    (Foam roll protocol 1x5' 1x5'        1x5'   Redcord scap depression + retraction             Right flexor digitorum stretch                 Modalities

## 2019-08-12 ENCOUNTER — OFFICE VISIT (OUTPATIENT)
Dept: UROLOGY | Facility: MEDICAL CENTER | Age: 54
End: 2019-08-12
Payer: COMMERCIAL

## 2019-08-12 VITALS
HEART RATE: 76 BPM | DIASTOLIC BLOOD PRESSURE: 82 MMHG | SYSTOLIC BLOOD PRESSURE: 130 MMHG | BODY MASS INDEX: 32.96 KG/M2 | WEIGHT: 210 LBS | HEIGHT: 67 IN

## 2019-08-12 DIAGNOSIS — R97.20 ELEVATED PROSTATE SPECIFIC ANTIGEN (PSA): ICD-10-CM

## 2019-08-12 DIAGNOSIS — N40.1 BPH WITH OBSTRUCTION/LOWER URINARY TRACT SYMPTOMS: Primary | ICD-10-CM

## 2019-08-12 DIAGNOSIS — N52.8 MIXED ERECTILE DYSFUNCTION: ICD-10-CM

## 2019-08-12 DIAGNOSIS — N13.8 BPH WITH OBSTRUCTION/LOWER URINARY TRACT SYMPTOMS: Primary | ICD-10-CM

## 2019-08-12 LAB
SL AMB  POCT GLUCOSE, UA: NORMAL
SL AMB LEUKOCYTE ESTERASE,UA: NORMAL
SL AMB POCT BILIRUBIN,UA: NORMAL
SL AMB POCT BLOOD,UA: NORMAL
SL AMB POCT CLARITY,UA: CLEAR
SL AMB POCT COLOR,UA: YELLOW
SL AMB POCT KETONES,UA: NORMAL
SL AMB POCT NITRITE,UA: NORMAL
SL AMB POCT PH,UA: 6
SL AMB POCT SPECIFIC GRAVITY,UA: 1.01
SL AMB POCT URINE PROTEIN: NORMAL
SL AMB POCT UROBILINOGEN: 0.2

## 2019-08-12 PROCEDURE — 81003 URINALYSIS AUTO W/O SCOPE: CPT | Performed by: UROLOGY

## 2019-08-12 PROCEDURE — 99214 OFFICE O/P EST MOD 30 MIN: CPT | Performed by: UROLOGY

## 2019-08-12 RX ORDER — SILDENAFIL 100 MG/1
100 TABLET, FILM COATED ORAL AS NEEDED
Qty: 24 TABLET | Refills: 3 | Status: SHIPPED | OUTPATIENT
Start: 2019-08-12 | End: 2020-02-05 | Stop reason: ALTCHOICE

## 2019-08-14 NOTE — PROGRESS NOTES
Assessment/Plan:  1  Symptom wise, doing well with voiding in the ED    2  Continue to check PSA once per year, certainly BPH is most likely cause for his elevated PSA  No problem-specific Assessment & Plan notes found for this encounter  Diagnoses and all orders for this visit:    BPH with obstruction/lower urinary tract symptoms  -     POCT urine dip auto non-scope  -     PSA Total, Diagnostic; Future    Mixed erectile dysfunction  -     sildenafil (VIAGRA) 100 mg tablet; Take 1 tablet (100 mg total) by mouth as needed for erectile dysfunction    Elevated prostate specific antigen (PSA)  -     PSA Total, Diagnostic; Future          Subjective:      Patient ID: Carmen Orta is a 48 y o  male  1  Follow-up for BPH, variable results to Uroxatral   Overall, content with the symptoms as they are     2  ED, good results to generic sildenafil  3  Elevated PSA, biopsy with us in 2016 was negative, 60 g benign  In 2018, PSA 4 4  In 2016, PSA 4 6      The following portions of the patient's history were reviewed and updated as appropriate: allergies, current medications, past family history, past medical history, past social history, past surgical history and problem list     Review of Systems   All other systems reviewed and are negative  Objective:      /82 (BP Location: Left arm, Patient Position: Sitting, Cuff Size: Adult)   Pulse 76   Ht 5' 7" (1 702 m)   Wt 95 3 kg (210 lb)   BMI 32 89 kg/m²          Physical Exam   Constitutional: He is oriented to person, place, and time  He appears well-developed and well-nourished  No distress  HENT:   Head: Normocephalic and atraumatic  Eyes: Conjunctivae are normal    Cardiovascular: Normal rate and regular rhythm  Pulmonary/Chest: Effort normal and breath sounds normal  No respiratory distress  He has no wheezes  Abdominal: Soft  Bowel sounds are normal  He exhibits no distension and no mass  There is no tenderness     Genitourinary: Genitourinary Comments: Penis testes normal    Prostate moderately enlarged no nodules   Neurological: He is alert and oriented to person, place, and time  Skin: Skin is warm and dry  He is not diaphoretic

## 2019-09-06 NOTE — PROGRESS NOTES
Jackson Beck has attended a total of 24 physical therapy appointments to date  Patient was last treated on 8/9/19 and has no remaining appointments scheduled  Goals, objective and subjective information unable to be updated at this time  Patient will be discharged from physical therapy secondary to noncompliance with plan of care

## 2019-09-30 DIAGNOSIS — I10 ESSENTIAL HYPERTENSION: ICD-10-CM

## 2019-09-30 RX ORDER — AMLODIPINE BESYLATE 10 MG/1
TABLET ORAL
Qty: 90 TABLET | Refills: 1 | Status: SHIPPED | OUTPATIENT
Start: 2019-09-30 | End: 2020-03-03

## 2019-12-03 DIAGNOSIS — I10 ESSENTIAL HYPERTENSION: ICD-10-CM

## 2019-12-03 RX ORDER — VALSARTAN 160 MG/1
TABLET ORAL
Qty: 30 TABLET | Refills: 1 | Status: SHIPPED | OUTPATIENT
Start: 2019-12-03 | End: 2020-01-27

## 2020-01-02 PROBLEM — D10.39 PAPILLOMA OF PALATE: Status: ACTIVE | Noted: 2020-01-02

## 2020-01-27 DIAGNOSIS — I10 ESSENTIAL HYPERTENSION: ICD-10-CM

## 2020-01-27 RX ORDER — VALSARTAN 160 MG/1
TABLET ORAL
Qty: 30 TABLET | Refills: 0 | Status: SHIPPED | OUTPATIENT
Start: 2020-01-27 | End: 2020-02-25

## 2020-01-30 ENCOUNTER — TELEPHONE (OUTPATIENT)
Dept: FAMILY MEDICINE CLINIC | Facility: CLINIC | Age: 55
End: 2020-01-30

## 2020-01-30 DIAGNOSIS — D69.6 THROMBOCYTOPENIA (HCC): ICD-10-CM

## 2020-01-30 DIAGNOSIS — I10 ESSENTIAL HYPERTENSION: Primary | ICD-10-CM

## 2020-01-30 NOTE — TELEPHONE ENCOUNTER
Patient called to make an appt for 2/13/2020 and would like to know if you want him to have labs done prior    Please call to advise

## 2020-02-05 ENCOUNTER — OFFICE VISIT (OUTPATIENT)
Dept: FAMILY MEDICINE CLINIC | Facility: CLINIC | Age: 55
End: 2020-02-05
Payer: COMMERCIAL

## 2020-02-05 VITALS
HEART RATE: 74 BPM | DIASTOLIC BLOOD PRESSURE: 60 MMHG | WEIGHT: 204 LBS | TEMPERATURE: 99.3 F | HEIGHT: 68 IN | SYSTOLIC BLOOD PRESSURE: 110 MMHG | RESPIRATION RATE: 20 BRPM | BODY MASS INDEX: 30.92 KG/M2

## 2020-02-05 DIAGNOSIS — H10.31 ACUTE BACTERIAL CONJUNCTIVITIS OF RIGHT EYE: ICD-10-CM

## 2020-02-05 DIAGNOSIS — R50.9 RESPIRATORY ILLNESS WITH FEVER: Primary | ICD-10-CM

## 2020-02-05 DIAGNOSIS — J98.9 RESPIRATORY ILLNESS WITH FEVER: Primary | ICD-10-CM

## 2020-02-05 PROCEDURE — 99213 OFFICE O/P EST LOW 20 MIN: CPT | Performed by: FAMILY MEDICINE

## 2020-02-05 PROCEDURE — 3008F BODY MASS INDEX DOCD: CPT | Performed by: FAMILY MEDICINE

## 2020-02-05 PROCEDURE — 3078F DIAST BP <80 MM HG: CPT | Performed by: FAMILY MEDICINE

## 2020-02-05 PROCEDURE — 3074F SYST BP LT 130 MM HG: CPT | Performed by: FAMILY MEDICINE

## 2020-02-05 PROCEDURE — 1036F TOBACCO NON-USER: CPT | Performed by: FAMILY MEDICINE

## 2020-02-05 RX ORDER — TOBRAMYCIN 3 MG/ML
2 SOLUTION/ DROPS OPHTHALMIC
Qty: 1 BOTTLE | Refills: 0 | Status: SHIPPED | OUTPATIENT
Start: 2020-02-05 | End: 2020-02-13

## 2020-02-05 RX ORDER — CEFUROXIME AXETIL 500 MG/1
500 TABLET ORAL 2 TIMES DAILY
Qty: 20 TABLET | Refills: 0 | Status: SHIPPED | OUTPATIENT
Start: 2020-02-05 | End: 2020-02-13

## 2020-02-05 NOTE — PROGRESS NOTES
FAMILY PRACTICE OFFICE VISIT       NAME: Wes Santizo  AGE: 47 y o  SEX: male       : 1965        MRN: [id number]        Assessment and Plan     Problem List Items Addressed This Visit     None      Visit Diagnoses     Respiratory illness with fever    -  Primary    Relevant Medications    cefuroxime (CEFTIN) 500 mg tablet    Other Relevant Orders    XR chest pa & lateral    Acute bacterial conjunctivitis of right eye        Relevant Medications    cefuroxime (CEFTIN) 500 mg tablet    tobramycin (TOBREX) 0 3 % SOLN      Patient presents for evaluation of febrile upper respiratory infection, clinically he presents with sinusitis and conjunctivitis  Patient is concerned about possible recent influenza, he has completed course of Tamiflu x5 days for severe fevers and chills that he had experienced few days ago  Though symptoms have resolved  Patient presents with persistent cough, postnasal drainage, sore throat and residual low-grade fever  Lung exam is clear  He denies wheezing or chest tightness  Will start him on Ceftin 500 mg b i d  for 7 -10 days  Patient will use Tobrex eyedrops for symptoms of right-sided conjunctivitis  Isolation precautions reviewed  Patient will use Tylenol as needed for fever  If his symptoms will not improve significantly in 48 hours-he will proceed with chest x-ray  Patient is scheduled for regular checkup next week will reassess his symptoms at that time  There are no Patient Instructions on file for this visit  Discussed with the patient and all questioned fully answered  He will call me if any problems arise  [employer] software was used to dictate this note  It may contain errors with dictating incorrect words/spelling  Please contact provider directly with any questions         Chief Complaint     Chief Complaint   Patient presents with    Cold Like Symptoms     nasal/head/congestion  X1 week, chills, fatigue, headache  Took Tamiflu       History of Present Illness     Patient presents for evaluation of cold symptoms and fever  He developed high fever and shaking chills on Thursday upon return home from [place]  Patient started Tamiflu that he had on hand Friday night and has completed last dose of medication this morning  He is rigors and chills have subsided  Patient still has low-grade fever today  He is complaining of cough with colored mucus expectoration  Patient is complaining of postnasal drip, sore throat and a intermittent symptoms of bloody noses  He is coughing but denies symptoms of hemoptysis  Patient has no symptoms of headache anymore but is complaining of nasal congestion and sinus pressure  He feels fatigued  Review of Systems   Review of Systems   Constitutional: Positive for fatigue and fever (Low-grade now, improved)  HENT: Positive for congestion, postnasal drip and sore throat  Eyes: Positive for redness  Right eye, watery, redness, sensation of "film"   Respiratory: Positive for cough  Negative for chest tightness, shortness of breath and wheezing  Cardiovascular: Negative  Gastrointestinal: Negative  Neurological: Negative  Psychiatric/Behavioral: Negative          Active Problem List     Patient Active Problem List   Diagnosis    Essential hypertension    Myofascial pain syndrome    CKD (chronic kidney disease) stage 3, GFR 30-59 ml/min (HCC)    BPH with obstruction/lower urinary tract symptoms    Elevated prostate specific antigen (PSA)    Angiomyolipoma of kidney    Mixed erectile dysfunction    Impingement syndrome of right shoulder    Thrombocytopenia (HCC)    Urticaria    Hemorrhoids, external, thrombosed    Impingement syndrome of left shoulder    Subacromial bursitis of right shoulder joint    Acute pain of right shoulder    Papilloma of palate       Past Medical History:  Past Medical History:   Diagnosis Date    Benign prostatic hyperplasia     Herpes simplex RESOLVED [id number]    Hypertension     Renal disorder        Past Surgical History:  Past Surgical History:   Procedure Laterality Date    COLONOSCOPY  12/23/2015    DR [facility]ED[facility]    PROSTATE BIOPSY         Family History:  Family History   Problem Relation Age of Onset    Other Mother         CABG    Diabetes Mother     Hypertension Mother     Hypertension Father     Kidney cancer Family     Prostate cancer Family     Hypertension Brother     Hypertension Sister     Celiac disease Brother     No Known Problems Son     Eczema Daughter        Social History:  Social History     Socioeconomic History    Marital status: /Civil Union     Spouse name: Ivana     Number of children: 2    Years of education: Not on file    Highest education level: Not on file   Occupational History    Occupation: /[employer]   Social Needs    Financial resource strain: Not on file    Food insecurity:     Worry: Not on file     Inability: Not on file    Transportation needs:     Medical: Not on file     Non-medical: Not on file   Tobacco Use    Smoking status: Never Smoker    Smokeless tobacco: Never Used   Substance and Sexual Activity    Alcohol use: No    Drug use: No    Sexual activity: Not on file   Lifestyle    Physical activity:     Days per week: 6 days     Minutes per session: 80 min    Stress: Not on file   Relationships    Social connections:     Talks on phone: Not on file     Gets together: Not on file     Attends [employer] service: Not on file     Active member of club or organization: Not on file     Attends meetings of clubs or organizations: Not on file     Relationship status: Not on file    Intimate partner violence:     Fear of current or ex partner: Not on file     Emotionally abused: Not on file     Physically abused: Not on file     Forced sexual activity: Not on file   Other Topics Concern    Not on file   Social History Narrative    Not on file           Objective Vitals:    02/05/20 1407   BP: 110/60   BP Location: Left arm   Patient Position: Sitting   Cuff Size: Large   Pulse: 74   Resp: 20   Temp: 99 3 °F (37 4 °C)   TempSrc: Tympanic   Weight: 92 5 kg (204 lb)   Height: 5' 7 5" (1 715 m)     Wt Readings from Last 3 Encounters:   02/05/20 92 5 kg (204 lb)   01/02/20 88 9 kg (196 lb)   08/12/19 95 3 kg (210 lb)       Physical Exam   Constitutional: He is oriented to person, place, and time  He appears well-developed and well-nourished  HENT:   Head: Normocephalic and atraumatic  Right Ear: Tympanic membrane and ear canal normal    Left Ear: Tympanic membrane and ear canal normal    Nose: Mucosal edema present  Mouth/Throat: No oropharyngeal exudate  Oropharynx:  Erythema, no exudates, tonsils grade 1-2,   Eyes: Lids are normal  Right conjunctiva is injected  Left conjunctiva is not injected  Neck: Neck supple  Cardiovascular: Normal rate, regular rhythm and normal heart sounds  No murmur heard  Pulmonary/Chest: Effort normal  No respiratory distress  He has no wheezes  He has no rales  Musculoskeletal: Normal range of motion  Lymphadenopathy:     He has cervical adenopathy (Submandibular)  Neurological: He is alert and oriented to person, place, and time  Psychiatric: He has a normal mood and affect  His behavior is normal    Nursing note and vitals reviewed        Pertinent Laboratory/Diagnostic Studies:  Lab Results   Component Value Date    GLUCOSE 97 01/20/2014    BUN 16 03/25/2019    CREATININE 1 41 (H) 03/25/2019    CALCIUM 8 8 03/25/2019     12/21/2017    K 4 1 03/25/2019    CO2 27 03/25/2019     03/25/2019     Lab Results   Component Value Date    ALT 41 03/25/2019    AST 26 03/25/2019    ALKPHOS 49 03/25/2019    BILITOT 0 8 12/21/2017       Lab Results   Component Value Date    WBC 4 13 (L) 03/25/2019    HGB 15 0 03/25/2019    HCT 44 6 03/25/2019    MCV 89 03/25/2019     03/25/2019       Lab Results   Component Value Date    TSH 1 93 02/16/2019       Lab Results   Component Value Date    CHOL 158 07/15/2017     Lab Results   Component Value Date    TRIG 41 04/28/2018     Lab Results   Component Value Date    HDL 56 04/28/2018     Lab Results   Component Value Date    LDLCALC 101 01/20/2014     Lab Results   Component Value Date    HGBA1C 5 0 04/28/2018       Results for orders placed or performed in visit on 08/12/19   POCT urine dip auto non-scope   Result Value Ref Range     COLOR,UA yellow     CLARITY,UA clear     SPECIFIC GRAVITY,UA 1 015      PH,UA 6 0     LEUKOCYTE ESTERASE,UA neg     NITRITE,UA neg     GLUCOSE, UA neg     KETONES,UA neg     BILIRUBIN,UA neg     BLOOD,UA neg     POCT URINE PROTEIN neg     SL AMB POCT UROBILINOGEN 0 2        Orders Placed This Encounter   Procedures    XR chest pa & lateral       ALLERGIES:  Allergies   Allergen Reactions    Moxifloxacin Itching and Edema     Category: Allergy;     Lisinopril Rash    Valsartan Rash       Current Medications     Current Outpatient Medications   Medication Sig Dispense Refill    amLODIPine (NORVASC) 10 mg tablet TAKE ONE TABLET BY MOUTH EVERY DAY 90 tablet 1    Cholecalciferol (VITAMIN D3) 2000 units TABS Take 1 tablet by mouth daily        DIOVAN 160 MG tablet take 1 tablet by mouth once daily 30 tablet 0    Misc Natural Products (TURMERIC CURCUMIN) CAPS Take 1 capsule by mouth 2 (two) times a day 2400mg twice daily       Multiple Vitamins-Minerals (CENTRUM SILVER 50+MEN PO) Take by mouth      cefuroxime (CEFTIN) 500 mg tablet Take 1 tablet (500 mg total) by mouth 2 (two) times a day for 10 days 20 tablet 0    tobramycin (TOBREX) 0 3 % SOLN Administer 2 drops to both eyes every 4 (four) hours while awake 1 Bottle 0     No current facility-administered medications for this visit          Medications Discontinued During This Encounter   Medication Reason    alfuzosin (UROXATRAL) 10 mg 24 hr tablet Therapy completed    sildenafil (VIAGRA) 100 mg tablet Therapy completed       Health Maintenance     Health Maintenance   Topic Date Due    HIV Screening  12/07/1980    BMI: Followup Plan  12/07/1983    Annual Physical  12/07/1983    Influenza Vaccine  03/28/2020 (Originally 7/1/2019)    DTaP,Tdap,and Td Vaccines (1 - Tdap) 03/28/2020 (Originally 12/7/1976)    Depression Screening PHQ  07/02/2020    CRC Screening: Colonoscopy  12/23/2020    BMI: Adult  01/02/2021    Pneumococcal Vaccine: 65+ Years (1 of 2 - PCV13) 12/07/2030    Hepatitis C Screening  Completed    Pneumococcal Vaccine: Pediatrics (0 to 5 Years) and At-Risk Patients (6 to 59 Years)  Aged Out    HIB Vaccine  Aged Out    Hepatitis B Vaccine  Aged Out    IPV Vaccine  Aged Out    Hepatitis A Vaccine  Aged Out    Meningococcal ACWY Vaccine  Aged Out    HPV Vaccine  Aged [employer] History   Administered Date(s) Administered    Influenza Quadrivalent Preservative Free 3 years and older IM 10/20/2017    Influenza TIV (IM) 08/20/2016       Radha Mcmanus MD

## 2020-02-13 ENCOUNTER — OFFICE VISIT (OUTPATIENT)
Dept: FAMILY MEDICINE CLINIC | Facility: CLINIC | Age: 55
End: 2020-02-13
Payer: COMMERCIAL

## 2020-02-13 VITALS
WEIGHT: 203.4 LBS | OXYGEN SATURATION: 97 % | RESPIRATION RATE: 16 BRPM | HEART RATE: 67 BPM | BODY MASS INDEX: 30.83 KG/M2 | HEIGHT: 68 IN | DIASTOLIC BLOOD PRESSURE: 82 MMHG | TEMPERATURE: 97.8 F | SYSTOLIC BLOOD PRESSURE: 126 MMHG

## 2020-02-13 DIAGNOSIS — R05.9 COUGH IN ADULT PATIENT: ICD-10-CM

## 2020-02-13 DIAGNOSIS — Z00.00 ENCOUNTER FOR HEALTH MAINTENANCE EXAMINATION IN ADULT: Primary | ICD-10-CM

## 2020-02-13 DIAGNOSIS — R97.20 ELEVATED PROSTATE SPECIFIC ANTIGEN (PSA): ICD-10-CM

## 2020-02-13 DIAGNOSIS — K64.5 HEMORRHOIDS, EXTERNAL, THROMBOSED: ICD-10-CM

## 2020-02-13 DIAGNOSIS — I10 ESSENTIAL HYPERTENSION: ICD-10-CM

## 2020-02-13 DIAGNOSIS — D17.71 ANGIOMYOLIPOMA OF KIDNEY: ICD-10-CM

## 2020-02-13 DIAGNOSIS — K21.9 CHRONIC GERD: ICD-10-CM

## 2020-02-13 PROCEDURE — 99396 PREV VISIT EST AGE 40-64: CPT | Performed by: FAMILY MEDICINE

## 2020-02-13 RX ORDER — GUAIFENESIN AND CODEINE PHOSPHATE 100; 10 MG/5ML; MG/5ML
SOLUTION ORAL
Qty: 240 ML | Refills: 0 | Status: SHIPPED | OUTPATIENT
Start: 2020-02-13 | End: 2020-03-02 | Stop reason: ALTCHOICE

## 2020-02-13 RX ORDER — FAMOTIDINE 20 MG/1
TABLET, FILM COATED ORAL
Qty: 30 TABLET | Refills: 2 | Status: SHIPPED | OUTPATIENT
Start: 2020-02-13 | End: 2020-03-02 | Stop reason: ALTCHOICE

## 2020-02-13 NOTE — ASSESSMENT & PLAN NOTE
Normal renal  US 3/2019   No evidence of angiomyolipoma  Simple renal cysts left  [facility] urology  follows

## 2020-02-13 NOTE — PROGRESS NOTES
FAMILY PRACTICE OFFICE VISIT       NAME: Heide Kearney  AGE: 47 y o  SEX: male       : 1965        MRN: [id number]        Assessment and Plan     Problem List Items Addressed This Visit        Digestive    Hemorrhoids, external, thrombosed     · Hemorrhoid surgery by Dr Haynes  · Pending colonoscopy in March            Cardiovascular and Mediastinum    Essential hypertension     · Blood pressure is well controlled  · Continue regimen of Norvasc 10 mg once a day and Diovan 160 mg daily  · Patient remains under care of Nephrology at [address] [place] [address]  · Normalized GFR, normal Cystatin C            Genitourinary    Angiomyolipoma of kidney     Normal renal  US 3/2019   No evidence of angiomyolipoma  Simple renal cysts left   urology  follows            Other    Elevated prostate specific antigen (PSA)     · Patient remains under care of [facility] and [facility]  · He is being followed with PSA and prostate MRI  · Previous biopsy was negative  Other Visit Diagnoses     Encounter for health maintenance examination in adult    -  Primary    Cough in adult patient        Relevant Medications    guaifenesin-codeine (GUAIFENESIN AC) 100-10 MG/5ML liquid    Chronic GERD        Relevant Medications    famotidine (PEPCID) 20 mg tablet        Patient presents for annual well exam   Residual symptoms of URI, overall improved  He is nontoxic and afebrile, lung exam is clear, patient has completed 7 days of Ceftin  He will use Robitussin with codeine at night for residual symptoms of cough and will contact me if cold and cough symptoms are persisting or if he develops any recurrence of fever, chest tightness or wheezing  Patient will continue medications for hypertension as outlined above  He remains under ongoing care of Nephrology at [address] [place] [address]    Patient follows up with [facility] and [facility] team for surveillance of elevated PSA  He is being monitored with prostate MRI, previous prostate biopsy was negative  Patient is scheduled for colonoscopy in March  He also reports intermittent symptoms of acid reflux and has noticed improvement with over-the-counter Pepcid  I explained that symptoms of LPR could be contributing to lingering nighttime cough  Will start him on Pepcid 20 mg q h s   I advised patient to consider EGD along with colonoscopy in March, he will discuss it with GI team   Routine blood work including CBC, CMP, TSH and lipid panel was ordered and pending  Follow-up annually and as needed  BMI Counseling: Body mass index is 31 39 kg/m²  The BMI is above normal  Nutrition recommendations include encouraging healthy choices of fruits and vegetables, consuming healthier snacks, moderation in carbohydrate intake and reducing intake of cholesterol  Exercise recommendations include moderate physical activity 150 minutes/week  There are no Patient Instructions on file for this visit  Discussed with the patient and all questioned fully answered  He will call me if any problems arise  M*Modal software was used to dictate this note  It may contain errors with dictating incorrect words/spelling  Please contact provider directly with any questions  Chief Complaint     Chief Complaint   Patient presents with    Annual Exam       History of Present Illness       Annual well exam    Still coughing at night, patient was treated for symptoms of URI over a week ago  He completed course of antibiotic, Ceftin for 7 days  Patient denies recurrences of fever  No severe nasal congestion or sinus pressure  He denies chest tightness, wheezing or dyspnea  He is still experiencing intermittent symptoms of cough at night, overall improved   No colored mucus expectoration  Patient is requesting p r n  Remedy for cough symptoms at night        Intermittent s/o  GERD lately  Started pepcid  OTC- Helped quite a bit  Pending colonoscopy  - March, Dr Schreiber  History of elevated creatinine and hypertension  Patient is managing well on regimen of Diovan 160 mg daily and Norvasc 10 mg daily  Blood pressures have been well controlled  Patient denies excessive mouth dryness or leg edema  He has been under ongoing care of Nephrology at [address] [place] [address]  Results of recent blood work reviewed   CYSTATIN C 0 52 - 1 23 mg/L  0   94   EGFR Result: 80       Dr Maradiaga / [facility] - PSA is followed    Dr Werner-  Nephrology- [facility]      Sees nutritionist     Low carb diet , patient is exercising on a regular basis, denies any exertional symptoms                Review of Systems   Review of Systems   Constitutional: Negative  HENT: Positive for postnasal drip  Eyes: Negative  Respiratory: Positive for cough  Negative for chest tightness, shortness of breath and wheezing  Gastrointestinal: Negative  Intermittent symptoms of acid reflux   Endocrine: Negative  Genitourinary: Negative  Musculoskeletal: Negative  Skin: Negative  Allergic/Immunologic: Negative  Neurological: Negative  Hematological: Negative  Psychiatric/Behavioral: Negative          Active Problem List     Patient Active Problem List   Diagnosis    Essential hypertension    Myofascial pain syndrome    CKD (chronic kidney disease) stage 3, GFR 30-59 ml/min (HCC)    BPH with obstruction/lower urinary tract symptoms    Elevated prostate specific antigen (PSA)    Angiomyolipoma of kidney    Mixed erectile dysfunction    Impingement syndrome of right shoulder    Thrombocytopenia (HCC)    Urticaria    Hemorrhoids, external, thrombosed    Impingement syndrome of left shoulder    Subacromial bursitis of right shoulder joint    Acute pain of right shoulder    Papilloma of palate       Past Medical History:  Past Medical History:   Diagnosis Date    Benign prostatic hyperplasia     Herpes simplex     RESOLVED [id number]    Hypertension     Renal disorder        Past Surgical History:  Past Surgical History:   Procedure Laterality Date    [facility]    PROSTATE BIOPSY         Family History:  Family History   Problem Relation Age of Onset    Other Mother         CABG    Diabetes Mother     Hypertension Mother     Hypertension Father     Kidney cancer Family     Prostate cancer Family     Hypertension Brother     Hypertension Sister     Celiac disease Brother     No Known Problems Son     Eczema Daughter        Social History:  Social History     Socioeconomic History    Marital status: /Civil Union     Spouse name: Ivana     Number of children: 2    Years of education: Not on file    Highest education level: Not on file   Occupational History    Occupation: /[employer]   Social Needs    Financial resource strain: Not on file    Food insecurity:     Worry: Not on file     Inability: Not on file    Transportation needs:     Medical: Not on file     Non-medical: Not on file   Tobacco Use    Smoking status: Never Smoker    Smokeless tobacco: Never Used   Substance and Sexual Activity    Alcohol use: No    Drug use: No    Sexual activity: Not on file   Lifestyle    Physical activity:     Days per week: 6 days     Minutes per session: 80 min    Stress: Not on file   Relationships    Social connections:     Talks on phone: Not on file     Gets together: Not on file     Attends Sikhism service: Not on file     Active member of club or organization: Not on file     Attends meetings of clubs or organizations: Not on file     Relationship status: Not on file    Intimate partner violence:     Fear of current or ex partner: Not on file     Emotionally abused: Not on file     Physically abused: Not on file     Forced sexual activity: Not on file   Other Topics Concern    Not on file   Social History Narrative    Not on file Objective     Vitals:    02/13/20 0730   BP: 126/82   BP Location: Left arm   Patient Position: Sitting   Cuff Size: Adult   Pulse: 67   Resp: 16   Temp: 97 8 °F (36 6 °C)   TempSrc: Tympanic   SpO2: 97%   Weight: 92 3 kg (203 lb 6 4 oz)   Height: 5' 7 5" (1 715 m)     Wt Readings from Last 3 Encounters:   02/13/20 92 3 kg (203 lb 6 4 oz)   02/05/20 92 5 kg (204 lb)   01/02/20 88 9 kg (196 lb)       Physical Exam   Constitutional: He is oriented to person, place, and time  He appears well-developed and well-nourished  HENT:   Head: Normocephalic and atraumatic  Oropharynx:  Mild erythema, no exudates   Eyes: Conjunctivae are normal    Neck: Neck supple  No JVD present  Carotid bruit is not present  No thyromegaly present  Cardiovascular: Normal rate, regular rhythm and normal heart sounds  No murmur heard  Pulmonary/Chest: Effort normal and breath sounds normal  No respiratory distress  He has no wheezes  He has no rales  Abdominal: Soft  Bowel sounds are normal  He exhibits no distension and no abdominal bruit  Musculoskeletal: Normal range of motion  He exhibits no edema  Neurological: He is alert and oriented to person, place, and time  No cranial nerve deficit  Psychiatric: He has a normal mood and affect  His behavior is normal    Nursing note and vitals reviewed        Pertinent Laboratory/Diagnostic Studies:  Lab Results   Component Value Date    GLUCOSE 97 01/20/2014    BUN 16 03/25/2019    CREATININE 1 41 (H) 03/25/2019    CALCIUM 8 8 03/25/2019     12/21/2017    K 4 1 03/25/2019    CO2 27 03/25/2019     03/25/2019     Lab Results   Component Value Date    ALT 41 03/25/2019    AST 26 03/25/2019    ALKPHOS 49 03/25/2019    BILITOT 0 8 12/21/2017       Lab Results   Component Value Date    WBC 4 13 (L) 03/25/2019    HGB 15 0 03/25/2019    HCT 44 6 03/25/2019    MCV 89 03/25/2019     03/25/2019       Lab Results   Component Value Date    TSH 1 93 02/16/2019 Lab Results   Component Value Date    CHOL 158 07/15/2017     Lab Results   Component Value Date    TRIG 41 04/28/2018     Lab Results   Component Value Date    HDL 56 04/28/2018     Lab Results   Component Value Date    LDLCALC 101 01/20/2014     Lab Results   Component Value Date    HGBA1C 5 0 04/28/2018       Results for orders placed or performed in visit on 08/12/19   POCT urine dip auto non-scope   Result Value Ref Range     COLOR,UA yellow     CLARITY,UA clear     SPECIFIC GRAVITY,UA 1 015      PH,UA 6 0     LEUKOCYTE ESTERASE,UA neg     NITRITE,UA neg     GLUCOSE, UA neg     KETONES,UA neg     BILIRUBIN,UA neg     BLOOD,UA neg     POCT URINE PROTEIN neg     SL AMB POCT UROBILINOGEN 0 2        No orders of the defined types were placed in this encounter  ALLERGIES:  Allergies   Allergen Reactions    Moxifloxacin Itching and Edema     Category: Allergy;     Lisinopril Rash    Valsartan Rash       Current Medications     Current Outpatient Medications   Medication Sig Dispense Refill    amLODIPine (NORVASC) 10 mg tablet TAKE ONE TABLET BY MOUTH EVERY DAY 90 tablet 1    Cholecalciferol (VITAMIN D3) 2000 units TABS Take 1 tablet by mouth daily        DIOVAN 160 MG tablet take 1 tablet by mouth once daily 30 tablet 0    Misc Natural Products (TURMERIC CURCUMIN) CAPS Take 1 capsule by mouth 2 (two) times a day 2400mg twice daily       Multiple Vitamins-Minerals (CENTRUM SILVER 50+MEN PO) Take by mouth      famotidine (PEPCID) 20 mg tablet 1 tablet  at bedtime PRN : acid reflux symptoms 30 tablet 2    guaifenesin-codeine (GUAIFENESIN AC) 100-10 MG/5ML liquid Take 5-10 mL every 4-6 hours as needed for cough 240 mL 0     No current facility-administered medications for this visit          Medications Discontinued During This Encounter   Medication Reason    cefuroxime (CEFTIN) 500 mg tablet     tobramycin (TOBREX) 0 3 % SOLN        Health Maintenance     Health Maintenance   Topic Date Due    HIV Screening  12/07/1980    BMI: Followup Plan  12/07/1983    Annual Physical  12/07/1983    Influenza Vaccine  03/28/2020 (Originally 7/1/2019)    DTaP,Tdap,and Td Vaccines (1 - Tdap) 03/28/2020 (Originally 12/7/1976)    Depression Screening PHQ  07/02/2020    CRC Screening: Colonoscopy  12/23/2020    BMI: Adult  02/13/2021    Pneumococcal Vaccine: 65+ Years (1 of 2 - PCV13) 12/07/2030    Hepatitis C Screening  Completed    Pneumococcal Vaccine: Pediatrics (0 to 5 Years) and At-Risk Patients (6 to 59 Years)  Aged Out    HIB Vaccine  Aged Out    Hepatitis B Vaccine  Aged Out    IPV Vaccine  Aged Out    Hepatitis A Vaccine  Aged Out    Meningococcal ACWY Vaccine  Aged Out    HPV Vaccine  Aged [employer] History   Administered Date(s) Administered    Influenza Quadrivalent Preservative Free 3 years and older IM 10/20/2017    Influenza TIV (IM) 08/20/2016       Delmis Lugo MD

## 2020-02-18 ENCOUNTER — TELEPHONE (OUTPATIENT)
Dept: FAMILY MEDICINE CLINIC | Facility: CLINIC | Age: 55
End: 2020-02-18

## 2020-02-18 DIAGNOSIS — R50.9 RESPIRATORY ILLNESS WITH FEVER: Primary | ICD-10-CM

## 2020-02-18 DIAGNOSIS — J98.9 RESPIRATORY ILLNESS WITH FEVER: Primary | ICD-10-CM

## 2020-02-18 RX ORDER — AZITHROMYCIN 500 MG/1
500 TABLET, FILM COATED ORAL DAILY
Qty: 5 TABLET | Refills: 0 | Status: SHIPPED | OUTPATIENT
Start: 2020-02-18 | End: 2020-02-23

## 2020-02-18 NOTE — TELEPHONE ENCOUNTER
I will send prescription for Zithromax for 5 days  Please advise patient to proceed with chest x-ray that was ordered few weeks ago, orders are in system  I would be happy to see patient for follow-up office visit within next few days if needed    Thank you

## 2020-02-18 NOTE — ASSESSMENT & PLAN NOTE
· Patient remains under care of [facility]  · He is being followed with PSA and prostate MRI  · Previous biopsy was negative

## 2020-02-18 NOTE — ASSESSMENT & PLAN NOTE
· Blood pressure is well controlled  · Continue regimen of Norvasc 10 mg once a day and Diovan 160 mg daily  · Patient remains under care of Nephrology at [address] [place] [address]    · Normalized GFR, normal Cystatin C

## 2020-02-18 NOTE — TELEPHONE ENCOUNTER
Patient called and stated that he is not feeling any better and is feeling worse  Sinuses are red and green, cough is really bad at night, having some chills  Is there anything else that you can prescribe him or for him to try?   Please call to advise on what he should do at this time

## 2020-02-22 ENCOUNTER — TRANSCRIBE ORDERS (OUTPATIENT)
Dept: RADIOLOGY | Facility: HOSPITAL | Age: 55
End: 2020-02-22

## 2020-02-22 ENCOUNTER — HOSPITAL ENCOUNTER (OUTPATIENT)
Dept: RADIOLOGY | Facility: HOSPITAL | Age: 55
Discharge: HOME | End: 2020-02-22
Payer: COMMERCIAL

## 2020-02-22 DIAGNOSIS — T15.90XD FOREIGN BODY IN EYE, UNSPECIFIED LATERALITY, SUBSEQUENT ENCOUNTER: Primary | ICD-10-CM

## 2020-02-22 DIAGNOSIS — R50.9 RESPIRATORY ILLNESS WITH FEVER: ICD-10-CM

## 2020-02-22 DIAGNOSIS — J98.9 RESPIRATORY ILLNESS WITH FEVER: ICD-10-CM

## 2020-02-22 PROCEDURE — 71046 X-RAY EXAM CHEST 2 VIEWS: CPT

## 2020-02-24 ENCOUNTER — TELEPHONE (OUTPATIENT)
Dept: FAMILY MEDICINE CLINIC | Facility: CLINIC | Age: 55
End: 2020-02-24

## 2020-02-24 DIAGNOSIS — I10 ESSENTIAL HYPERTENSION: ICD-10-CM

## 2020-02-24 DIAGNOSIS — R50.9 RESPIRATORY ILLNESS WITH FEVER: Primary | ICD-10-CM

## 2020-02-24 DIAGNOSIS — J98.9 RESPIRATORY ILLNESS WITH FEVER: Primary | ICD-10-CM

## 2020-02-24 RX ORDER — SULFAMETHOXAZOLE AND TRIMETHOPRIM 800; 160 MG/1; MG/1
1 TABLET ORAL 2 TIMES DAILY
Qty: 14 TABLET | Refills: 0 | Status: SHIPPED | OUTPATIENT
Start: 2020-02-24 | End: 2020-03-02 | Stop reason: ALTCHOICE

## 2020-02-24 NOTE — TELEPHONE ENCOUNTER
We can try him on 1 week course of Bactrim  Please advise patient to drink plenty of fluids to hydrate himself well  I suspect his symptoms are likely triggered by persistent sinusitis  I will send prescription to the pharmacy    Thank you

## 2020-02-25 LAB
ALBUMIN SERPL-MCNC: 4 G/DL (ref 3.6–5.1)
ALBUMIN/GLOB SERPL: 1.3 (CALC) (ref 1–2.5)
ALP SERPL-CCNC: 52 U/L (ref 35–144)
ALT SERPL-CCNC: 37 U/L (ref 9–46)
AST SERPL-CCNC: 26 U/L (ref 10–35)
BASOPHILS # BLD AUTO: 29 CELLS/UL (ref 0–200)
BASOPHILS NFR BLD AUTO: 0.5 %
BILIRUB SERPL-MCNC: 0.7 MG/DL (ref 0.2–1.2)
BUN SERPL-MCNC: 22 MG/DL (ref 7–25)
BUN/CREAT SERPL: 14 (CALC) (ref 6–22)
CALCIUM SERPL-MCNC: 9.6 MG/DL (ref 8.6–10.3)
CHLORIDE SERPL-SCNC: 102 MMOL/L (ref 98–110)
CHOLEST SERPL-MCNC: 147 MG/DL
CHOLEST/HDLC SERPL: 2.8 (CALC)
CO2 SERPL-SCNC: 28 MMOL/L (ref 20–32)
CREAT SERPL-MCNC: 1.52 MG/DL (ref 0.7–1.33)
EOSINOPHIL # BLD AUTO: 99 CELLS/UL (ref 15–500)
EOSINOPHIL NFR BLD AUTO: 1.7 %
ERYTHROCYTE [DISTWIDTH] IN BLOOD BY AUTOMATED COUNT: 12.1 % (ref 11–15)
GLOBULIN SER CALC-MCNC: 3.1 G/DL (CALC) (ref 1.9–3.7)
GLUCOSE SERPL-MCNC: 100 MG/DL (ref 65–99)
HCT VFR BLD AUTO: 45.1 % (ref 38.5–50)
HDLC SERPL-MCNC: 52 MG/DL
HGB BLD-MCNC: 14.7 G/DL (ref 13.2–17.1)
LDLC SERPL CALC-MCNC: 83 MG/DL (CALC)
LYMPHOCYTES # BLD AUTO: 1235 CELLS/UL (ref 850–3900)
LYMPHOCYTES NFR BLD AUTO: 21.3 %
MCH RBC QN AUTO: 28.9 PG (ref 27–33)
MCHC RBC AUTO-ENTMCNC: 32.6 G/DL (ref 32–36)
MCV RBC AUTO: 88.8 FL (ref 80–100)
MONOCYTES # BLD AUTO: 1102 CELLS/UL (ref 200–950)
MONOCYTES NFR BLD AUTO: 19 %
NEUTROPHILS # BLD AUTO: 3335 CELLS/UL (ref 1500–7800)
NEUTROPHILS NFR BLD AUTO: 57.5 %
NONHDLC SERPL-MCNC: 95 MG/DL (CALC)
PLATELET # BLD AUTO: 110 THOUSAND/UL (ref 140–400)
PMV BLD REES-ECKER: 12.6 FL (ref 7.5–12.5)
POTASSIUM SERPL-SCNC: 4.3 MMOL/L (ref 3.5–5.3)
PROT SERPL-MCNC: 7.1 G/DL (ref 6.1–8.1)
RBC # BLD AUTO: 5.08 MILLION/UL (ref 4.2–5.8)
SL AMB EGFR AFRICAN AMERICAN: 59 ML/MIN/1.73M2
SL AMB EGFR NON AFRICAN AMERICAN: 51 ML/MIN/1.73M2
SODIUM SERPL-SCNC: 137 MMOL/L (ref 135–146)
TRIGL SERPL-MCNC: 43 MG/DL
TSH SERPL-ACNC: 2.52 MIU/L (ref 0.4–4.5)
WBC # BLD AUTO: 5.8 THOUSAND/UL (ref 3.8–10.8)

## 2020-02-25 RX ORDER — VALSARTAN 160 MG/1
TABLET ORAL
Qty: 30 TABLET | Refills: 0 | Status: SHIPPED | OUTPATIENT
Start: 2020-02-25 | End: 2020-03-02 | Stop reason: SDUPTHER

## 2020-03-02 ENCOUNTER — OFFICE VISIT (OUTPATIENT)
Dept: FAMILY MEDICINE CLINIC | Facility: CLINIC | Age: 55
End: 2020-03-02
Payer: COMMERCIAL

## 2020-03-02 VITALS
DIASTOLIC BLOOD PRESSURE: 80 MMHG | HEART RATE: 87 BPM | TEMPERATURE: 97.8 F | HEIGHT: 68 IN | SYSTOLIC BLOOD PRESSURE: 128 MMHG | WEIGHT: 203.4 LBS | RESPIRATION RATE: 16 BRPM | BODY MASS INDEX: 30.83 KG/M2 | OXYGEN SATURATION: 99 %

## 2020-03-02 DIAGNOSIS — N18.30 CKD (CHRONIC KIDNEY DISEASE) STAGE 3, GFR 30-59 ML/MIN (HCC): ICD-10-CM

## 2020-03-02 DIAGNOSIS — I10 ESSENTIAL HYPERTENSION: ICD-10-CM

## 2020-03-02 DIAGNOSIS — I10 ESSENTIAL HYPERTENSION: Primary | ICD-10-CM

## 2020-03-02 DIAGNOSIS — M75.42 IMPINGEMENT SYNDROME OF LEFT SHOULDER: ICD-10-CM

## 2020-03-02 DIAGNOSIS — R09.89 SYMPTOMS OF UPPER RESPIRATORY INFECTION (URI): ICD-10-CM

## 2020-03-02 DIAGNOSIS — D69.6 THROMBOCYTOPENIA (HCC): ICD-10-CM

## 2020-03-02 PROCEDURE — 3079F DIAST BP 80-89 MM HG: CPT | Performed by: FAMILY MEDICINE

## 2020-03-02 PROCEDURE — 99214 OFFICE O/P EST MOD 30 MIN: CPT | Performed by: FAMILY MEDICINE

## 2020-03-02 PROCEDURE — 1036F TOBACCO NON-USER: CPT | Performed by: FAMILY MEDICINE

## 2020-03-02 PROCEDURE — 3074F SYST BP LT 130 MM HG: CPT | Performed by: FAMILY MEDICINE

## 2020-03-02 RX ORDER — OSELTAMIVIR PHOSPHATE 75 MG/1
75 CAPSULE ORAL 2 TIMES DAILY
Qty: 10 CAPSULE | Refills: 0 | Status: SHIPPED | OUTPATIENT
Start: 2020-03-02 | End: 2020-03-07

## 2020-03-02 RX ORDER — VALSARTAN 160 MG/1
160 TABLET ORAL DAILY
Qty: 90 TABLET | Refills: 1 | Status: SHIPPED | OUTPATIENT
Start: 2020-03-02 | End: 2020-09-11

## 2020-03-02 RX ORDER — VALSARTAN 160 MG/1
160 TABLET ORAL DAILY
Qty: 90 TABLET | Refills: 1 | Status: SHIPPED | OUTPATIENT
Start: 2020-03-02 | End: 2020-03-02

## 2020-03-02 RX ORDER — AZITHROMYCIN 500 MG/1
500 TABLET, FILM COATED ORAL DAILY
Qty: 5 TABLET | Refills: 0 | Status: SHIPPED | OUTPATIENT
Start: 2020-03-02 | End: 2020-03-07

## 2020-03-02 RX ORDER — VALSARTAN 160 MG/1
160 TABLET ORAL DAILY
Qty: 30 TABLET | Refills: 1 | Status: SHIPPED | OUTPATIENT
Start: 2020-03-02 | End: 2020-03-02 | Stop reason: SDUPTHER

## 2020-03-02 NOTE — PROGRESS NOTES
FAMILY PRACTICE OFFICE VISIT       NAME: Wes Santizo  AGE: 47 y o  SEX: male       : 1965        MRN: [id number]        Assessment and Plan     Problem List Items Addressed This Visit        Cardiovascular and Mediastinum    Essential hypertension - Primary     · Blood pressure is well controlled on regimen of Diovan 160 mg daily and amlodipine 10 mg daily  · Patient was evaluated by Cardiology and Nephrology in the past and is currently under care of Nephrology at [address] [place] [address]  · He presented with intermittent elevation of creatinine and normal Cystatin C   · Was felt that patient's creatinine is likely elevated due to muscle mass and routine intense workouts  · Recent blood work reveals creatinine 1 53   · Patient will repeat labs in 2-3 weeks with no workouts 48 hours prior to blood work  Relevant Medications    DIOVAN 160 MG tablet    Other Relevant Orders    Basic metabolic panel    Specimen draw for platelet clumping       Genitourinary    CKD (chronic kidney disease) stage 3, GFR 30-59 ml/min ([facility])     · Renal ultrasound 2015:  No medical renal disease  · Renal artery scan :  No evidence of JOSE  · Nephrology believes that mild intermittent elevation of creatinine is likely related to muscle mass, will continue GFR monitoring            Other    Thrombocytopenia ([facility])     · Chronic mild thrombocytopenia    · Recent blood work reveals slightly decreased platelet count 128,578, lower than patient's previous value year ago 132,000, fluctuation could be related to recent illness, we will follow and reassess         Relevant Orders    CBC and differential    Impingement syndrome of left shoulder     Referral to ortho  Symptoms of impingement syndrome         Relevant Orders    Ambulatory referral to Physical Medicine Rehab      Other Visit Diagnoses     Symptoms of upper respiratory infection (URI)        Relevant Medications    oseltamivir (TAMIFLU) 75 mg with any questions  Chief Complaint     Chief Complaint   Patient presents with    Follow-up     Pt is here for f/u sinus, cough, and blood work       History of Present Illness     Patient presents for follow-up and review blood work results  Cold symptoms have improved significantly, patient states that he is feeling 85% better  He denies symptoms of fever, sore throat, chest tightness or wheezing  He still has residual postnasal drip and cough mainly in the morning  Patient was treated with 3 different courses of antibiotics including Cefzil, Zithromax and Bactrim  Chest x-ray was normal   Patient is potentially traveling to [facility] again in 2 weeks and is here for recheck  He denies symptoms of sore throat or earache  Results of recent blood work reviewed  Creatinine is slightly elevated at 1 53  Patient with history of fluctuating creatinine and is under ongoing care of Nephrology at [address] [place] [address]  He Cystatin C was repetitively normal   Patient will be repeating blood work including CBC with diff to reassess mildly decreased platelet count and BMP in a few weeks, I requested him not to fast     Patient is experiencing recurrent excessive tearing right eye  No symptoms of pinkeye  No ocular discharge or visual changes  No eye pain  I advised him to schedule evaluation by Ophthalmology  Chronic left shoulder pain  Patient was evaluated with x-ray back in May of 2019 revealing arthritic changes in [facility] [place] by [facility]  He is scheduled for consultation with Orthopedic surgery, Dr Hi Vasquez  Patient has completed physical therapy without improvement  He was treated in the past with corticosteroid injections that have provided on short-term relief lasting few days  Patient is requesting prescriptions for Tamiflu and Zithromax to keep on hand as he is scheduled to travel to [place] in mid March      Review of Systems   Review of Systems   Constitutional: Negative  HENT: Positive for congestion and postnasal drip  Eyes: Negative for photophobia, discharge, redness and visual disturbance  Excessive tearing right eye, as outlined in HPI   Respiratory: Positive for cough (Improved significantly, due to postnasal drip, in the morning)  Cardiovascular: Negative  Gastrointestinal: Negative  Endocrine: Negative  Genitourinary: Negative  Musculoskeletal: Positive for arthralgias  Bilateral shoulder pain, left shoulder worse than right   Skin: Negative  Allergic/Immunologic: Negative  Neurological: Negative  Hematological: Negative  Psychiatric/Behavioral: Negative          Active Problem List     Patient Active Problem List   Diagnosis    Essential hypertension    Myofascial pain syndrome    CKD (chronic kidney disease) stage 3, GFR 30-59 ml/min ([facility]    BPH with obstruction/lower urinary tract symptoms    Elevated prostate specific antigen (PSA)    Angiomyolipoma of kidney    Mixed erectile dysfunction    Impingement syndrome of right shoulder    Thrombocytopenia ([facility])    Urticaria    Hemorrhoids, external, thrombosed    Impingement syndrome of left shoulder    Subacromial bursitis of right shoulder joint    Acute pain of right shoulder    Papilloma of palate       Past Medical History:  Past Medical History:   Diagnosis Date    Benign prostatic hyperplasia     Herpes simplex     RESOLVED [id number]    Hypertension     Renal disorder        Past Surgical History:  Past Surgical History:   Procedure Laterality Date    COLONOSCOPY  12/23/2015     [facility]    PROSTATE BIOPSY         Family History:  Family History   Problem Relation Age of Onset    Other Mother         CABG    Diabetes Mother     Hypertension Mother     Hypertension Father     Kidney cancer Family     Prostate cancer Family     Hypertension Brother     Hypertension Sister     Celiac disease Brother     No Known Problems Son     Eczema Daughter        Social History:  Social History     Socioeconomic History    Marital status: /Civil Union     Spouse name: Ekta Lezama Number of children: 2    Years of education: Not on file    Highest education level: Not on file   Occupational History    Occupation: /[employer]   Social Needs    Financial resource strain: Not on file    Food insecurity:     Worry: Not on file     Inability: Not on file    Transportation needs:     Medical: Not on file     Non-medical: Not on file   Tobacco Use    Smoking status: Never Smoker    Smokeless tobacco: Never Used   Substance and Sexual Activity    Alcohol use: No    Drug use: No    Sexual activity: Not on file   Lifestyle    Physical activity:     Days per week: 6 days     Minutes per session: 80 min    Stress: Not on file   Relationships    Social connections:     Talks on phone: Not on file     Gets together: Not on file     Attends Tenriism service: Not on file     Active member of club or organization: Not on file     Attends meetings of clubs or organizations: Not on file     Relationship status: Not on file    Intimate partner violence:     Fear of current or ex partner: Not on file     Emotionally abused: Not on file     Physically abused: Not on file     Forced sexual activity: Not on file   Other Topics Concern    Not on file   Social History Narrative    Not on file           Objective     Vitals:    03/02/20 0729   BP: 128/80   Pulse: 87   Resp: 16   Temp: 97 8 °F (36 6 °C)   TempSrc: Tympanic   SpO2: 99%   Weight: 92 3 kg (203 lb 6 4 oz)   Height: 5' 7 5" (1 715 m)     Wt Readings from Last 3 Encounters:   03/02/20 92 3 kg (203 lb 6 4 oz)   02/13/20 92 3 kg (203 lb 6 4 oz)   02/05/20 92 5 kg (204 lb)       Physical Exam   Constitutional: He is oriented to person, place, and time  He appears well-developed and well-nourished  HENT:   Head: Normocephalic and atraumatic     Right Ear: Tympanic membrane and external ear normal    Left Ear: Tympanic membrane and external ear normal    Mouth/Throat: No posterior oropharyngeal erythema  Eyes: Conjunctivae and EOM are normal  Right eye exhibits no discharge  Left eye exhibits no discharge  Excessive tearing right eye on exam   Neck: Neck supple  Carotid bruit is not present  Cardiovascular: Normal rate, regular rhythm and normal heart sounds  No murmur heard  Pulmonary/Chest: Effort normal and breath sounds normal  No respiratory distress  He has no wheezes  He has no rales  Abdominal: Bowel sounds are normal  He exhibits no distension and no abdominal bruit  Musculoskeletal: Normal range of motion  He exhibits no edema  Left shoulder:  Decreased range of motion with pain at  degrees with abduction and extension   Neurological: He is alert and oriented to person, place, and time  No cranial nerve deficit  Psychiatric: He has a normal mood and affect  His behavior is normal    Nursing note and vitals reviewed        Pertinent Laboratory/Diagnostic Studies:  Lab Results   Component Value Date    GLUCOSE 97 01/20/2014    BUN 22 02/24/2020    CREATININE 1 52 (H) 02/24/2020    CALCIUM 9 6 02/24/2020     12/21/2017    K 4 3 02/24/2020    CO2 28 02/24/2020     02/24/2020     Lab Results   Component Value Date    ALT 37 02/24/2020    AST 26 02/24/2020    ALKPHOS 52 02/24/2020    BILITOT 0 8 12/21/2017       Lab Results   Component Value Date    WBC 5 8 02/24/2020    HGB 14 7 02/24/2020    HCT 45 1 02/24/2020    MCV 88 8 02/24/2020     (L) 02/24/2020       Lab Results   Component Value Date    TSH 2 52 02/24/2020       Lab Results   Component Value Date    CHOL 158 07/15/2017     Lab Results   Component Value Date    TRIG 43 02/24/2020     Lab Results   Component Value Date    HDL 52 02/24/2020     Lab Results   Component Value Date    LDLCALC 101 01/20/2014     Lab Results   Component Value Date    HGBA1C 5 0 04/28/2018       Results for orders placed or performed in visit on 02/24/20   Lipid panel   Result Value Ref Range    Total Cholesterol 147 <200 mg/dL    HDL 52 > OR = 40 mg/dL    Triglycerides 43 <150 mg/dL    LDL Direct 83 mg/dL (calc)    Chol HDLC Ratio 2 8 <5 0 (calc)    Non-HDL Cholesterol 95 <130 mg/dL (calc)   Comprehensive metabolic panel   Result Value Ref Range    Glucose, Random 100 (H) 65 - 99 mg/dL    BUN 22 7 - 25 mg/dL    Creatinine 1 52 (H) 0 70 - 1 33 mg/dL    eGFR Non  51 (L) > OR = 60 mL/min/1 73m2    eGFR  59 (L) > OR = 60 mL/min/1 73m2    SL AMB BUN/CREATININE RATIO 14 6 - 22 (calc)    Sodium 137 135 - 146 mmol/L    Potassium 4 3 3 5 - 5 3 mmol/L    Chloride 102 98 - 110 mmol/L    CO2 28 20 - 32 mmol/L    Calcium 9 6 8 6 - 10 3 mg/dL    Protein, Total 7 1 6 1 - 8 1 g/dL    Albumin 4 0 3 6 - 5 1 g/dL    Globulin 3 1 1 9 - 3 7 g/dL (calc)    Albumin/Globulin Ratio 1 3 1 0 - 2 5 (calc)    TOTAL BILIRUBIN 0 7 0 2 - 1 2 mg/dL    Alkaline Phosphatase 52 35 - 144 U/L    AST 26 10 - 35 U/L    ALT 37 9 - 46 U/L   CBC and differential   Result Value Ref Range    White Blood Cell Count 5 8 3 8 - 10 8 Thousand/uL    Red Blood Cell Count 5 08 4 20 - 5 80 Million/uL    Hemoglobin 14 7 13 2 - 17 1 g/dL    HCT 45 1 38 5 - 50 0 %    MCV 88 8 80 0 - 100 0 fL    MCH 28 9 27 0 - 33 0 pg    MCHC 32 6 32 0 - 36 0 g/dL    RDW 12 1 11 0 - 15 0 %    Platelet Count 715 (L) 140 - 400 Thousand/uL    SL AMB MPV 12 6 (H) 7 5 - 12 5 fL    Neutrophils (Absolute) 3,335 1,500 - 7,800 cells/uL    Lymphocytes (Absolute) 1,235 850 - 3,900 cells/uL    Monocytes (Absolute) 1,102 (H) 200 - 950 cells/uL    Eosinophils (Absolute) 99 15 - 500 cells/uL    Basophils ABS 29 0 - 200 cells/uL    Neutrophils 57 5 %    Lymphocytes 21 3 %    Monocytes 19 0 %    Eosinophils 1 7 %    Basophils PCT 0 5 %   TSH, 3rd generation   Result Value Ref Range    TSH 2 52 0 40 - 4 50 mIU/L       Orders Placed This Encounter   Procedures    CBC and differential    Basic metabolic panel    Specimen draw for platelet clumping    Ambulatory referral to Physical Medicine Rehab       ALLERGIES:  Allergies   Allergen Reactions    Moxifloxacin Itching and Edema     Category: Allergy;     Lisinopril Rash    Valsartan Rash       Current Medications     Current Outpatient Medications   Medication Sig Dispense Refill    Cholecalciferol (VITAMIN D3) 2000 units TABS Take 1 tablet by mouth daily        DIOVAN 160 MG tablet Take 1 tablet (160 mg total) by mouth daily 90 tablet 1    Misc Natural Products (TURMERIC CURCUMIN) CAPS Take 1 capsule by mouth 2 (two) times a day 2400mg twice daily       Multiple Vitamins-Minerals (CENTRUM SILVER 50+MEN PO) Take by mouth      amLODIPine (NORVASC) 10 mg tablet TAKE ONE TABLET BY MOUTH EVERY DAY 90 tablet 1    azithromycin (ZITHROMAX) 500 MG tablet Take 1 tablet (500 mg total) by mouth daily for 5 days 5 tablet 0    oseltamivir (TAMIFLU) 75 mg capsule Take 1 capsule (75 mg total) by mouth 2 (two) times a day for 5 days 10 capsule 0     No current facility-administered medications for this visit          Medications Discontinued During This Encounter   Medication Reason    famotidine (PEPCID) 20 mg tablet Therapy completed    guaifenesin-codeine (GUAIFENESIN AC) 100-10 MG/5ML liquid Therapy completed    sulfamethoxazole-trimethoprim (BACTRIM DS) 800-160 mg per tablet Therapy completed    DIOVAN 160 MG tablet Reorder    valsartan (DIOVAN) 160 mg tablet     DIOVAN 160 MG tablet Reorder       Health Maintenance     Health Maintenance   Topic Date Due    HIV Screening  12/07/1980    Influenza Vaccine  03/28/2020 (Originally 7/1/2019)    DTaP,Tdap,and Td Vaccines (1 - Tdap) 03/28/2020 (Originally 12/7/1976)    Depression Screening PHQ  07/02/2020    CRC Screening: Colonoscopy  12/23/2020    Annual Physical  02/13/2021    BMI: Followup Plan  02/18/2021    BMI: Adult  03/02/2021    Pneumococcal Vaccine: 65+ Years (1 of 2 - PCV13) 12/07/2030    Hepatitis C Screening  Completed    Pneumococcal Vaccine: Pediatrics (0 to 5 Years) and At-Risk Patients (6 to 59 Years)  Aged Out    HIB Vaccine  Aged Out    Hepatitis B Vaccine  Aged Out    IPV Vaccine  Aged Out    Hepatitis A Vaccine  Aged Out    Meningococcal ACWY Vaccine  Aged Out    HPV Vaccine  Aged [employer] History   Administered Date(s) Administered    Influenza Quadrivalent Preservative Free 3 years and older IM 10/20/2017    Influenza TIV (IM) 08/20/2016       Keke Westfall MD

## 2020-03-03 RX ORDER — AMLODIPINE BESYLATE 10 MG/1
TABLET ORAL
Qty: 90 TABLET | Refills: 1 | Status: SHIPPED | OUTPATIENT
Start: 2020-03-03 | End: 2020-10-06

## 2020-03-05 NOTE — ASSESSMENT & PLAN NOTE
· Blood pressure is well controlled on regimen of Diovan 160 mg daily and amlodipine 10 mg daily  · Patient was evaluated by Cardiology and Nephrology in the past and is currently under care of Nephrology at [address] [place] [address]  · He presented with intermittent elevation of creatinine and normal Cystatin C   · Was felt that patient's creatinine is likely elevated due to muscle mass and routine intense workouts  · Recent blood work reveals creatinine 1 53   · Patient will repeat labs in 2-3 weeks with no workouts 48 hours prior to blood work

## 2020-03-05 NOTE — ASSESSMENT & PLAN NOTE
· Renal ultrasound November 2015:  No medical renal disease    · Renal artery scan :  No evidence of JOSE  · Nephrology believes that mild intermittent elevation of creatinine is likely related to muscle mass, will continue GFR monitoring

## 2020-03-05 NOTE — ASSESSMENT & PLAN NOTE
· Chronic mild thrombocytopenia    · Recent blood work reveals slightly decreased platelet count 715,067, lower than patient's previous value year ago 132,000, fluctuation could be related to recent illness, we will follow and reassess

## 2020-03-11 ENCOUNTER — HOSPITAL ENCOUNTER (OUTPATIENT)
Dept: RADIOLOGY | Facility: HOSPITAL | Age: 55
Discharge: HOME | End: 2020-03-11
Payer: COMMERCIAL

## 2020-03-11 DIAGNOSIS — T15.90XD FOREIGN BODY IN EYE, UNSPECIFIED LATERALITY, SUBSEQUENT ENCOUNTER: ICD-10-CM

## 2020-03-11 PROCEDURE — 70030 X-RAY EYE FOR FOREIGN BODY: CPT

## 2020-08-12 RX ORDER — SODIUM, POTASSIUM,MAG SULFATES 17.5-3.13G
SOLUTION, RECONSTITUTED, ORAL ORAL
COMMUNITY
Start: 2020-06-23 | End: 2020-08-20 | Stop reason: ALTCHOICE

## 2020-08-13 ENCOUNTER — TELEMEDICINE (OUTPATIENT)
Dept: FAMILY MEDICINE CLINIC | Facility: CLINIC | Age: 55
End: 2020-08-13
Payer: COMMERCIAL

## 2020-08-13 VITALS
HEART RATE: 71 BPM | DIASTOLIC BLOOD PRESSURE: 85 MMHG | OXYGEN SATURATION: 98 % | SYSTOLIC BLOOD PRESSURE: 125 MMHG | HEIGHT: 68 IN | BODY MASS INDEX: 30.01 KG/M2 | WEIGHT: 198 LBS

## 2020-08-13 DIAGNOSIS — M54.6 ACUTE RIGHT-SIDED THORACIC BACK PAIN: Primary | ICD-10-CM

## 2020-08-13 PROCEDURE — 3725F SCREEN DEPRESSION PERFORMED: CPT | Performed by: FAMILY MEDICINE

## 2020-08-13 PROCEDURE — 1036F TOBACCO NON-USER: CPT | Performed by: FAMILY MEDICINE

## 2020-08-13 PROCEDURE — 3074F SYST BP LT 130 MM HG: CPT | Performed by: FAMILY MEDICINE

## 2020-08-13 PROCEDURE — 3008F BODY MASS INDEX DOCD: CPT | Performed by: FAMILY MEDICINE

## 2020-08-13 PROCEDURE — 3079F DIAST BP 80-89 MM HG: CPT | Performed by: FAMILY MEDICINE

## 2020-08-13 PROCEDURE — 99213 OFFICE O/P EST LOW 20 MIN: CPT | Performed by: FAMILY MEDICINE

## 2020-08-13 RX ORDER — METHYLPREDNISOLONE 4 MG/1
TABLET ORAL
Qty: 21 EACH | Refills: 0 | Status: SHIPPED | OUTPATIENT
Start: 2020-08-13 | End: 2020-08-20 | Stop reason: ALTCHOICE

## 2020-08-13 NOTE — PROGRESS NOTES
Virtual Regular Visit      Assessment/Plan:    Problem List Items Addressed This Visit        Other    Acute right-sided thoracic back pain - Primary    Relevant Medications    methylPREDNISolone 4 MG tablet therapy pack        27-year-old male presents via virtual video telemedicine visit for evaluation of right-sided back pain that started 3-4 days ago  Patient states he was lifting bags around the yd  Pain is described as an ache aggravated with movement such as sitting or standing up or twisting  Patient has been taking Tylenol which helps take the edge off  I feel symptoms are likely musculoskeletal strain, spasm  Patient does not have any urinary symptoms, blood in the urine, shortness of breath  I will go ahead and start patient on Medrol Dosepak  Patient does have history of CKD, under the care of Nephrology at [facility] who has told patient that it is okay to take Medrol Dosepak if needed  He may apply heat to the area of discomfort as well  He may take Tylenol as needed as well  If symptoms should persist or worsen, I would like him to let me know  If he should develop any urinary symptoms, then we will have patient drop-off a urine sample  If he should have worsening pain, we will obtain x-rays and possible ultrasound of kidney? He will be in touch with me if he is not better  He will also keep follow-up appointment with Dr Janki Landry on 08/20  Reason for visit is right-sided back pain times 3-4 days  Chief Complaint   Patient presents with    Back Pain     x 3-4 days otc tylenol     Virtual Regular Visit        Encounter provider Alveta Hammans, MD    Provider located at [address] [place] [id number]      Recent Visits  No visits were found meeting these conditions     Showing recent visits within past 7 days and meeting all other requirements     Today's Visits  Date Type Provider Dept   08/13/20 Park Dakin, MD [address] [facility] today's visits and meeting all other requirements     Future Appointments  No visits were found meeting these conditions  Showing future appointments within next 150 days and meeting all other requirements        The patient was identified by name and date of birth  Patrick Cottrell was informed that this is a telemedicine visit and that the visit is being conducted through [facility] and patient was informed that this is not a secure, HIPAA-complaint platform  He agrees to proceed     My office door was closed  No one else was in the room  He acknowledged consent and understanding of privacy and security of the video platform  The patient has agreed to participate and understands they can discontinue the visit at any time  Patient is aware this is a billable service  Subjective  Patrick Cottrell is a 47 y o  male presents today via personal video telemedicine visit for evaluation of right sided back pain near kidney region/ribcage as per patient since Sunday  Patient states pain started after he lifted bags of turgor to around his yd  Patient describes the pain as an ache aggravated with when he sits up or stands up, with any type of movement such as twisting in bed  Denies fevers, nausea, urinary symptoms such as frequency, blood in the urine  Patient states taking Tylenol takes the edge off  Patient states when he is resting in 1 position, he does not experience pain  He does take curcumin regularly         HPI     Past Medical History:   Diagnosis Date    Benign prostatic hyperplasia     Herpes simplex     RESOLVED [id number]    Hypertension     Renal disorder        Past Surgical History:   Procedure Laterality Date    COLONOSCOPY  12/23/2015    DR [facility]    PROSTATE BIOPSY         Current Outpatient Medications   Medication Sig Dispense Refill    amLODIPine (NORVASC) 10 mg tablet TAKE ONE TABLET BY MOUTH EVERY DAY 90 tablet 1    Cholecalciferol (VITAMIN D3) 2000 units TABS Take 1 tablet by mouth daily        DIOVAN 160 MG tablet Take 1 tablet (160 mg total) by mouth daily 90 tablet 1    Multiple Vitamins-Minerals (CENTRUM SILVER 50+MEN PO) Take by mouth      Suprep Bowel Prep Kit 17 5-3 13-1 6 GM/177ML SOLN TAKE ACCORDING TO PHYSICIAN INSTRUCTIONS      methylPREDNISolone 4 MG tablet therapy pack Use as directed on package 21 each 0    Misc Natural Products (TURMERIC CURCUMIN) CAPS Take 1 capsule by mouth 2 (two) times a day 2400mg twice daily        No current facility-administered medications for this visit  Allergies   Allergen Reactions    Moxifloxacin Itching and Edema     Category: Allergy;     Lisinopril Rash    Valsartan Rash       Review of Systems   Constitutional: Negative for fever  Respiratory: Negative for shortness of breath  Gastrointestinal: Negative for nausea  Genitourinary: Negative for frequency and hematuria  Musculoskeletal: Positive for back pain  Skin: Negative for rash  Video Exam    Vitals:    08/13/20 0804   BP: 125/85   Pulse: 71   SpO2: 98%   Weight: 89 8 kg (198 lb)   Height: 5' 7 5" (1 715 m)       Physical Exam  Constitutional:       General: He is not in acute distress  Appearance: Normal appearance  Musculoskeletal: Normal range of motion  Skin:     Findings: No rash  Neurological:      Mental Status: He is alert and oriented to person, place, and time  I have spent 15 minutes with Patient  today in which greater than 50% of this time was spent in counseling/coordination of care regarding Prognosis, Risks and benefits of tx options, Intructions for management, Patient and family education, Importance of tx compliance, Risk factor reductions and Impressions  VIRTUAL VISIT DISCLAIMER    Bob Mckee acknowledges that he has consented to an online visit or consultation   He understands that the online visit is based solely on information provided by him, and that, in the absence of a face-to-face physical evaluation by the physician, the diagnosis he receives is both limited and provisional in terms of accuracy and completeness  This is not intended to replace a full medical face-to-face evaluation by the physician  Varsha Manning understands and accepts these terms

## 2020-08-20 ENCOUNTER — OFFICE VISIT (OUTPATIENT)
Dept: FAMILY MEDICINE CLINIC | Facility: CLINIC | Age: 55
End: 2020-08-20
Payer: COMMERCIAL

## 2020-08-20 VITALS
DIASTOLIC BLOOD PRESSURE: 70 MMHG | RESPIRATION RATE: 16 BRPM | TEMPERATURE: 98.9 F | SYSTOLIC BLOOD PRESSURE: 120 MMHG | BODY MASS INDEX: 31.22 KG/M2 | HEART RATE: 64 BPM | HEIGHT: 68 IN | WEIGHT: 206 LBS | OXYGEN SATURATION: 98 %

## 2020-08-20 DIAGNOSIS — D69.6 THROMBOCYTOPENIA (HCC): ICD-10-CM

## 2020-08-20 DIAGNOSIS — L50.9 URTICARIA: ICD-10-CM

## 2020-08-20 DIAGNOSIS — R10.9 FLANK PAIN: Primary | ICD-10-CM

## 2020-08-20 DIAGNOSIS — R79.89 ELEVATED SERUM CREATININE: ICD-10-CM

## 2020-08-20 DIAGNOSIS — N18.30 CKD (CHRONIC KIDNEY DISEASE) STAGE 3, GFR 30-59 ML/MIN (HCC): ICD-10-CM

## 2020-08-20 DIAGNOSIS — M79.18 MYOFASCIAL PAIN SYNDROME: ICD-10-CM

## 2020-08-20 DIAGNOSIS — I10 ESSENTIAL HYPERTENSION: ICD-10-CM

## 2020-08-20 PROCEDURE — 99214 OFFICE O/P EST MOD 30 MIN: CPT | Performed by: FAMILY MEDICINE

## 2020-08-20 PROCEDURE — 1036F TOBACCO NON-USER: CPT | Performed by: FAMILY MEDICINE

## 2020-08-20 PROCEDURE — 3008F BODY MASS INDEX DOCD: CPT | Performed by: FAMILY MEDICINE

## 2020-08-20 PROCEDURE — 3078F DIAST BP <80 MM HG: CPT | Performed by: FAMILY MEDICINE

## 2020-08-20 PROCEDURE — 3074F SYST BP LT 130 MM HG: CPT | Performed by: FAMILY MEDICINE

## 2020-08-20 RX ORDER — METHOCARBAMOL 750 MG/1
750 TABLET, FILM COATED ORAL 3 TIMES DAILY PRN
Qty: 30 TABLET | Refills: 0 | Status: SHIPPED | OUTPATIENT
Start: 2020-08-20

## 2020-08-20 RX ORDER — METHYLPREDNISOLONE 4 MG/1
TABLET ORAL
Qty: 21 EACH | Refills: 0 | Status: SHIPPED | OUTPATIENT
Start: 2020-08-20 | End: 2021-05-06

## 2020-08-20 NOTE — PROGRESS NOTES
FAMILY PRACTICE OFFICE VISIT       NAME: Rocco Roberts  AGE: 47 y o  SEX: male       : 1965        MRN: [id number]        Assessment and Plan     Problem List Items Addressed This Visit        Cardiovascular and Mediastinum    Essential hypertension     · Blood pressure is well controlled  · Continue regimen of Diovan 160 mg daily and Norvasc 10 mg daily  · Patient reports intermittent symptoms of exercise-induced your tick area, no clear evidence that it is ARB induced  · I advised patient to start Claritin 10 mg daily and proceed with re-evaluation by Dermatology  · If hives elements are recurring-can biopsy should be considered  · Patient developed significant side effects with trial of beta-blockers and alpha blockers         Relevant Orders    CBC and differential    Comprehensive metabolic panel    Lipid Panel with Direct LDL reflex    TSH, 3rd generation    Urinalysis with microscopic       Musculoskeletal and Integument    Myofascial pain syndrome    Relevant Medications    methocarbamol (ROBAXIN) 750 mg tablet    Urticaria     · I advised patient to schedule follow-up with Allergy immunology and Dermatology, skin biopsy should be considered  · Start Claritin 10 mg daily along with Pepcid 20 mg daily            Other    Elevated serum creatinine (Chronic)     · Renal ultrasound 2015 and 2019:  No medical renal disease    · Renal artery scan :  No evidence of JOSE  · Nephrology believes that mild intermittent elevation of creatinine is likely related to muscle mass  · Patient remains under care of Nephrology at [address] [place] [address], [address] [place] [address], he is monitored with series of BMP and Cystatin C with reflex GFR         Relevant Orders    Urinalysis with microscopic    Thrombocytopenia ([address] )    Relevant Orders    Specimen draw for platelet clumping      Other Visit Diagnoses     Flank pain    -  Primary    Relevant Medications    methylPREDNISolone 4 MG tablet therapy pack    Other Relevant Orders    US retroperitoneal complete      Patient presents for evaluation of right low back pain/flank pain  His symptoms occurred after lifting heavy bags while working outside and have improved significantly after completing Medrol Dosepak  Patient denies symptoms of dysuria, hematuria, fever, nausea, vomiting or lower extremity numbness, tingling, paresthesias or weakness  I suspect that his pain is likely musculoskeletal in nature  I advised patient to complete another round of Medrol Dosepak and add Robaxin 750 mg q h s  p r n  Artur Salvador Patient will proceed with kidney ultrasound for reassurance  Hypertension:  Blood pressure is well controlled  No evidence that patient's intermittent hives are related to Diovan use  He was intolerant to alpha and beta blockers in the past and has developed rash with lisinopril  Will continue same med regimen for hypertension right now  Patient will start combination of Claritin and Pepcid and will follow-up with Allergy/immunology and Dermatology  Routine blood work orders as outlined above  Patient remains under care of Nephrology at [facility]  and Urology at [facility]  Longstanding history of elevated creatinine with normal Cystatin C and normal GFR  Urology follows BPH and elevated PSA  There are no Patient Instructions on file for this visit  Discussed with the patient and all questioned fully answered  He will call me if any problems arise  M*Modal software was used to dictate this note  It may contain errors with dictating incorrect words/spelling  Please contact provider directly with any questions  Chief Complaint     Chief Complaint   Patient presents with    Follow-up     back pain       History of Present Illness     Patient presents for follow-up  He was evaluated by my partner on August 13th via telemedicine for symptoms of flank/back pain    Reportedly, patient has been lifting heavy bags of dirt on 8/10 and has developed lower back pain later that day  Patient states that his pain has resembled symptoms of renal colic that he has experienced in the past   Patient was treated with Medrol Dosepak and reports overall significant improvement of his symptoms after completing medication  Patient denies symptoms of dysuria, frequency or hematuria  No fever  No abdominal pain, nausea, vomiting, diarrhea or constipation  No sciatica, no LE numbness, tingling or paresthesia  Hypertension  Mild elevation of creatinine  Patient remains under care of Nephrology at [address], Dr Dougherty Presumruperto  Patient remains on regimen of Diovan and amlodipine  Nephrology follows [facility]  Creatinine range 1 44-1 5  CYSTATIN C  0 52 - 1 23 mg/L        0 97   EGFR Result:  >=60 mL/min/1 73m2       83        Patient reports intermittent symptoms of hives and itching since June  He is concerned about possible allergic reaction to Diovan  Patient denies symptoms of angioedema  No difficulty breathing or swallowing  Patient had extensive evaluation with Allergy and immunology, Dr Anastasiia Carroll  He was seen by dermatologist in the past, no skin biopsy  Hives usually appear after physical exertion  Patient report having similar symptoms in the past while using generic valsartan, he has been on regimen of brand-name Diovan for quite a while  No other new medications or supplements  Patient remains under care of [facility] for surveillance of elevated PSA/BPH      Review of Systems   Review of Systems   Constitutional: Negative  HENT: Negative  Eyes: Negative  Respiratory: Negative  Cardiovascular: Negative  Gastrointestinal: Negative  Endocrine: Negative  Genitourinary: Positive for flank pain  Musculoskeletal: Positive for back pain  Skin: Positive for rash  Allergic/Immunologic: Negative  Neurological: Negative  Hematological: Negative  Psychiatric/Behavioral: Negative          Active Problem List     Patient Active Problem List   Diagnosis    Essential hypertension    Myofascial pain syndrome    Elevated serum creatinine    BPH with obstruction/lower urinary tract symptoms    Elevated prostate specific antigen (PSA)    Angiomyolipoma of kidney    Mixed erectile dysfunction    Impingement syndrome of right shoulder    Thrombocytopenia (HCC)    Urticaria    Hemorrhoids, external, thrombosed    Impingement syndrome of left shoulder    Subacromial bursitis of right shoulder joint    Acute pain of right shoulder    Papilloma of palate    Acute right-sided thoracic back pain       Past Medical History:  Past Medical History:   Diagnosis Date    Benign prostatic hyperplasia     Herpes simplex     RESOLVED [id number]    Hypertension     Renal disorder        Past Surgical History:  Past Surgical History:   Procedure Laterality Date    [facility]    PROSTATE BIOPSY         Family History:  Family History   Problem Relation Age of Onset    Other Mother         CABG    Diabetes Mother     Hypertension Mother     Hypertension Father     Kidney cancer Family     Prostate cancer Family     Hypertension Brother     Hypertension Sister     Celiac disease Brother     No Known Problems Son     Eczema Daughter        Social History:  Social History     Socioeconomic History    Marital status: /Civil Union     Spouse name: Iavna     Number of children: 2    Years of education: Not on file    Highest education level: Not on file   Occupational History    Occupation: /[employer]   Social Needs    Financial resource strain: Not on file    Food insecurity     Worry: Not on file     Inability: Not on file   [employer] needs     Medical: Not on file     Non-medical: Not on file   Tobacco Use    Smoking status: Never Smoker    Smokeless tobacco: Never Used   Substance and Sexual Activity    Alcohol use: No    Drug use: No    Sexual activity: Not on file   Lifestyle    Physical activity     Days per week: 6 days     Minutes per session: 80 min    Stress: Not on file   Relationships    Social connections     Talks on phone: Not on file     Gets together: Not on file     Attends Zoroastrianism service: Not on file     Active member of club or organization: Not on file     Attends meetings of clubs or organizations: Not on file     Relationship status: Not on file    Intimate partner violence     Fear of current or ex partner: Not on file     Emotionally abused: Not on file     Physically abused: Not on file     Forced sexual activity: Not on file   Other Topics Concern    Not on file   Social History Narrative    Not on file           Objective     Vitals:    08/20/20 1626 08/20/20 1727   BP: 130/88 120/70   BP Location: Left arm    Patient Position: Sitting    Cuff Size: Adult    Pulse: 64    Resp: 16    Temp: 98 9 °F (37 2 °C)    TempSrc: Temporal    SpO2: 98%    Weight: 93 4 kg (206 lb)    Height: 5' 7 5" (1 715 m)      Wt Readings from Last 3 Encounters:   08/20/20 93 4 kg (206 lb)   08/13/20 89 8 kg (198 lb)   07/28/20 91 2 kg (201 lb)       Physical Exam  Vitals signs and nursing note reviewed  Constitutional:       Appearance: He is well-developed  HENT:      Head: Normocephalic and atraumatic  Eyes:      Conjunctiva/sclera: Conjunctivae normal    Neck:      Musculoskeletal: Neck supple  Vascular: No carotid bruit  Cardiovascular:      Rate and Rhythm: Normal rate and regular rhythm  Heart sounds: Normal heart sounds  No murmur  Pulmonary:      Effort: Pulmonary effort is normal  No respiratory distress  Breath sounds: Normal breath sounds  No wheezing or rales  Abdominal:      General: Bowel sounds are normal  There is no distension or abdominal bruit  Palpations: Abdomen is soft  Tenderness:  There is no right CVA tenderness or left CVA tenderness  Musculoskeletal: Normal range of motion  Right lower leg: No edema  Left lower leg: No edema  Comments: Negative straight leg rising bilaterally  Paraspinal spasm L3-L4-L4-L5 right more than left  No reproducible CVA tenderness   Skin:     General: Skin is warm  Neurological:      General: No focal deficit present  Mental Status: He is alert and oriented to person, place, and time  Cranial Nerves: No cranial nerve deficit  Psychiatric:         Mood and Affect: Mood normal          Behavior: Behavior normal          Thought Content:  Thought content normal          Pertinent Laboratory/Diagnostic Studies:  Lab Results   Component Value Date    GLUCOSE 97 01/20/2014    BUN 22 02/24/2020    CREATININE 1 52 (H) 02/24/2020    CALCIUM 9 6 02/24/2020     12/21/2017    K 4 3 02/24/2020    CO2 28 02/24/2020     02/24/2020     Lab Results   Component Value Date    ALT 37 02/24/2020    AST 26 02/24/2020    ALKPHOS 52 02/24/2020    BILITOT 0 8 12/21/2017       Lab Results   Component Value Date    WBC 5 8 02/24/2020    HGB 14 7 02/24/2020    HCT 45 1 02/24/2020    MCV 88 8 02/24/2020     (L) 02/24/2020       Lab Results   Component Value Date    TSH 2 52 02/24/2020       Lab Results   Component Value Date    CHOL 158 07/15/2017     Lab Results   Component Value Date    TRIG 43 02/24/2020     Lab Results   Component Value Date    HDL 52 02/24/2020     Lab Results   Component Value Date    LDLCALC 83 02/24/2020     Lab Results   Component Value Date    HGBA1C 5 0 04/28/2018       Results for orders placed or performed in visit on 02/24/20   Lipid panel   Result Value Ref Range    Total Cholesterol 147 <200 mg/dL    HDL 52 > OR = 40 mg/dL    Triglycerides 43 <150 mg/dL    LDL Calculated 83 mg/dL (calc)    Chol HDLC Ratio 2 8 <5 0 (calc)    Non-HDL Cholesterol 95 <130 mg/dL (calc)   Comprehensive metabolic panel   Result Value Ref Range    Glucose, Random 100 (H) 65 - 99 mg/dL    BUN 22 7 - 25 mg/dL    Creatinine 1 52 (H) 0 70 - 1 33 mg/dL    eGFR Non  51 (L) > OR = 60 mL/min/1 73m2    eGFR  59 (L) > OR = 60 mL/min/1 73m2    SL AMB BUN/CREATININE RATIO 14 6 - 22 (calc)    Sodium 137 135 - 146 mmol/L    Potassium 4 3 3 5 - 5 3 mmol/L    Chloride 102 98 - 110 mmol/L    CO2 28 20 - 32 mmol/L    Calcium 9 6 8 6 - 10 3 mg/dL    Protein, Total 7 1 6 1 - 8 1 g/dL    Albumin 4 0 3 6 - 5 1 g/dL    Globulin 3 1 1 9 - 3 7 g/dL (calc)    Albumin/Globulin Ratio 1 3 1 0 - 2 5 (calc)    TOTAL BILIRUBIN 0 7 0 2 - 1 2 mg/dL    Alkaline Phosphatase 52 35 - 144 U/L    AST 26 10 - 35 U/L    ALT 37 9 - 46 U/L   CBC and differential   Result Value Ref Range    White Blood Cell Count 5 8 3 8 - 10 8 Thousand/uL    Red Blood Cell Count 5 08 4 20 - 5 80 Million/uL    Hemoglobin 14 7 13 2 - 17 1 g/dL    HCT 45 1 38 5 - 50 0 %    MCV 88 8 80 0 - 100 0 fL    MCH 28 9 27 0 - 33 0 pg    MCHC 32 6 32 0 - 36 0 g/dL    RDW 12 1 11 0 - 15 0 %    Platelet Count 890 (L) 140 - 400 Thousand/uL    SL AMB MPV 12 6 (H) 7 5 - 12 5 fL    Neutrophils (Absolute) 3,335 1,500 - 7,800 cells/uL    Lymphocytes (Absolute) 1,235 850 - 3,900 cells/uL    Monocytes (Absolute) 1,102 (H) 200 - 950 cells/uL    Eosinophils (Absolute) 99 15 - 500 cells/uL    Basophils ABS 29 0 - 200 cells/uL    Neutrophils 57 5 %    Lymphocytes 21 3 %    Monocytes 19 0 %    Eosinophils 1 7 %    Basophils PCT 0 5 %   TSH, 3rd generation   Result Value Ref Range    TSH 2 52 0 40 - 4 50 mIU/L       Orders Placed This Encounter   Procedures    US retroperitoneal complete    CBC and differential    Comprehensive metabolic panel    Lipid Panel with Direct LDL reflex    TSH, 3rd generation    Specimen draw for platelet clumping    Urinalysis with microscopic       ALLERGIES:  Allergies   Allergen Reactions    Moxifloxacin Itching and Edema     Category:  Allergy;     Lisinopril Rash    Valsartan Rash       Current Medications Current Outpatient Medications   Medication Sig Dispense Refill    amLODIPine (NORVASC) 10 mg tablet TAKE ONE TABLET BY MOUTH EVERY DAY 90 tablet 1    Cholecalciferol (VITAMIN D3) 2000 units TABS Take 1 tablet by mouth daily        DIOVAN 160 MG tablet Take 1 tablet (160 mg total) by mouth daily 90 tablet 1    Misc Natural Products (TURMERIC CURCUMIN) CAPS Take 1 capsule by mouth 2 (two) times a day 2400mg twice daily       Multiple Vitamins-Minerals (CENTRUM SILVER 50+MEN PO) Take by mouth      methocarbamol (ROBAXIN) 750 mg tablet Take 1 tablet (750 mg total) by mouth 3 (three) times a day as needed for muscle spasms 30 tablet 0    methylPREDNISolone 4 MG tablet therapy pack Use as directed on package 21 each 0     No current facility-administered medications for this visit          Medications Discontinued During This Encounter   Medication Reason    Suprep Bowel Prep Kit 17 5-3 13-1 6 GM/177ML SOLN Therapy completed    methylPREDNISolone 4 MG tablet therapy pack Therapy completed       Health Maintenance     Health Maintenance   Topic Date Due    HIV Screening  12/07/1980    DTaP,Tdap,and Td Vaccines (1 - Tdap) 12/07/1986    Influenza Vaccine  07/01/2020    BMI: Followup Plan  02/18/2021    Annual Physical  02/13/2021    Depression Screening PHQ  08/13/2021    BMI: Adult  08/20/2021    Colonoscopy Surveillance  06/29/2025    Colorectal Cancer Screening  06/29/2030    Hepatitis C Screening  Completed    Pneumococcal Vaccine: Pediatrics (0 to 5 Years) and At-Risk Patients (6 to 59 Years)  Aged Out    HIB Vaccine  Aged Out    Hepatitis B Vaccine  Aged Out    IPV Vaccine  Aged Out    Hepatitis A Vaccine  Aged Out    Meningococcal ACWY Vaccine  Aged Out    HPV Vaccine  Aged [employer] History   Administered Date(s) Administered    Influenza Quadrivalent Preservative Free 3 years and older IM 10/20/2017    Influenza TIV (IM) 08/20/2016       Marcelina Vargas MD

## 2020-08-25 ENCOUNTER — TELEPHONE (OUTPATIENT)
Dept: FAMILY MEDICINE CLINIC | Facility: CLINIC | Age: 55
End: 2020-08-25

## 2020-08-25 PROBLEM — R79.89 ELEVATED SERUM CREATININE: Status: ACTIVE | Noted: 2018-04-04

## 2020-08-25 PROBLEM — R79.89 ELEVATED SERUM CREATININE: Chronic | Status: ACTIVE | Noted: 2018-04-04

## 2020-08-26 NOTE — ASSESSMENT & PLAN NOTE
· I advised patient to schedule follow-up with Allergy immunology and Dermatology, skin biopsy should be considered  · Start Claritin 10 mg daily along with Pepcid 20 mg daily

## 2020-08-26 NOTE — ASSESSMENT & PLAN NOTE
· Renal ultrasound November 2015 and March 2019:  No medical renal disease    · Renal artery scan :  No evidence of JOSE  · Nephrology believes that mild intermittent elevation of creatinine is likely related to muscle mass  · Patient remains under care of Nephrology at [address] [place], [place] [id number] [facility], he is monitored with series of BMP and Cystatin C with reflex GFR

## 2020-08-26 NOTE — ASSESSMENT & PLAN NOTE
· Blood pressure is well controlled  · Continue regimen of Diovan 160 mg daily and Norvasc 10 mg daily  · Patient reports intermittent symptoms of exercise-induced your tick area, no clear evidence that it is ARB induced  · I advised patient to start Claritin 10 mg daily and proceed with re-evaluation by Dermatology  · If hives elements are recurring-can biopsy should be considered    · Patient developed significant side effects with trial of beta-blockers and alpha blockers

## 2020-09-04 ENCOUNTER — HOSPITAL ENCOUNTER (OUTPATIENT)
Dept: RADIOLOGY | Facility: HOSPITAL | Age: 55
Discharge: HOME | End: 2020-09-04
Payer: COMMERCIAL

## 2020-09-04 DIAGNOSIS — N18.30 CKD (CHRONIC KIDNEY DISEASE) STAGE 3, GFR 30-59 ML/MIN (HCC): ICD-10-CM

## 2020-09-04 DIAGNOSIS — R10.9 FLANK PAIN: ICD-10-CM

## 2020-09-04 PROCEDURE — 76770 US EXAM ABDO BACK WALL COMP: CPT

## 2020-09-06 LAB
ALBUMIN SERPL-MCNC: 4.3 G/DL (ref 3.6–5.1)
ALBUMIN/GLOB SERPL: 1.7 (CALC) (ref 1–2.5)
ALP SERPL-CCNC: 43 U/L (ref 35–144)
ALT SERPL-CCNC: 36 U/L (ref 9–46)
APPEARANCE UR: ABNORMAL
AST SERPL-CCNC: 26 U/L (ref 10–35)
BACTERIA UR QL AUTO: ABNORMAL /HPF
BASOPHILS # BLD AUTO: 19 CELLS/UL (ref 0–200)
BASOPHILS NFR BLD AUTO: 0.4 %
BILIRUB SERPL-MCNC: 1 MG/DL (ref 0.2–1.2)
BILIRUB UR QL STRIP: NEGATIVE
BUN SERPL-MCNC: 24 MG/DL (ref 7–25)
BUN/CREAT SERPL: NORMAL (CALC) (ref 6–22)
CALCIUM SERPL-MCNC: 9.2 MG/DL (ref 8.6–10.3)
CHLORIDE SERPL-SCNC: 104 MMOL/L (ref 98–110)
CHOLEST SERPL-MCNC: 170 MG/DL
CHOLEST/HDLC SERPL: 2.7 (CALC)
CO2 SERPL-SCNC: 28 MMOL/L (ref 20–32)
COLOR UR: YELLOW
CREAT SERPL-MCNC: 1.32 MG/DL (ref 0.7–1.33)
EOSINOPHIL # BLD AUTO: 132 CELLS/UL (ref 15–500)
EOSINOPHIL NFR BLD AUTO: 2.8 %
ERYTHROCYTE [DISTWIDTH] IN BLOOD BY AUTOMATED COUNT: 12 % (ref 11–15)
GLOBULIN SER CALC-MCNC: 2.5 G/DL (CALC) (ref 1.9–3.7)
GLUCOSE SERPL-MCNC: 97 MG/DL (ref 65–99)
GLUCOSE UR QL STRIP: NEGATIVE
HCT VFR BLD AUTO: 44.8 % (ref 38.5–50)
HDLC SERPL-MCNC: 62 MG/DL
HGB BLD-MCNC: 15.2 G/DL (ref 13.2–17.1)
HGB UR QL STRIP: NEGATIVE
HYALINE CASTS #/AREA URNS LPF: ABNORMAL /LPF
KETONES UR QL STRIP: NEGATIVE
LDLC SERPL CALC-MCNC: 97 MG/DL (CALC)
LEUKOCYTE ESTERASE UR QL STRIP: NEGATIVE
LYMPHOCYTES # BLD AUTO: 1542 CELLS/UL (ref 850–3900)
LYMPHOCYTES NFR BLD AUTO: 32.8 %
MCH RBC QN AUTO: 30.3 PG (ref 27–33)
MCHC RBC AUTO-ENTMCNC: 33.9 G/DL (ref 32–36)
MCV RBC AUTO: 89.2 FL (ref 80–100)
MONOCYTES # BLD AUTO: 611 CELLS/UL (ref 200–950)
MONOCYTES NFR BLD AUTO: 13 %
NEUTROPHILS # BLD AUTO: 2397 CELLS/UL (ref 1500–7800)
NEUTROPHILS NFR BLD AUTO: 51 %
NITRITE UR QL STRIP: NEGATIVE
NONHDLC SERPL-MCNC: 108 MG/DL (CALC)
PH UR STRIP: 6.5 [PH] (ref 5–8)
PLATELET # BLD AUTO: NORMAL THOUSAND/UL
POTASSIUM SERPL-SCNC: 4.1 MMOL/L (ref 3.5–5.3)
PROT SERPL-MCNC: 6.8 G/DL (ref 6.1–8.1)
PROT UR QL STRIP: NEGATIVE
RBC # BLD AUTO: 5.02 MILLION/UL (ref 4.2–5.8)
RBC #/AREA URNS HPF: ABNORMAL /HPF
SERVICE CMNT-IMP: NORMAL
SL AMB EGFR AFRICAN AMERICAN: 70 ML/MIN/1.73M2
SL AMB EGFR NON AFRICAN AMERICAN: 61 ML/MIN/1.73M2
SODIUM SERPL-SCNC: 139 MMOL/L (ref 135–146)
SP GR UR STRIP: 1.02 (ref 1–1.03)
SQUAMOUS #/AREA URNS HPF: ABNORMAL /HPF
TRIGL SERPL-MCNC: 37 MG/DL
TSH SERPL-ACNC: 2.1 MIU/L (ref 0.4–4.5)
WBC # BLD AUTO: 4.7 THOUSAND/UL (ref 3.8–10.8)
WBC #/AREA URNS HPF: ABNORMAL /HPF

## 2020-09-08 DIAGNOSIS — D69.6 THROMBOCYTOPENIA (HCC): Primary | ICD-10-CM

## 2020-09-10 ENCOUNTER — TELEPHONE (OUTPATIENT)
Dept: FAMILY MEDICINE CLINIC | Facility: CLINIC | Age: 55
End: 2020-09-10

## 2020-09-10 NOTE — TELEPHONE ENCOUNTER
I spoke with patient tonight regarding results of renal ultrasound  Radiologist reports echogenic renal cortices consistent with medical renal disease  Most recent blood work reveals normal creatinine of 1 32 and GFR of 70  I will forward renal ultrasound to patient's nephrologist at [address], Dr Kendra Blizzard  I advised patient to contact him to review results  Patient understands instructions and agrees

## 2020-09-11 ENCOUNTER — OFFICE VISIT (OUTPATIENT)
Dept: UROLOGY | Facility: MEDICAL CENTER | Age: 55
End: 2020-09-11
Payer: COMMERCIAL

## 2020-09-11 ENCOUNTER — TELEPHONE (OUTPATIENT)
Dept: FAMILY MEDICINE CLINIC | Facility: CLINIC | Age: 55
End: 2020-09-11

## 2020-09-11 VITALS
HEIGHT: 68 IN | WEIGHT: 203 LBS | DIASTOLIC BLOOD PRESSURE: 82 MMHG | BODY MASS INDEX: 30.77 KG/M2 | TEMPERATURE: 98.2 F | SYSTOLIC BLOOD PRESSURE: 124 MMHG

## 2020-09-11 DIAGNOSIS — N13.8 BPH WITH OBSTRUCTION/LOWER URINARY TRACT SYMPTOMS: Primary | ICD-10-CM

## 2020-09-11 DIAGNOSIS — N40.1 BPH WITH OBSTRUCTION/LOWER URINARY TRACT SYMPTOMS: Primary | ICD-10-CM

## 2020-09-11 DIAGNOSIS — I10 ESSENTIAL HYPERTENSION: ICD-10-CM

## 2020-09-11 PROCEDURE — 81003 URINALYSIS AUTO W/O SCOPE: CPT | Performed by: UROLOGY

## 2020-09-11 PROCEDURE — 3079F DIAST BP 80-89 MM HG: CPT | Performed by: UROLOGY

## 2020-09-11 PROCEDURE — 1036F TOBACCO NON-USER: CPT | Performed by: UROLOGY

## 2020-09-11 PROCEDURE — 99214 OFFICE O/P EST MOD 30 MIN: CPT | Performed by: UROLOGY

## 2020-09-11 RX ORDER — SILODOSIN 8 MG/1
1 CAPSULE ORAL DAILY
Qty: 90 CAPSULE | Refills: 3 | Status: SHIPPED | OUTPATIENT
Start: 2020-09-11 | End: 2021-09-16

## 2020-09-11 RX ORDER — VALSARTAN 160 MG/1
TABLET ORAL
Qty: 90 TABLET | Refills: 1 | Status: SHIPPED | OUTPATIENT
Start: 2020-09-11 | End: 2021-03-03

## 2020-09-11 NOTE — PROGRESS NOTES
HISTORY:    Worsening BPH, he describes this as more retention, but he actually is emptying okay  Frequency, urgency, slower flow, nocturia times 1-3  Did not tolerate either Flomax or Uroxatral past   No help finasteride  ASSESSMENT / PLAN:    Rapaflo prescribed  cysto in six weeks    The following portions of the patient's history were reviewed and updated as appropriate: allergies, current medications, past family history, past medical history, past social history, past surgical history and problem list     Review of Systems      Objective:     Physical Exam  Constitutional:       General: He is not in acute distress  Appearance: He is well-developed  He is not diaphoretic  HENT:      Head: Normocephalic and atraumatic  Eyes:      General: No scleral icterus  Pulmonary:      Effort: Pulmonary effort is normal    Genitourinary:     Comments: Penis testes normal    Prostate moderately enlarged no nodules  Skin:     Coloration: Skin is not pale  Neurological:      Mental Status: He is alert and oriented to person, place, and time  Psychiatric:         Behavior: Behavior normal          Thought Content: Thought content normal          Judgment: Judgment normal            0   Lab Value Date/Time    PSA 4 4 (H) 03/29/2019 1220   ]  BUN   Date Value Ref Range Status   09/05/2020 24 7 - 25 mg/dL Final     Creatinine   Date Value Ref Range Status   09/05/2020 1 32 0 70 - 1 33 mg/dL Final     Comment:     For patients >52years of age, the reference limit  for Creatinine is approximately 13% higher for people  identified as -American         03/25/2019 1 41 (H) 0 60 - 1 30 mg/dL Final     Comment:     Standardized to IDMS reference method   12/21/2017 1 39 (H) 0 70 - 1 33 mg/dL Final     Comment:     Result Comment: For patients >52years of age, the reference limit  for Creatinine is approximately 13% higher for people  identified as -American         No components found for: CBC      Patient Active Problem List   Diagnosis    Essential hypertension    Myofascial pain syndrome    Elevated serum creatinine    BPH with obstruction/lower urinary tract symptoms    Elevated prostate specific antigen (PSA)    Angiomyolipoma of kidney    Mixed erectile dysfunction    Impingement syndrome of right shoulder    Thrombocytopenia (HCC)    Urticaria    Hemorrhoids, external, thrombosed    Impingement syndrome of left shoulder    Subacromial bursitis of right shoulder joint    Acute pain of right shoulder    Papilloma of palate    Acute right-sided thoracic back pain        Diagnoses and all orders for this visit:    BPH with obstruction/lower urinary tract symptoms  -     POCT urine dip auto non-scope  -     Silodosin 8 MG CAPS; Take 1 capsule by mouth daily  -     PSA Total, Diagnostic; Future           Patient ID: Chaya Crawford is a 47 y o  male        Current Outpatient Medications:     amLODIPine (NORVASC) 10 mg tablet, TAKE ONE TABLET BY MOUTH EVERY DAY, Disp: 90 tablet, Rfl: 1    Cholecalciferol (VITAMIN D3) 2000 units TABS, Take 1 tablet by mouth daily  , Disp: , Rfl:     Misc Natural Products (TURMERIC CURCUMIN) CAPS, Take 1 capsule by mouth 2 (two) times a day 2400mg twice daily , Disp: , Rfl:     Multiple Vitamins-Minerals (CENTRUM SILVER 50+MEN PO), Take by mouth, Disp: , Rfl:     Diovan 160 MG tablet, TAKE ONE TABLET BY MOUTH EVERY DAY, Disp: 90 tablet, Rfl: 1    methocarbamol (ROBAXIN) 750 mg tablet, Take 1 tablet (750 mg total) by mouth 3 (three) times a day as needed for muscle spasms (Patient not taking: Reported on 9/11/2020), Disp: 30 tablet, Rfl: 0    methylPREDNISolone 4 MG tablet therapy pack, Use as directed on package (Patient not taking: Reported on 9/11/2020), Disp: 21 each, Rfl: 0    Silodosin 8 MG CAPS, Take 1 capsule by mouth daily, Disp: 90 capsule, Rfl: 3    Past Medical History:   Diagnosis Date    Benign prostatic hyperplasia     Herpes simplex RESOLVED [id number]    Hypertension     Renal disorder        Past Surgical History:   Procedure Laterality Date    COLONOSCOPY  12/23/2015    DR Akhtar [facility] BIOPSY         Social History

## 2020-09-11 NOTE — TELEPHONE ENCOUNTER
----- Message from Anne Whittington sent at 9/11/2020 11:06 AM EDT -----  Regarding: FW:Blood work results  Contact: [phone]    ----- Message -----  From: Lito Mahoney  Sent: 9/11/2020   8:45 AM EDT  To: [facility]  Subject: RE:Blood work results                            Hi Dr Sobeida Cain     Here is the fax number [phone] for Dr Nancy Zapata and his associate Bharti Donahue      Thank you     Helen Israel

## 2020-10-06 DIAGNOSIS — I10 ESSENTIAL HYPERTENSION: ICD-10-CM

## 2020-10-06 RX ORDER — AMLODIPINE BESYLATE 10 MG/1
TABLET ORAL
Qty: 90 TABLET | Refills: 1 | Status: SHIPPED | OUTPATIENT
Start: 2020-10-06 | End: 2021-04-17

## 2020-10-07 ENCOUNTER — TELEPHONE (OUTPATIENT)
Dept: FAMILY MEDICINE CLINIC | Facility: CLINIC | Age: 55
End: 2020-10-07

## 2020-10-12 ENCOUNTER — TELEPHONE (OUTPATIENT)
Dept: UROLOGY | Facility: MEDICAL CENTER | Age: 55
End: 2020-10-12

## 2020-10-28 DIAGNOSIS — N40.1 BPH WITH OBSTRUCTION/LOWER URINARY TRACT SYMPTOMS: Primary | ICD-10-CM

## 2020-10-28 DIAGNOSIS — R97.20 ELEVATED PROSTATE SPECIFIC ANTIGEN (PSA): ICD-10-CM

## 2020-10-28 DIAGNOSIS — N13.8 BPH WITH OBSTRUCTION/LOWER URINARY TRACT SYMPTOMS: Primary | ICD-10-CM

## 2020-11-02 RX ORDER — TADALAFIL 5 MG/1
5 TABLET ORAL DAILY
Qty: 30 TABLET | Refills: 1 | Status: SHIPPED | OUTPATIENT
Start: 2020-11-02 | End: 2021-01-02

## 2021-01-02 DIAGNOSIS — R97.20 ELEVATED PROSTATE SPECIFIC ANTIGEN (PSA): ICD-10-CM

## 2021-01-02 DIAGNOSIS — N40.1 BPH WITH OBSTRUCTION/LOWER URINARY TRACT SYMPTOMS: ICD-10-CM

## 2021-01-02 DIAGNOSIS — N13.8 BPH WITH OBSTRUCTION/LOWER URINARY TRACT SYMPTOMS: ICD-10-CM

## 2021-01-02 RX ORDER — TADALAFIL 5 MG/1
TABLET ORAL
Qty: 30 TABLET | Refills: 1 | Status: SHIPPED | OUTPATIENT
Start: 2021-01-02 | End: 2021-03-03

## 2021-03-03 DIAGNOSIS — N40.1 BPH WITH OBSTRUCTION/LOWER URINARY TRACT SYMPTOMS: ICD-10-CM

## 2021-03-03 DIAGNOSIS — I10 ESSENTIAL HYPERTENSION: ICD-10-CM

## 2021-03-03 DIAGNOSIS — R97.20 ELEVATED PROSTATE SPECIFIC ANTIGEN (PSA): ICD-10-CM

## 2021-03-03 DIAGNOSIS — N13.8 BPH WITH OBSTRUCTION/LOWER URINARY TRACT SYMPTOMS: ICD-10-CM

## 2021-03-03 RX ORDER — TADALAFIL 5 MG/1
TABLET ORAL
Qty: 30 TABLET | Refills: 1 | Status: SHIPPED | OUTPATIENT
Start: 2021-03-03 | End: 2021-05-05

## 2021-03-03 RX ORDER — VALSARTAN 160 MG/1
TABLET ORAL
Qty: 90 TABLET | Refills: 1 | Status: SHIPPED | OUTPATIENT
Start: 2021-03-03 | End: 2021-09-01

## 2021-03-30 DIAGNOSIS — Z23 ENCOUNTER FOR IMMUNIZATION: ICD-10-CM

## 2021-04-16 DIAGNOSIS — I10 ESSENTIAL HYPERTENSION: ICD-10-CM

## 2021-04-17 RX ORDER — AMLODIPINE BESYLATE 10 MG/1
TABLET ORAL
Qty: 90 TABLET | Refills: 1 | Status: SHIPPED | OUTPATIENT
Start: 2021-04-17 | End: 2021-10-30

## 2021-04-20 ENCOUNTER — TELEPHONE (OUTPATIENT)
Dept: FAMILY MEDICINE CLINIC | Facility: CLINIC | Age: 56
End: 2021-04-20

## 2021-04-20 DIAGNOSIS — I10 ESSENTIAL HYPERTENSION: Primary | ICD-10-CM

## 2021-04-20 DIAGNOSIS — M79.18 MYOFASCIAL PAIN SYNDROME: ICD-10-CM

## 2021-04-20 DIAGNOSIS — R79.89 ELEVATED SERUM CREATININE: Chronic | ICD-10-CM

## 2021-04-20 DIAGNOSIS — D69.6 THROMBOCYTOPENIA (HCC): ICD-10-CM

## 2021-04-20 DIAGNOSIS — N52.8 MIXED ERECTILE DYSFUNCTION: ICD-10-CM

## 2021-04-20 NOTE — TELEPHONE ENCOUNTER
Patient scheduled for annual physical on 5/6/21 and is requesting BW orders to be placed so patient can get this done before his appointment  Patient specifically asked for cholesterol, testosterone, and any other routine BW  Patient will be going to [facility] to get this done, and asked for a call when placed so he can come  the orders

## 2021-04-20 NOTE — TELEPHONE ENCOUNTER
I will place blood work orders for patient  Testosterone is not a part of routine panel but if patient would like it  to be checked I will order it as well  I did at Cystatin C to his  panel as well    Thank you

## 2021-04-21 DIAGNOSIS — G47.00 INSOMNIA, UNSPECIFIED TYPE: ICD-10-CM

## 2021-04-22 ENCOUNTER — TELEPHONE (OUTPATIENT)
Dept: FAMILY MEDICINE CLINIC | Facility: CLINIC | Age: 56
End: 2021-04-22

## 2021-04-22 RX ORDER — ZOLPIDEM TARTRATE 10 MG/1
TABLET ORAL
Qty: 30 TABLET | Refills: 1 | OUTPATIENT
Start: 2021-04-22

## 2021-04-22 NOTE — TELEPHONE ENCOUNTER
Spoke with pt re: sons medical condition  Son is away at school and will be returning tomorrow for a scheduled appt with ROBINSON REYNOSO at 9am tomorrow  This issue will be addressed by that office  I have advised pt to have Dr Gardner copied on all testing for her review/eval      Pt agrees with plan for son as he is at college and studying for final exams

## 2021-04-22 NOTE — TELEPHONE ENCOUNTER
----- Message from Kimberlny Molina sent at 4/22/2021 10:44 AM EDT -----  Regarding: Non-Urgent Medical Question  Contact: [phone]  Hi Dr Mando Hensley     This is Sara Bustillo, Max is having testicular pain, both sides  Hes not able to see anyone in the office until Tuesday (appointment made with you NP/PA) and urologist wont see until you see him and diagnose  I called down to [facility] and they can conduct a televisit next Friday but need an ultrasound ordered for him by you, of that area  What are your thoughts he wants to come this weekend  I can take him to the [facility]  urgent care Friday  Is it possibly mumps? He doesnt complain and when he does I know something is wrong      Thank you     My phone is [phone]

## 2021-04-26 ENCOUNTER — TELEPHONE (OUTPATIENT)
Dept: FAMILY MEDICINE CLINIC | Facility: CLINIC | Age: 56
End: 2021-04-26

## 2021-04-26 DIAGNOSIS — G47.00 INSOMNIA, UNSPECIFIED TYPE: Primary | ICD-10-CM

## 2021-04-26 RX ORDER — ZOLPIDEM TARTRATE 10 MG/1
10 TABLET ORAL
Qty: 30 TABLET | Refills: 1 | Status: SHIPPED | OUTPATIENT
Start: 2021-04-26

## 2021-04-26 NOTE — TELEPHONE ENCOUNTER
Please contact patient  I reviewed his my chart message  Prescription for Ambien was denied since it is not on his current med list   If patient is still using medication intermittently and needs a refill - I will send Rx  to the pharmacy now    Thank you

## 2021-04-26 NOTE — TELEPHONE ENCOUNTER
Regarding: Non-Urgent Medical Question  Contact: [phone]  ----- Message from Rodriguez Rogers sent at 4/26/2021  8:12 AM EDT -----  Please review and advise      ----- Message from Yovany Braun to Laine Gates MD sent at 4/24/2021 10:27 AM -----   Yoni Henson     I am coming in for an appointment on the 6th  You called in a reorder for a prescription for Zolpidem for sleeping  I havent had it filled since 2019  I use only a half tab when I need them  I wasnt told it was refused for refill  Can you please explain why? I dont abuse them and dont believe there are any interactions with anything I am currently taking        Thank you     Zhen Strauss

## 2021-05-01 LAB
25(OH)D3 SERPL-MCNC: 54 NG/ML (ref 30–100)
ALBUMIN SERPL-MCNC: 4.4 G/DL (ref 3.6–5.1)
ALBUMIN/GLOB SERPL: 1.6 (CALC) (ref 1–2.5)
ALP SERPL-CCNC: 47 U/L (ref 35–144)
ALT SERPL-CCNC: 29 U/L (ref 9–46)
AST SERPL-CCNC: 25 U/L (ref 10–35)
BASOPHILS # BLD AUTO: 38 CELLS/UL (ref 0–200)
BASOPHILS NFR BLD AUTO: 0.8 %
BILIRUB SERPL-MCNC: 0.8 MG/DL (ref 0.2–1.2)
BUN SERPL-MCNC: 27 MG/DL (ref 7–25)
BUN/CREAT SERPL: 20 (CALC) (ref 6–22)
CALCIUM SERPL-MCNC: 9.2 MG/DL (ref 8.6–10.3)
CHLORIDE SERPL-SCNC: 102 MMOL/L (ref 98–110)
CHOLEST SERPL-MCNC: 155 MG/DL
CHOLEST/HDLC SERPL: 2.9 (CALC)
CO2 SERPL-SCNC: 29 MMOL/L (ref 20–32)
CREAT SERPL-MCNC: 1.38 MG/DL (ref 0.7–1.33)
CYSTATIN C SERPL-MCNC: 1.03 MG/L (ref 0.52–1.23)
EOSINOPHIL # BLD AUTO: 99 CELLS/UL (ref 15–500)
EOSINOPHIL NFR BLD AUTO: 2.1 %
ERYTHROCYTE [DISTWIDTH] IN BLOOD BY AUTOMATED COUNT: 12.1 % (ref 11–15)
GFR/BSA.PRED SERPLBLD CYS-BASED-ARV: 76 ML/MIN/1.73M2
GLOBULIN SER CALC-MCNC: 2.8 G/DL (CALC) (ref 1.9–3.7)
GLUCOSE SERPL-MCNC: 92 MG/DL (ref 65–99)
HCT VFR BLD AUTO: 46.1 % (ref 38.5–50)
HDLC SERPL-MCNC: 53 MG/DL
HGB BLD-MCNC: 15.4 G/DL (ref 13.2–17.1)
LDLC SERPL CALC-MCNC: 92 MG/DL (CALC)
LYMPHOCYTES # BLD AUTO: 1260 CELLS/UL (ref 850–3900)
LYMPHOCYTES NFR BLD AUTO: 26.8 %
MCH RBC QN AUTO: 29.8 PG (ref 27–33)
MCHC RBC AUTO-ENTMCNC: 33.4 G/DL (ref 32–36)
MCV RBC AUTO: 89.2 FL (ref 80–100)
MONOCYTES # BLD AUTO: 644 CELLS/UL (ref 200–950)
MONOCYTES NFR BLD AUTO: 13.7 %
NEUTROPHILS # BLD AUTO: 2660 CELLS/UL (ref 1500–7800)
NEUTROPHILS NFR BLD AUTO: 56.6 %
NONHDLC SERPL-MCNC: 102 MG/DL (CALC)
PLATELET # BLD AUTO: NORMAL THOUSAND/UL
POTASSIUM SERPL-SCNC: 4.1 MMOL/L (ref 3.5–5.3)
PROT SERPL-MCNC: 7.2 G/DL (ref 6.1–8.1)
RBC # BLD AUTO: 5.17 MILLION/UL (ref 4.2–5.8)
SERVICE CMNT-IMP: NORMAL
SL AMB EGFR AFRICAN AMERICAN: 66 ML/MIN/1.73M2
SL AMB EGFR NON AFRICAN AMERICAN: 57 ML/MIN/1.73M2
SODIUM SERPL-SCNC: 136 MMOL/L (ref 135–146)
TESTOST FREE SERPL-MCNC: 90.7 PG/ML (ref 35–155)
TESTOST SERPL-MCNC: 458 NG/DL (ref 250–1100)
TRIGL SERPL-MCNC: 35 MG/DL
TSH SERPL-ACNC: 2.28 MIU/L (ref 0.4–4.5)
WBC # BLD AUTO: 4.7 THOUSAND/UL (ref 3.8–10.8)

## 2021-05-05 DIAGNOSIS — N13.8 BPH WITH OBSTRUCTION/LOWER URINARY TRACT SYMPTOMS: ICD-10-CM

## 2021-05-05 DIAGNOSIS — N40.1 BPH WITH OBSTRUCTION/LOWER URINARY TRACT SYMPTOMS: ICD-10-CM

## 2021-05-05 DIAGNOSIS — R97.20 ELEVATED PROSTATE SPECIFIC ANTIGEN (PSA): ICD-10-CM

## 2021-05-05 RX ORDER — TADALAFIL 5 MG/1
TABLET ORAL
Qty: 30 TABLET | Refills: 1 | Status: SHIPPED | OUTPATIENT
Start: 2021-05-05 | End: 2021-07-06

## 2021-05-06 ENCOUNTER — OFFICE VISIT (OUTPATIENT)
Dept: FAMILY MEDICINE CLINIC | Facility: CLINIC | Age: 56
End: 2021-05-06
Payer: COMMERCIAL

## 2021-05-06 VITALS
DIASTOLIC BLOOD PRESSURE: 80 MMHG | OXYGEN SATURATION: 99 % | BODY MASS INDEX: 31.58 KG/M2 | SYSTOLIC BLOOD PRESSURE: 124 MMHG | HEIGHT: 68 IN | RESPIRATION RATE: 16 BRPM | HEART RATE: 69 BPM | TEMPERATURE: 97.6 F | WEIGHT: 208.4 LBS

## 2021-05-06 DIAGNOSIS — R79.89 ELEVATED SERUM CREATININE: Chronic | ICD-10-CM

## 2021-05-06 DIAGNOSIS — I10 ESSENTIAL HYPERTENSION: ICD-10-CM

## 2021-05-06 DIAGNOSIS — N40.1 BPH WITH OBSTRUCTION/LOWER URINARY TRACT SYMPTOMS: ICD-10-CM

## 2021-05-06 DIAGNOSIS — N13.8 BPH WITH OBSTRUCTION/LOWER URINARY TRACT SYMPTOMS: ICD-10-CM

## 2021-05-06 DIAGNOSIS — D69.6 THROMBOCYTOPENIA (HCC): ICD-10-CM

## 2021-05-06 DIAGNOSIS — R97.20 ELEVATED PROSTATE SPECIFIC ANTIGEN (PSA): ICD-10-CM

## 2021-05-06 DIAGNOSIS — G47.00 INSOMNIA, UNSPECIFIED TYPE: ICD-10-CM

## 2021-05-06 DIAGNOSIS — Z00.00 ENCOUNTER FOR HEALTH MAINTENANCE EXAMINATION IN ADULT: Primary | ICD-10-CM

## 2021-05-06 PROCEDURE — 3079F DIAST BP 80-89 MM HG: CPT | Performed by: FAMILY MEDICINE

## 2021-05-06 PROCEDURE — 1036F TOBACCO NON-USER: CPT | Performed by: FAMILY MEDICINE

## 2021-05-06 PROCEDURE — 3008F BODY MASS INDEX DOCD: CPT | Performed by: FAMILY MEDICINE

## 2021-05-06 PROCEDURE — 99396 PREV VISIT EST AGE 40-64: CPT | Performed by: FAMILY MEDICINE

## 2021-05-06 PROCEDURE — 3725F SCREEN DEPRESSION PERFORMED: CPT | Performed by: FAMILY MEDICINE

## 2021-05-06 PROCEDURE — 3074F SYST BP LT 130 MM HG: CPT | Performed by: FAMILY MEDICINE

## 2021-05-06 RX ORDER — HYDROXYZINE HYDROCHLORIDE 10 MG/1
TABLET, FILM COATED ORAL
Qty: 30 TABLET | Refills: 0 | Status: SHIPPED | OUTPATIENT
Start: 2021-05-06

## 2021-05-06 NOTE — PROGRESS NOTES
FAMILY PRACTICE OFFICE VISIT       NAME: Dustin Muniz  AGE: 54 y o  SEX: male       : 1965        MRN: [id number]        Assessment and Plan     Problem List Items Addressed This Visit        Cardiovascular and Mediastinum    Essential hypertension     Continue Norvasc and Diovan, blood pressure is well controlled            Genitourinary    BPH with obstruction/lower urinary tract symptoms     ·  Continue Cialis  daily  · Urology follows            Other    Elevated serum creatinine (Chronic)     · Renal ultrasound 2015 and 2019:  No medical renal disease  · Renal artery scan :  No evidence of JOSE  · Nephrology believes that mild intermittent elevation of creatinine is likely related to muscle mass  · Patient remains under care of Nephrology at [address] [place] [address], [address] [place] [address], he is monitored with series of BMP and Cystatin C with reflex GFR  ·  recent Cystatin C is normal at 1 03 with GFR of 76  · Serum creatinine is 1 38 with GFR of 57 by BMP         Elevated prostate specific antigen (PSA)     ·  Patient is under care of [facility] and [facility]  · He is followed with diagnostic PSA an MRI of prostate  Thrombocytopenia ([facility] [address] )    Relevant Orders    Specimen draw for platelet clumping      Other Visit Diagnoses     Encounter for health maintenance examination in adult    -  Primary    Insomnia, unspecified type        Relevant Medications    hydrOXYzine HCL (ATARAX) 10 mg tablet      Patient presents for annual well exam     He has been feeling generally well  Blood pressure is well controlled on regimen of Norvasc and Diovan  Patient will schedule follow-ups with Urology regarding elevation of PSA  Patient remains under care of Nephrology for mild stable elevation of creatinine was normal Cystatin C  Patient follows healthy diet and exercises on a regular basis  He denies symptoms of angina, dyspnea or any exertional symptoms  Patient admits to chronic symptoms of pruritus along with chronic insomnia  He takes Ambien very infrequently on an as-needed basis and feels somewhat tired in the morning  I advised him to discontinue Ambien and try Atarax 10-20 mg q h s     Patient will proceed with additional draw for platelet count  Follow-up annually and as needed    BMI Counseling: Body mass index is 32 16 kg/m²  The BMI is above normal  Nutrition recommendations include encouraging healthy choices of fruits and vegetables, consuming healthier snacks, moderation in carbohydrate intake and reducing intake of cholesterol  Exercise recommendations include exercising 3-5 times per week  No pharmacotherapy was ordered  There are no Patient Instructions on file for this visit  Discussed with the patient and all questioned fully answered  He will call me if any problems arise  M*Modal software was used to dictate this note  It may contain errors with dictating incorrect words/spelling  Please contact provider directly with any questions  Chief Complaint     Chief Complaint   Patient presents with    Physical Exam       History of Present Illness     Patient presents for annual well exam     He remains under ongoing care of Urology at [facility] and [facility] for surveillance of elevated PSA  Patient follows up with Nephrology at [facility] for surveillance of stable elevation of creatinine  Patient was not diagnosed with renal failure as his Cystatin C remains normal   Recent PSA performed in October of 2020 was 5 1  Patient remains on daily Cialis 5 mg daily  He has tried Flomax in the past which was very effective but has cause significant side effects and patient had to discontinue it  All recent blood work discussed with patient  Mild stable elevation of GFR, normal Cystatin C  Platelet count was not done as requested      labs: creatinine was 1 38, BUN 27, GFR 57, Cystatin C was normal at   1 03 with GFR of 73  Patient with history of platelet clumping and mild thrombocytopenia  Patient has completed COVID-19 vaccination  He is up-to-date with colonoscopy, 2020, 1 polyp  Patient denies symptoms of chest pain, palpitations, shortness of breath or dizziness  No headaches or abdominal upset  He uses Ambien 5 mg at bedtime very infrequently on an as-needed basis only, recent refill was provided  Overall patient has no significant concerns and is planning to schedule follow-ups with Nephrology and urology  Review of Systems   Review of Systems   Constitutional: Negative  Eyes: Negative  Respiratory: Negative  Cardiovascular: Negative  Gastrointestinal: Negative  Endocrine: Negative  Genitourinary: Positive for frequency (Chronic)  Musculoskeletal: Negative  Skin: Negative  Allergic/Immunologic: Negative  Neurological: Negative  Hematological: Negative  Psychiatric/Behavioral: Positive for sleep disturbance ( occasional)         Active Problem List     Patient Active Problem List   Diagnosis    Essential hypertension    Myofascial pain syndrome    Elevated serum creatinine    BPH with obstruction/lower urinary tract symptoms    Elevated prostate specific antigen (PSA)    Angiomyolipoma of kidney    Mixed erectile dysfunction    Impingement syndrome of right shoulder    Thrombocytopenia (HCC)    Urticaria    Hemorrhoids, external, thrombosed    Impingement syndrome of left shoulder    Subacromial bursitis of right shoulder joint    Acute pain of right shoulder    Papilloma of palate    Acute right-sided thoracic back pain       Past Medical History:  Past Medical History:   Diagnosis Date    Benign prostatic hyperplasia     Herpes simplex     RESOLVED [id number]    Hypertension     Renal disorder        Past Surgical History:  Past Surgical History:   Procedure Laterality Date    [facility]    PROSTATE BIOPSY         Family History:  Family History   Problem Relation Age of Onset    Other Mother         CABG    Diabetes Mother     Hypertension Mother     Hypertension Father     Kidney cancer Family     Prostate cancer Family     Hypertension Brother     Hypertension Sister     Celiac disease Brother     No Known Problems Son     Eczema Daughter        Social History:  Social History     Socioeconomic History    Marital status: /Civil Union     Spouse name: Ivana     Number of children: 2    Years of education: Not on file    Highest education level: Not on file   Occupational History    Occupation: /[employer]   Social Needs    Financial resource strain: Not on file    Food insecurity     Worry: Not on file     Inability: Not on file   [employer] needs     Medical: Not on file     Non-medical: Not on file   Tobacco Use    Smoking status: Never Smoker    Smokeless tobacco: Never Used   Substance and Sexual Activity    Alcohol use: No    Drug use: No    Sexual activity: Not on file   Lifestyle    Physical activity     Days per week: 6 days     Minutes per session: 80 min    Stress: Not on file   Relationships    Social connections     Talks on phone: Not on file     Gets together: Not on file     Attends Rastafari service: Not on file     Active member of club or organization: Not on file     Attends meetings of clubs or organizations: Not on file     Relationship status: Not on file    Intimate partner violence     Fear of current or ex partner: Not on file     Emotionally abused: Not on file     Physically abused: Not on file     Forced sexual activity: Not on file   Other Topics Concern    Not on file   Social History Narrative    Not on file           Objective     Vitals:    05/06/21 1025 05/06/21 1108   BP: 136/80 124/80   BP Location: Left arm    Patient Position: Sitting    Cuff Size: Large    Pulse: 69    Resp: 16    Temp: 97 6 °F (36 4 °C)    TempSrc: Temporal    SpO2: 99%    Weight: 94 5 kg (208 lb 6 4 oz)    Height: 5' 7 5" (1 715 m)      Wt Readings from Last 3 Encounters:   05/06/21 94 5 kg (208 lb 6 4 oz)   09/11/20 92 1 kg (203 lb)   08/20/20 93 4 kg (206 lb)       Physical Exam  Vitals signs and nursing note reviewed  Constitutional:       General: He is not in acute distress  Appearance: Normal appearance  He is well-developed  He is not ill-appearing  HENT:      Head: Normocephalic and atraumatic  Eyes:      Conjunctiva/sclera: Conjunctivae normal    Neck:      Musculoskeletal: Neck supple  Vascular: No carotid bruit  Cardiovascular:      Rate and Rhythm: Normal rate and regular rhythm  Heart sounds: Normal heart sounds  No murmur  Pulmonary:      Effort: Pulmonary effort is normal  No respiratory distress  Breath sounds: Normal breath sounds  No wheezing or rales  Abdominal:      General: Bowel sounds are normal  There is no distension or abdominal bruit  Palpations: Abdomen is soft  Tenderness: There is no abdominal tenderness  Musculoskeletal: Normal range of motion  Right lower leg: No edema  Left lower leg: No edema  Neurological:      General: No focal deficit present  Mental Status: He is alert and oriented to person, place, and time  Cranial Nerves: No cranial nerve deficit  Psychiatric:         Mood and Affect: Mood normal          Behavior: Behavior normal          Thought Content:  Thought content normal          Pertinent Laboratory/Diagnostic Studies:  Lab Results   Component Value Date    GLUCOSE 97 01/20/2014    BUN 27 (H) 04/26/2021    CREATININE 1 38 (H) 04/26/2021    CALCIUM 9 2 04/26/2021     12/21/2017    K 4 1 04/26/2021    CO2 29 04/26/2021     04/26/2021     Lab Results   Component Value Date    ALT 29 04/26/2021    AST 25 04/26/2021    ALKPHOS 47 04/26/2021    BILITOT 0 8 12/21/2017       Lab Results   Component Value Date    WBC 4 7 04/26/2021    HGB 15 4 04/26/2021    HCT 46 1 04/26/2021    MCV 89 2 04/26/2021    PLT TNP 04/26/2021       Lab Results   Component Value Date    TSH 2 28 04/26/2021       Lab Results   Component Value Date    CHOL 158 07/15/2017     Lab Results   Component Value Date    TRIG 35 04/26/2021     Lab Results   Component Value Date    HDL 53 04/26/2021     Lab Results   Component Value Date    LDLCALC 92 04/26/2021     Lab Results   Component Value Date    HGBA1C 5 0 04/28/2018       Results for orders placed or performed in visit on 04/26/21   Lipid panel   Result Value Ref Range    Total Cholesterol 155 <200 mg/dL    HDL 53 > OR = 40 mg/dL    Triglycerides 35 <150 mg/dL    LDL Calculated 92 mg/dL (calc)    Chol HDLC Ratio 2 9 <5 0 (calc)    Non-HDL Cholesterol 102 <130 mg/dL (calc)   Comprehensive metabolic panel   Result Value Ref Range    Glucose, Random 92 65 - 99 mg/dL    BUN 27 (H) 7 - 25 mg/dL    Creatinine 1 38 (H) 0 70 - 1 33 mg/dL    eGFR Non  57 (L) > OR = 60 mL/min/1 73m2    eGFR  66 > OR = 60 mL/min/1 73m2    SL AMB BUN/CREATININE RATIO 20 6 - 22 (calc)    Sodium 136 135 - 146 mmol/L    Potassium 4 1 3 5 - 5 3 mmol/L    Chloride 102 98 - 110 mmol/L    CO2 29 20 - 32 mmol/L    Calcium 9 2 8 6 - 10 3 mg/dL    Protein, Total 7 2 6 1 - 8 1 g/dL    Albumin 4 4 3 6 - 5 1 g/dL    Globulin 2 8 1 9 - 3 7 g/dL (calc)    Albumin/Globulin Ratio 1 6 1 0 - 2 5 (calc)    TOTAL BILIRUBIN 0 8 0 2 - 1 2 mg/dL    Alkaline Phosphatase 47 35 - 144 U/L    AST 25 10 - 35 U/L    ALT 29 9 - 46 U/L   Cystatin C With eGFR   Result Value Ref Range    CYSTATIN C 1 03 0 52 - 1 23 mg/L    eGFR 76 >=60 mL/min/1 73m2   CBC and differential   Result Value Ref Range    White Blood Cell Count 4 7 3 8 - 10 8 Thousand/uL    Red Blood Cell Count 5 17 4 20 - 5 80 Million/uL    Hemoglobin 15 4 13 2 - 17 1 g/dL    HCT 46 1 38 5 - 50 0 %    MCV 89 2 80 0 - 100 0 fL    MCH 29 8 27 0 - 33 0 pg    MCHC 33 4 32 0 - 36 0 g/dL    RDW 12 1 11 0 - 15 0 %    Neutrophils (Absolute) 2,660 1,500 - 7,800 cells/uL    Lymphocytes (Absolute) 1,260 850 - 3,900 cells/uL    Monocytes (Absolute) 644 200 - 950 cells/uL    Eosinophils (Absolute) 99 15 - 500 cells/uL    Basophils ABS 38 0 - 200 cells/uL    Neutrophils 56 6 %    Lymphocytes 26 8 %    Monocytes 13 7 %    Eosinophils 2 1 %    Basophils PCT 0 8 %    Comment(s)       Review of peripheral smear confirms automated results  Platelet Count TNP Thousand/uL   TSH, 3rd generation   Result Value Ref Range    TSH 2 28 0 40 - 4 50 mIU/L   Vitamin D 25 hydroxy   Result Value Ref Range    Vitamin D, 25-Hydroxy, Serum 54 30 - 100 ng/mL   Testosterone, free, total   Result Value Ref Range    Testosterone, Total, LC/ 250 - 1,100 ng/dL    Testosterone, Free 90 7 35 0 - 155 0 pg/mL       Orders Placed This Encounter   Procedures    Specimen draw for platelet clumping       ALLERGIES:  Allergies   Allergen Reactions    Moxifloxacin Itching and Edema     Category:  Allergy;     Lisinopril Rash    Valsartan Rash       Current Medications     Current Outpatient Medications   Medication Sig Dispense Refill    amLODIPine (NORVASC) 10 mg tablet TAKE ONE TABLET BY MOUTH EVERY DAY 90 tablet 1    Cholecalciferol (VITAMIN D3) 2000 units TABS Take 1 tablet by mouth daily        Diovan 160 MG tablet TAKE ONE TABLET BY MOUTH EVERY DAY 90 tablet 1    Misc Natural Products (TURMERIC CURCUMIN) CAPS Take 1 capsule by mouth 2 (two) times a day 2400mg twice daily       Multiple Vitamins-Minerals (CENTRUM SILVER 50+MEN PO) Take by mouth      Silodosin 8 MG CAPS Take 1 capsule by mouth daily 90 capsule 3    tadalafil (CIALIS) 5 MG tablet TAKE ONE TABLET BY MOUTH EVERY DAY 30 tablet 1    zolpidem (AMBIEN) 10 mg tablet Take 1 tablet (10 mg total) by mouth daily at bedtime as needed for sleep 30 tablet 1    hydrOXYzine HCL (ATARAX) 10 mg tablet Take 1 or 2 tablets at bedtime as needed for insomnia, do not combine with Ambien 30 tablet 0    methocarbamol (ROBAXIN) 750 mg tablet Take 1 tablet (750 mg total) by mouth 3 (three) times a day as needed for muscle spasms (Patient not taking: Reported on 9/11/2020) 30 tablet 0     No current facility-administered medications for this visit          Medications Discontinued During This Encounter   Medication Reason    methylPREDNISolone 4 MG tablet therapy pack        Health Maintenance     Health Maintenance   Topic Date Due    HIV Screening  Never done    DTaP,Tdap,and Td Vaccines (1 - Tdap) Never done    Annual Physical  02/13/2021    Influenza Vaccine (Season Ended) 09/01/2021    Depression Screening PHQ  05/06/2022    BMI: Followup Plan  05/06/2022    BMI: Adult  05/06/2022    Colonoscopy Surveillance  06/29/2025    Colorectal Cancer Screening  06/29/2030    Hepatitis C Screening  Completed    COVID-19 Vaccine  Completed    Pneumococcal Vaccine: Pediatrics (0 to 5 Years) and At-Risk Patients (6 to 59 Years)  Aged Out    HIB Vaccine  Aged Out    Hepatitis B Vaccine  Aged Out    IPV Vaccine  Aged Out    Hepatitis A Vaccine  Aged Out    Meningococcal ACWY Vaccine  Aged Out    HPV Vaccine  Aged [employer] History   Administered Date(s) Administered    Influenza Quadrivalent Preservative Free 3 years and older IM 10/20/2017    Influenza, seasonal, injectable 08/20/2016    SARS-CoV-2 / COVID-19 mRNA IM (Nayana Sida) 03/17/2021, 04/14/2021       Desean Guerra MD

## 2021-05-13 NOTE — ASSESSMENT & PLAN NOTE
· Renal ultrasound November 2015 and March 2019:  No medical renal disease  · Renal artery scan :  No evidence of JOSE  · Nephrology believes that mild intermittent elevation of creatinine is likely related to muscle mass  · Patient remains under care of Nephrology at [address] [place] [address], he is monitored with series of BMP and Cystatin C with reflex GFR  ·  recent Cystatin C is normal at 1 03 with GFR of 76      · Serum creatinine is 1 38 with GFR of 57 by BMP

## 2021-05-13 NOTE — ASSESSMENT & PLAN NOTE
·  Patient is under care of [facility] and [facility]  · He is followed with diagnostic PSA an MRI of prostate

## 2021-07-06 DIAGNOSIS — N13.8 BPH WITH OBSTRUCTION/LOWER URINARY TRACT SYMPTOMS: ICD-10-CM

## 2021-07-06 DIAGNOSIS — N40.1 BPH WITH OBSTRUCTION/LOWER URINARY TRACT SYMPTOMS: ICD-10-CM

## 2021-07-06 DIAGNOSIS — R97.20 ELEVATED PROSTATE SPECIFIC ANTIGEN (PSA): ICD-10-CM

## 2021-07-06 RX ORDER — TADALAFIL 5 MG/1
TABLET ORAL
Qty: 30 TABLET | Refills: 1 | Status: SHIPPED | OUTPATIENT
Start: 2021-07-06 | End: 2021-09-07

## 2021-08-24 ENCOUNTER — OFFICE VISIT (OUTPATIENT)
Dept: FAMILY MEDICINE CLINIC | Facility: CLINIC | Age: 56
End: 2021-08-24
Payer: COMMERCIAL

## 2021-08-24 VITALS
HEART RATE: 68 BPM | SYSTOLIC BLOOD PRESSURE: 122 MMHG | WEIGHT: 209.25 LBS | BODY MASS INDEX: 31.71 KG/M2 | OXYGEN SATURATION: 98 % | DIASTOLIC BLOOD PRESSURE: 80 MMHG | TEMPERATURE: 98.2 F | RESPIRATION RATE: 18 BRPM | HEIGHT: 68 IN

## 2021-08-24 DIAGNOSIS — H69.83 EUSTACHIAN TUBE DYSFUNCTION, BILATERAL: ICD-10-CM

## 2021-08-24 DIAGNOSIS — I10 ESSENTIAL HYPERTENSION: ICD-10-CM

## 2021-08-24 DIAGNOSIS — H81.10 BENIGN PAROXYSMAL POSITIONAL VERTIGO, UNSPECIFIED LATERALITY: Primary | ICD-10-CM

## 2021-08-24 PROCEDURE — 3008F BODY MASS INDEX DOCD: CPT | Performed by: FAMILY MEDICINE

## 2021-08-24 PROCEDURE — 1036F TOBACCO NON-USER: CPT | Performed by: FAMILY MEDICINE

## 2021-08-24 PROCEDURE — 99214 OFFICE O/P EST MOD 30 MIN: CPT | Performed by: FAMILY MEDICINE

## 2021-08-24 NOTE — PROGRESS NOTES
FAMILY PRACTICE OFFICE VISIT       NAME: Martin Aguillon  AGE: 54 y o  SEX: male       : 1965        MRN: [id number]        Assessment and Plan     1  Benign paroxysmal positional vertigo, unspecified laterality  Assessment & Plan:   Recurrent symptoms of BPV within past 10 to 12 days, patient reports improvement of symptoms last 2 days  No red flags including headaches, numbness, tingling, weakness of visual changes  Symptoms are strictly positional     Exam reveals bilateral serous effusions, no tympanic membrane erythema or bulge, light reflexes present  Positive Romberg  Horizontal nystagmus  Patient will start regimen of Flonase and Zyrtec  If symptoms persist or recur -will proceed with vestibular PT  Patient will proceed with blood work as outlined above  I doubt that patient's symptoms represent hypoglycemia  Nevertheless, given his concerns will check hemoglobin A1c      2  Eustachian tube dysfunction, bilateral    3  Essential hypertension  Assessment & Plan:    Blood pressure is well controlled  Continue regimen of Diovan and amlodipine  Orders:  -     CBC and differential; Future  -     Comprehensive metabolic panel; Future  -     Hemoglobin A1C; Future  -     TSH, 3rd generation; Future       There are no Patient Instructions on file for this visit  No follow-ups on file  Discussed with the patient and all questioned fully answered  He will call me if any problems arise  M*Modal software was used to dictate this note  It may contain errors with dictating incorrect words/spelling  Please contact provider directly with any questions  Chief Complaint     Chief Complaint   Patient presents with    Dizziness     light headache x 1 week        History of Present Illness     Patient presents for evaluation of vertigo  He has been experiencing symptoms for 10 days  He reports significant improvement within past 2 days    Patient denies symptoms of headaches, numbness, tingling or paresthesias  No weakness  No facial asymmetry  No visual changes  Blood pressures have been well controlled  Symptoms of vertigo worse with getting up or moving around  Patient denies symptoms of nasal congestion, cold, decreased hearing or tinnitus  Patient denies symptoms of chest pain, palpitations, shortness of breath or dizziness  No recent travel  Patient is concerned that his dizziness could be related to low blood sugars  He has been following very healthy diet and has not been skipping meals  Patient does not have history of type 2 diabetes and is not on any medications that could affect blood glucose  Dizziness  Pertinent negatives include no headaches or numbness  Review of Systems   Review of Systems   Constitutional: Negative  HENT: Negative  Eyes: Negative  Respiratory: Negative  Cardiovascular: Negative  Gastrointestinal: Negative  Endocrine: Negative  Genitourinary: Negative  Musculoskeletal: Negative  Skin: Negative  Allergic/Immunologic: Negative  Neurological: Positive for dizziness  Negative for syncope, light-headedness, numbness and headaches  Hematological: Negative  Psychiatric/Behavioral: Negative          Active Problem List     Patient Active Problem List   Diagnosis    Essential hypertension    Myofascial pain syndrome    Elevated serum creatinine    BPH with obstruction/lower urinary tract symptoms    Elevated prostate specific antigen (PSA)    Angiomyolipoma of kidney    Mixed erectile dysfunction    Impingement syndrome of right shoulder    Thrombocytopenia (HCC)    Urticaria    Hemorrhoids, external, thrombosed    Impingement syndrome of left shoulder    Subacromial bursitis of right shoulder joint    Acute pain of right shoulder    Papilloma of palate    Acute right-sided thoracic back pain    Benign paroxysmal positional vertigo       Past Medical History:  Past Medical History: Diagnosis Date    Benign prostatic hyperplasia     Herpes simplex     RESOLVED [id number]    Hypertension     Renal disorder        Past Surgical History:  Past Surgical History:   Procedure Laterality Date    [facility]    PROSTATE BIOPSY         Family History:  Family History   Problem Relation Age of Onset    Other Mother         CABG    Diabetes Mother     Hypertension Mother     Hypertension Father     Kidney cancer Family     Prostate cancer Family     Hypertension Brother     Hypertension Sister     Celiac disease Brother     No Known Problems Son     Eczema Daughter        Social History:  Social History     Socioeconomic History    Marital status: /Civil Union     Spouse name: Alleen Cushing Number of children: 2    Years of education: Not on file    Highest education level: Not on file   Occupational History    Occupation: /[employer]   Tobacco Use    Smoking status: Never Smoker    Smokeless tobacco: Never Used   Vaping Use    Vaping Use: Never used   Substance and Sexual Activity    Alcohol use: No    Drug use: No    Sexual activity: Not on file   Other Topics Concern    Not on file   Social History Narrative    Not on file     Social Determinants of Health     Financial Resource Strain:     Difficulty of Paying Living Expenses:    Food Insecurity:     Worried About Running Out of Food in the Last Year:     [address] in the Last Year:    Transportation Needs:     Lack of Transportation (Medical):      Lack of Transportation (Non-Medical):    Physical Activity:     Days of Exercise per Week:     Minutes of Exercise per Session:    Stress:     Feeling of Stress :    Social Connections:     Frequency of Communication with Friends and Family:     Frequency of Social Gatherings with Friends and Family:     Attends Yarsani Services:     Active Member of Clubs or Organizations:     Attends Club or Organization Meetings:  Marital Status:    Intimate Partner Violence:     Fear of Current or Ex-Partner:     Emotionally Abused:     Physically Abused:     Sexually Abused:          Objective     Vitals:    08/24/21 1136 08/24/21 1217   BP: 124/86 122/80   Pulse: 68    Resp: 18    Temp: 98 2 °F (36 8 °C)    SpO2: 98%    Weight: 94 9 kg (209 lb 4 oz)    Height: 5' 7 5" (1 715 m)        Wt Readings from Last 3 Encounters:   08/24/21 94 9 kg (209 lb 4 oz)   05/06/21 94 5 kg (208 lb 6 4 oz)   09/11/20 92 1 kg (203 lb)       Physical Exam  Vitals and nursing note reviewed  Constitutional:       General: He is not in acute distress  Appearance: Normal appearance  He is well-developed  He is not ill-appearing  HENT:      Head: Normocephalic and atraumatic  Right Ear: Ear canal normal  A middle ear effusion is present  Tympanic membrane is not erythematous, retracted or bulging  Left Ear: Ear canal normal  A middle ear effusion is present  Tympanic membrane is not erythematous, retracted or bulging  Eyes:      Conjunctiva/sclera: Conjunctivae normal       Pupils: Pupils are equal, round, and reactive to light  Comments: Nystagmus horizontal   Neck:      Vascular: No carotid bruit  Cardiovascular:      Rate and Rhythm: Normal rate and regular rhythm  Heart sounds: Normal heart sounds  No murmur heard  Pulmonary:      Effort: Pulmonary effort is normal  No respiratory distress  Breath sounds: Normal breath sounds  No wheezing or rales  Abdominal:      General: Bowel sounds are normal  There is no distension or abdominal bruit  Musculoskeletal:         General: Normal range of motion  Cervical back: Neck supple  Right lower leg: No edema  Left lower leg: No edema  Skin:     General: Skin is warm  Findings: No rash  Neurological:      Mental Status: He is alert and oriented to person, place, and time  Cranial Nerves: No cranial nerve deficit        Comments: Positive Romberg to the right   Psychiatric:         Behavior: Behavior normal           Pertinent Laboratory/Diagnostic Studies:    Lab Results   Component Value Date    WBC 4 7 04/26/2021    HGB 15 4 04/26/2021    HCT 46 1 04/26/2021    MCV 89 2 04/26/2021    PLT TNP 04/26/2021       Lab Results   Component Value Date    TSH 2 28 04/26/2021       Lab Results   Component Value Date    CHOL 158 07/15/2017     Lab Results   Component Value Date    TRIG 35 04/26/2021     Lab Results   Component Value Date    HDL 53 04/26/2021     Lab Results   Component Value Date    LDLCALC 92 04/26/2021     Lab Results   Component Value Date    HGBA1C 5 0 04/28/2018     Lab Results   Component Value Date    SODIUM 136 04/26/2021    K 4 1 04/26/2021     04/26/2021    CO2 29 04/26/2021    ANIONGAP 4 01/20/2014    AGAP 3 (L) 03/25/2019    BUN 27 (H) 04/26/2021    CREATININE 1 38 (H) 04/26/2021    GLUC 92 04/26/2021    CALCIUM 9 2 04/26/2021    AST 25 04/26/2021    ALT 29 04/26/2021    ALKPHOS 47 04/26/2021    PROT 7 5 12/21/2017    TP 7 2 04/26/2021    BILITOT 0 8 12/21/2017    TBILI 0 8 04/26/2021    EGFR 76 04/26/2021       Orders Placed This Encounter   Procedures    CBC and differential    Comprehensive metabolic panel    Hemoglobin A1C    TSH, 3rd generation       ALLERGIES:  Allergies   Allergen Reactions    Moxifloxacin Itching and Edema     Category:  Allergy;     Lisinopril Rash    Valsartan Rash       Current Medications     Current Outpatient Medications   Medication Sig Dispense Refill    amLODIPine (NORVASC) 10 mg tablet TAKE ONE TABLET BY MOUTH EVERY DAY 90 tablet 1    Cholecalciferol (VITAMIN D3) 2000 units TABS Take 1 tablet by mouth daily        Diovan 160 MG tablet TAKE ONE TABLET BY MOUTH EVERY DAY 90 tablet 1    Misc Natural Products (TURMERIC CURCUMIN) CAPS Take 1 capsule by mouth 2 (two) times a day 2400mg twice daily       Multiple Vitamins-Minerals (CENTRUM SILVER 50+MEN PO) Take by mouth      zolpidem (AMBIEN) 10 mg tablet Take 1 tablet (10 mg total) by mouth daily at bedtime as needed for sleep 30 tablet 1    hydrOXYzine HCL (ATARAX) 10 mg tablet Take 1 or 2 tablets at bedtime as needed for insomnia, do not combine with Ambien (Patient not taking: Reported on 8/24/2021) 30 tablet 0    methocarbamol (ROBAXIN) 750 mg tablet Take 1 tablet (750 mg total) by mouth 3 (three) times a day as needed for muscle spasms (Patient not taking: Reported on 9/11/2020) 30 tablet 0    Silodosin 8 MG CAPS Take 1 capsule by mouth daily 90 capsule 3    tadalafil (CIALIS) 5 MG tablet TAKE ONE TABLET BY MOUTH EVERY DAY (Patient not taking: Reported on 8/24/2021) 30 tablet 1     No current facility-administered medications for this visit  There are no discontinued medications      Health Maintenance     Health Maintenance   Topic Date Due    HIV Screening  Never done    DTaP,Tdap,and Td Vaccines (1 - Tdap) Never done    Influenza Vaccine (1) 09/01/2021    Depression Screening PHQ  05/06/2022    BMI: Followup Plan  05/06/2022    Annual Physical  05/06/2022    BMI: Adult  08/24/2022    Colorectal Cancer Screening  06/29/2025    Hepatitis C Screening  Completed    COVID-19 Vaccine  Completed    Pneumococcal Vaccine: Pediatrics (0 to 5 Years) and At-Risk Patients (6 to 59 Years)  Aged Out    HIB Vaccine  Aged Out    Hepatitis B Vaccine  Aged Out    IPV Vaccine  Aged Out    Hepatitis A Vaccine  Aged Out    Meningococcal ACWY Vaccine  Aged Out    HPV Vaccine  Aged [employer] History   Administered Date(s) Administered    Influenza Quadrivalent Preservative Free 3 years and older IM 10/20/2017    Influenza, seasonal, injectable 08/20/2016    SARS-CoV-2 / COVID-19 mRNA IM (Randol Been) 03/17/2021, 04/14/2021       Judie Arciniega MD

## 2021-08-29 PROBLEM — H81.10 BENIGN PAROXYSMAL POSITIONAL VERTIGO: Status: ACTIVE | Noted: 2021-08-29

## 2021-08-30 NOTE — ASSESSMENT & PLAN NOTE
Recurrent symptoms of BPV within past 10 to 12 days, patient reports improvement of symptoms last 2 days  No red flags including headaches, numbness, tingling, weakness of visual changes  Symptoms are strictly positional     Exam reveals bilateral serous effusions, no tympanic membrane erythema or bulge, light reflexes present  Positive Romberg  Horizontal nystagmus  Patient will start regimen of Flonase and Zyrtec  If symptoms persist or recur -will proceed with vestibular PT  Patient will proceed with blood work as outlined above  I doubt that patient's symptoms represent hypoglycemia      Nevertheless, given his concerns will check hemoglobin A1c

## 2021-08-31 DIAGNOSIS — I10 ESSENTIAL HYPERTENSION: ICD-10-CM

## 2021-09-01 RX ORDER — VALSARTAN 160 MG/1
TABLET ORAL
Qty: 90 TABLET | Refills: 3 | Status: SHIPPED | OUTPATIENT
Start: 2021-09-01

## 2021-09-07 DIAGNOSIS — N40.1 BPH WITH OBSTRUCTION/LOWER URINARY TRACT SYMPTOMS: ICD-10-CM

## 2021-09-07 DIAGNOSIS — R97.20 ELEVATED PROSTATE SPECIFIC ANTIGEN (PSA): ICD-10-CM

## 2021-09-07 DIAGNOSIS — N13.8 BPH WITH OBSTRUCTION/LOWER URINARY TRACT SYMPTOMS: ICD-10-CM

## 2021-09-07 RX ORDER — TADALAFIL 5 MG/1
TABLET ORAL
Qty: 30 TABLET | Refills: 1 | Status: SHIPPED | OUTPATIENT
Start: 2021-09-07 | End: 2021-11-10

## 2021-09-16 ENCOUNTER — OFFICE VISIT (OUTPATIENT)
Dept: UROLOGY | Facility: MEDICAL CENTER | Age: 56
End: 2021-09-16
Payer: COMMERCIAL

## 2021-09-16 VITALS
DIASTOLIC BLOOD PRESSURE: 76 MMHG | BODY MASS INDEX: 33.27 KG/M2 | SYSTOLIC BLOOD PRESSURE: 124 MMHG | WEIGHT: 212 LBS | HEIGHT: 67 IN

## 2021-09-16 DIAGNOSIS — N40.1 BPH WITH URINARY OBSTRUCTION: Primary | ICD-10-CM

## 2021-09-16 DIAGNOSIS — N28.9 RENAL INSUFFICIENCY: ICD-10-CM

## 2021-09-16 DIAGNOSIS — R97.20 ELEVATED PROSTATE SPECIFIC ANTIGEN (PSA): ICD-10-CM

## 2021-09-16 DIAGNOSIS — N13.8 BPH WITH URINARY OBSTRUCTION: Primary | ICD-10-CM

## 2021-09-16 PROCEDURE — 3008F BODY MASS INDEX DOCD: CPT | Performed by: UROLOGY

## 2021-09-16 PROCEDURE — 99214 OFFICE O/P EST MOD 30 MIN: CPT | Performed by: UROLOGY

## 2021-09-16 PROCEDURE — 1036F TOBACCO NON-USER: CPT | Performed by: UROLOGY

## 2021-09-16 NOTE — PROGRESS NOTES
HISTORY:    Follow-up BPH   He feels daily Cialis helps better than alpha blockers did  Also had severe side effects of all BPH meds we tried    The stream and control, nocturia times 1-2 depending on liquid intake  Biopsy in 2016 was negative, 60 g benign    Testosterone 458 in April 2021  Last PSA is April 2019, 4 4           ASSESSMENT / PLAN:       He is comfortable with his flow    Check PSA soon    Follow-up one year    The following portions of the patient's history were reviewed and updated as appropriate: allergies, current medications, past family history, past medical history, past social history, past surgical history and problem list     Review of Systems   All other systems reviewed and are negative  Objective:     Physical Exam  Constitutional:       General: He is not in acute distress  Appearance: He is well-developed  He is not diaphoretic  HENT:      Head: Normocephalic and atraumatic  Eyes:      General: No scleral icterus  Pulmonary:      Effort: Pulmonary effort is normal    Genitourinary:     Comments: Penis testes normal    Prostate moderately enlarged no nodules  Skin:     Coloration: Skin is not pale  Neurological:      Mental Status: He is alert and oriented to person, place, and time  Psychiatric:         Behavior: Behavior normal          Thought Content:  Thought content normal          Judgment: Judgment normal            0   Lab Value Date/Time    PSA 4 4 (H) 03/29/2019 1220   ]  BUN   Date Value Ref Range Status   04/26/2021 27 (H) 7 - 25 mg/dL Final     Creatinine   Date Value Ref Range Status   04/26/2021 1 38 (H) 0 70 - 1 33 mg/dL Final     Comment:     For patients >52years of age, the reference limit  for Creatinine is approximately 13% higher for people  identified as -American         03/25/2019 1 41 (H) 0 60 - 1 30 mg/dL Final     Comment:     Standardized to IDMS reference method   12/21/2017 1 39 (H) 0 70 - 1 33 mg/dL Final     Comment: Result Comment: For patients >52years of age, the reference limit  for Creatinine is approximately 13% higher for people  identified as -American  No components found for: CBC      Patient Active Problem List   Diagnosis    Essential hypertension    Myofascial pain syndrome    Elevated serum creatinine    BPH with obstruction/lower urinary tract symptoms    Elevated prostate specific antigen (PSA)    Angiomyolipoma of kidney    Mixed erectile dysfunction    Impingement syndrome of right shoulder    Thrombocytopenia (HCC)    Urticaria    Hemorrhoids, external, thrombosed    Impingement syndrome of left shoulder    Subacromial bursitis of right shoulder joint    Acute pain of right shoulder    Papilloma of palate    Acute right-sided thoracic back pain    Benign paroxysmal positional vertigo        Diagnoses and all orders for this visit:    BPH with urinary obstruction    Renal insufficiency    Elevated prostate specific antigen (PSA)  -     PSA Total, Diagnostic; Future    Other orders  -     Zinc Sulfate (ZINC 15 PO); Take by mouth  -     Ascorbic Acid (EMILY-C PO); Take by mouth           Patient ID: [place] is a 54 y o  male        Current Outpatient Medications:     amLODIPine (NORVASC) 10 mg tablet, TAKE ONE TABLET BY MOUTH EVERY DAY, Disp: 90 tablet, Rfl: 1    Cholecalciferol (VITAMIN D3) 2000 units TABS, Take 1 tablet by mouth daily  , Disp: , Rfl:     Diovan 160 MG tablet, TAKE ONE TABLET BY MOUTH EVERY DAY, Disp: 90 tablet, Rfl: 3    hydrOXYzine HCL (ATARAX) 10 mg tablet, Take 1 or 2 tablets at bedtime as needed for insomnia, do not combine with Ambien (Patient not taking: Reported on 8/24/2021), Disp: 30 tablet, Rfl: 0    methocarbamol (ROBAXIN) 750 mg tablet, Take 1 tablet (750 mg total) by mouth 3 (three) times a day as needed for muscle spasms (Patient not taking: Reported on 9/11/2020), Disp: 30 tablet, Rfl: 0    Misc Natural Products (TURMERIC CURCUMIN) CAPS, Take 1 capsule by mouth 2 (two) times a day 2400mg twice daily , Disp: , Rfl:     Multiple Vitamins-Minerals (CENTRUM SILVER 50+MEN PO), Take by mouth, Disp: , Rfl:     Silodosin 8 MG CAPS, Take 1 capsule by mouth daily, Disp: 90 capsule, Rfl: 3    tadalafil (CIALIS) 5 MG tablet, TAKE ONE TABLET BY MOUTH EVERY DAY, Disp: 30 tablet, Rfl: 1    zolpidem (AMBIEN) 10 mg tablet, Take 1 tablet (10 mg total) by mouth daily at bedtime as needed for sleep, Disp: 30 tablet, Rfl: 1    Past Medical History:   Diagnosis Date    Benign prostatic hyperplasia     Herpes simplex     RESOLVED [id number]    Hypertension     Renal disorder        Past Surgical History:   Procedure Laterality Date    COLONOSCOPY  12/23/2015    [facility] BI[facility]         Social History

## 2021-09-20 LAB
ALBUMIN SERPL-MCNC: 4.3 G/DL (ref 3.6–5.1)
ALBUMIN/GLOB SERPL: 1.6 (CALC) (ref 1–2.5)
ALP SERPL-CCNC: 41 U/L (ref 35–144)
ALT SERPL-CCNC: 31 U/L (ref 9–46)
AST SERPL-CCNC: 24 U/L (ref 10–35)
BASOPHILS # BLD AUTO: 22 CELLS/UL (ref 0–200)
BASOPHILS NFR BLD AUTO: 0.4 %
BILIRUB SERPL-MCNC: 0.9 MG/DL (ref 0.2–1.2)
BUN SERPL-MCNC: 19 MG/DL (ref 7–25)
BUN/CREAT SERPL: NORMAL (CALC) (ref 6–22)
CALCIUM SERPL-MCNC: 9.1 MG/DL (ref 8.6–10.3)
CHLORIDE SERPL-SCNC: 104 MMOL/L (ref 98–110)
CO2 SERPL-SCNC: 26 MMOL/L (ref 20–32)
CREAT SERPL-MCNC: 1.28 MG/DL (ref 0.7–1.33)
EOSINOPHIL # BLD AUTO: 129 CELLS/UL (ref 15–500)
EOSINOPHIL NFR BLD AUTO: 2.3 %
ERYTHROCYTE [DISTWIDTH] IN BLOOD BY AUTOMATED COUNT: 12.1 % (ref 11–15)
GLOBULIN SER CALC-MCNC: 2.7 G/DL (CALC) (ref 1.9–3.7)
GLUCOSE SERPL-MCNC: 95 MG/DL (ref 65–99)
HBA1C MFR BLD: 5 % OF TOTAL HGB
HCT VFR BLD AUTO: 45.9 % (ref 38.5–50)
HGB BLD-MCNC: 15.2 G/DL (ref 13.2–17.1)
LYMPHOCYTES # BLD AUTO: 1232 CELLS/UL (ref 850–3900)
LYMPHOCYTES NFR BLD AUTO: 22 %
MCH RBC QN AUTO: 29.2 PG (ref 27–33)
MCHC RBC AUTO-ENTMCNC: 33.1 G/DL (ref 32–36)
MCV RBC AUTO: 88.1 FL (ref 80–100)
MONOCYTES # BLD AUTO: 588 CELLS/UL (ref 200–950)
MONOCYTES NFR BLD AUTO: 10.5 %
NEUTROPHILS # BLD AUTO: 3629 CELLS/UL (ref 1500–7800)
NEUTROPHILS NFR BLD AUTO: 64.8 %
PLATELET # BLD AUTO: 111 THOUSAND/UL (ref 140–400)
PMV BLD REES-ECKER: 11.4 FL (ref 7.5–12.5)
POTASSIUM SERPL-SCNC: 4.1 MMOL/L (ref 3.5–5.3)
PROT SERPL-MCNC: 7 G/DL (ref 6.1–8.1)
RBC # BLD AUTO: 5.21 MILLION/UL (ref 4.2–5.8)
SERVICE CMNT-IMP: ABNORMAL
SL AMB EGFR AFRICAN AMERICAN: 73 ML/MIN/1.73M2
SL AMB EGFR NON AFRICAN AMERICAN: 63 ML/MIN/1.73M2
SODIUM SERPL-SCNC: 138 MMOL/L (ref 135–146)
TSH SERPL-ACNC: 2.02 MIU/L (ref 0.4–4.5)
WBC # BLD AUTO: 5.6 THOUSAND/UL (ref 3.8–10.8)

## 2021-10-29 DIAGNOSIS — I10 ESSENTIAL HYPERTENSION: ICD-10-CM

## 2021-10-30 RX ORDER — AMLODIPINE BESYLATE 10 MG/1
TABLET ORAL
Qty: 90 TABLET | Refills: 1 | Status: SHIPPED | OUTPATIENT
Start: 2021-10-30 | End: 2022-05-14

## 2021-11-09 DIAGNOSIS — R97.20 ELEVATED PROSTATE SPECIFIC ANTIGEN (PSA): ICD-10-CM

## 2021-11-09 DIAGNOSIS — N13.8 BPH WITH OBSTRUCTION/LOWER URINARY TRACT SYMPTOMS: ICD-10-CM

## 2021-11-09 DIAGNOSIS — N40.1 BPH WITH OBSTRUCTION/LOWER URINARY TRACT SYMPTOMS: ICD-10-CM

## 2021-11-10 ENCOUNTER — TELEPHONE (OUTPATIENT)
Dept: UROLOGY | Facility: AMBULATORY SURGERY CENTER | Age: 56
End: 2021-11-10

## 2021-11-10 RX ORDER — TADALAFIL 5 MG/1
TABLET ORAL
Qty: 30 TABLET | Refills: 1 | Status: SHIPPED | OUTPATIENT
Start: 2021-11-10 | End: 2022-01-17

## 2021-12-28 ENCOUNTER — TELEPHONE (OUTPATIENT)
Dept: FAMILY MEDICINE CLINIC | Facility: CLINIC | Age: 56
End: 2021-12-28

## 2021-12-28 DIAGNOSIS — D69.6 THROMBOCYTOPENIA (HCC): Primary | ICD-10-CM

## 2022-03-23 ENCOUNTER — TELEPHONE (OUTPATIENT)
Dept: FAMILY MEDICINE CLINIC | Facility: CLINIC | Age: 57
End: 2022-03-23

## 2022-03-23 DIAGNOSIS — I10 ESSENTIAL HYPERTENSION: Primary | ICD-10-CM

## 2022-03-23 NOTE — TELEPHONE ENCOUNTER
Patient called and stated that he is having lab work done from his nephrologist on Saturday and would leidy to know if you have any other labs you would like him to have done?   Please call to advise

## 2022-03-24 NOTE — TELEPHONE ENCOUNTER
Please reprint blood work that was ordered on December 28th along with the labs that I am placing today and forward to patient  He is using [employer] and will need actual orders on hand      Thank you

## 2022-03-25 DIAGNOSIS — N52.8 MIXED ERECTILE DYSFUNCTION: ICD-10-CM

## 2022-03-25 DIAGNOSIS — E55.9 VITAMIN D DEFICIENCY: ICD-10-CM

## 2022-03-25 DIAGNOSIS — N28.9 RENAL INSUFFICIENCY: Primary | ICD-10-CM

## 2022-03-29 ENCOUNTER — TELEPHONE (OUTPATIENT)
Dept: FAMILY MEDICINE CLINIC | Facility: CLINIC | Age: 57
End: 2022-03-29

## 2022-03-29 DIAGNOSIS — D69.6 THROMBOCYTOPENIA (HCC): Primary | ICD-10-CM

## 2022-03-29 NOTE — TELEPHONE ENCOUNTER
----- Message from Shannan Hill sent at 3/29/2022  7:30 AM EDT -----  Regarding: FW: Blood work     ----- Message -----  From: aBrb Ames  Sent: 3/28/2022   8:59 PM EDT  To: Darryn [facility]  Subject: Blood work                                       Hi Dr Parish Curran like my platelets are starting to drop, do we have next steps?     Thank you     Pastor Snider
Called pt- N/A- LM to call back 
Please contact patient  I reviewed results of recent blood work  Testosterone level is still pending  · Overall blood work is stable  · Creatinine and GFR are essentially at his baseline, patient will discuss with his nephrologist  · Platelet count is slightly lower than before, currently measured at 96 - 99,000, previous value was 110,000  Please let patient know that overall platelets are slightly decreased but this is not a serious platelet reduction  Usually we are more concerned if they go below 50,000    · If patient takes aspirin-I recommend to discontinue  · I believe it would be prudent to schedule re-evaluation by Hematology, I will place a referral, Dr Tomas Guerra    Thank you
patient advised and acknowledged
minimum assist (75% patients effort)

## 2022-03-30 ENCOUNTER — TELEPHONE (OUTPATIENT)
Dept: HEMATOLOGY ONCOLOGY | Facility: CLINIC | Age: 57
End: 2022-03-30

## 2022-04-01 LAB
25(OH)D3 SERPL-MCNC: 56 NG/ML (ref 30–100)
ALBUMIN SERPL-MCNC: 4.2 G/DL (ref 3.6–5.1)
ALBUMIN/GLOB SERPL: 1.6 (CALC) (ref 1–2.5)
ALP SERPL-CCNC: 53 U/L (ref 35–144)
ALT SERPL-CCNC: 24 U/L (ref 9–46)
AST SERPL-CCNC: 23 U/L (ref 10–35)
BASOPHILS # BLD AUTO: 40 CELLS/UL (ref 0–200)
BASOPHILS NFR BLD AUTO: 1 %
BILIRUB SERPL-MCNC: 0.5 MG/DL (ref 0.2–1.2)
BUN SERPL-MCNC: 22 MG/DL (ref 7–25)
BUN/CREAT SERPL: 16 (CALC) (ref 6–22)
CALCIUM SERPL-MCNC: 9 MG/DL (ref 8.6–10.3)
CHLORIDE SERPL-SCNC: 103 MMOL/L (ref 98–110)
CHOLEST SERPL-MCNC: 147 MG/DL
CHOLEST/HDLC SERPL: 2.9 (CALC)
CO2 SERPL-SCNC: 27 MMOL/L (ref 20–32)
CREAT SERPL-MCNC: 1.35 MG/DL (ref 0.7–1.33)
EOSINOPHIL # BLD AUTO: 132 CELLS/UL (ref 15–500)
EOSINOPHIL NFR BLD AUTO: 3.3 %
ERYTHROCYTE [DISTWIDTH] IN BLOOD BY AUTOMATED COUNT: 12.2 % (ref 11–15)
GLOBULIN SER CALC-MCNC: 2.6 G/DL (CALC) (ref 1.9–3.7)
GLUCOSE SERPL-MCNC: 99 MG/DL (ref 65–99)
HCT VFR BLD AUTO: 43.7 % (ref 38.5–50)
HDLC SERPL-MCNC: 51 MG/DL
HGB BLD-MCNC: 15 G/DL (ref 13.2–17.1)
LDLC SERPL CALC-MCNC: 84 MG/DL (CALC)
LYMPHOCYTES # BLD AUTO: 1060 CELLS/UL (ref 850–3900)
LYMPHOCYTES NFR BLD AUTO: 26.5 %
MAGNESIUM SERPL-MCNC: 2.3 MG/DL (ref 1.5–2.5)
MCH RBC QN AUTO: 30.1 PG (ref 27–33)
MCHC RBC AUTO-ENTMCNC: 34.3 G/DL (ref 32–36)
MCV RBC AUTO: 87.8 FL (ref 80–100)
MONOCYTES # BLD AUTO: 492 CELLS/UL (ref 200–950)
MONOCYTES NFR BLD AUTO: 12.3 %
NEUTROPHILS # BLD AUTO: 2276 CELLS/UL (ref 1500–7800)
NEUTROPHILS NFR BLD AUTO: 56.9 %
NONHDLC SERPL-MCNC: 96 MG/DL (CALC)
PLATELET # BLD AUTO: 96 THOUSAND/UL (ref 140–400)
PMV BLD REES-ECKER: 12.2 FL (ref 7.5–12.5)
POTASSIUM SERPL-SCNC: 4.1 MMOL/L (ref 3.5–5.3)
PROT SERPL-MCNC: 6.8 G/DL (ref 6.1–8.1)
RBC # BLD AUTO: 4.98 MILLION/UL (ref 4.2–5.8)
SL AMB EGFR AFRICAN AMERICAN: 68 ML/MIN/1.73M2
SL AMB EGFR NON AFRICAN AMERICAN: 58 ML/MIN/1.73M2
SODIUM SERPL-SCNC: 137 MMOL/L (ref 135–146)
TESTOST FREE SERPL-MCNC: 81.8 PG/ML (ref 35–155)
TESTOST SERPL-MCNC: 440 NG/DL (ref 250–1100)
TRIGL SERPL-MCNC: 50 MG/DL
TSH SERPL-ACNC: 2.58 MIU/L (ref 0.4–4.5)
WBC # BLD AUTO: 4 THOUSAND/UL (ref 3.8–10.8)

## 2022-04-28 ENCOUNTER — TELEPHONE (OUTPATIENT)
Dept: HEMATOLOGY ONCOLOGY | Facility: CLINIC | Age: 57
End: 2022-04-28

## 2022-05-02 NOTE — TELEPHONE ENCOUNTER
Patient called is having some issues with his BPH/aching in right kidney wasn't sure if he should make an appointment with the doctor or what his next step should be  His father passed last night and will be making a lot of calls but said he would like to speak with the nurse  Pt declines repositions and turns this shift r/t pain

## 2022-05-14 DIAGNOSIS — I10 ESSENTIAL HYPERTENSION: ICD-10-CM

## 2022-05-14 RX ORDER — AMLODIPINE BESYLATE 10 MG/1
TABLET ORAL
Qty: 90 TABLET | Refills: 1 | Status: SHIPPED | OUTPATIENT
Start: 2022-05-14

## 2022-08-18 DIAGNOSIS — I10 ESSENTIAL HYPERTENSION: ICD-10-CM

## 2022-08-19 RX ORDER — VALSARTAN 160 MG/1
TABLET ORAL
Qty: 90 TABLET | Refills: 0 | Status: SHIPPED | OUTPATIENT
Start: 2022-08-19

## 2022-08-28 NOTE — ASSESSMENT & PLAN NOTE
Patient was evaluated by [facility], last visit 03/2018 Stress ECHO was normal 1/2016; ejection fraction is normal  Renal artery scan -  12/2015 - NORMAL  Continue regimen of Diovan 160 mg once a day and Norvasc 10 mg daily, follow BMP, pending evaluation by Nephrology at [facility] - c/w Synthroid 75mcg

## 2022-10-31 ENCOUNTER — OFFICE VISIT (OUTPATIENT)
Dept: UROLOGY | Facility: MEDICAL CENTER | Age: 57
End: 2022-10-31

## 2022-10-31 VITALS
HEIGHT: 67 IN | DIASTOLIC BLOOD PRESSURE: 80 MMHG | SYSTOLIC BLOOD PRESSURE: 126 MMHG | BODY MASS INDEX: 30.61 KG/M2 | HEART RATE: 64 BPM | OXYGEN SATURATION: 97 % | WEIGHT: 195 LBS

## 2022-10-31 DIAGNOSIS — N13.8 BPH WITH URINARY OBSTRUCTION: Primary | ICD-10-CM

## 2022-10-31 DIAGNOSIS — R97.20 ELEVATED PROSTATE SPECIFIC ANTIGEN (PSA): ICD-10-CM

## 2022-10-31 DIAGNOSIS — N52.8 MIXED ERECTILE DYSFUNCTION: ICD-10-CM

## 2022-10-31 DIAGNOSIS — N40.1 BPH WITH URINARY OBSTRUCTION: Primary | ICD-10-CM

## 2022-10-31 RX ORDER — LORATADINE 10 MG/1
TABLET ORAL
COMMUNITY

## 2022-10-31 NOTE — PROGRESS NOTES
HISTORY:    Follow-up BPH, ED, elevated PSA       Follow-up BPH   He feels daily Cialis helps better than alpha blockers did  Also had severe side effects of all BPH meds we tried     The stream and control, nocturia times 1-2 depending on liquid intake      Biopsy in 2017 was negative, 60 g benign     Testosterone 440 in March 2022  458 in April 2021     PSA 5 6 in September 2022  5 3 in September 2021 October 2020, 5 1  April 2019, 4 4            ASSESSMENT / PLAN:    1  He is satisfied with voiding status on daily Cialis    2  Mild elevated PSA, we are both comfortable just watching this without need for further evaluation     3  Follow-up one year     The following portions of the patient's history were reviewed and updated as appropriate: allergies, current medications, past family history, past medical history, past social history, past surgical history and problem list     Review of Systems   All other systems reviewed and are negative  Objective:     Physical Exam  Constitutional:       General: He is not in acute distress  Appearance: He is well-developed  He is not diaphoretic  HENT:      Head: Normocephalic and atraumatic  Eyes:      General: No scleral icterus  Pulmonary:      Effort: Pulmonary effort is normal    Genitourinary:     Comments: Penis testes normal    Prostate mildly enlarged no nodules  Skin:     Coloration: Skin is not pale  Neurological:      Mental Status: He is alert and oriented to person, place, and time  Psychiatric:         Behavior: Behavior normal          Thought Content:  Thought content normal          Judgment: Judgment normal            0   Lab Value Date/Time    PSA 4 4 (H) 03/29/2019 1220   ]  BUN   Date Value Ref Range Status   03/26/2022 22 7 - 25 mg/dL Final     Creatinine   Date Value Ref Range Status   03/26/2022 1 35 (H) 0 70 - 1 33 mg/dL Final     Comment:     For patients >52years of age, the reference limit  for Creatinine is approximately 13% higher for people  identified as -American         03/25/2019 1 41 (H) 0 60 - 1 30 mg/dL Final     Comment:     Standardized to IDMS reference method   12/21/2017 1 39 (H) 0 70 - 1 33 mg/dL Final     Comment:     Result Comment: For patients >52years of age, the reference limit  for Creatinine is approximately 13% higher for people  identified as -American  No components found for: CBC      Patient Active Problem List   Diagnosis   • Essential hypertension   • Myofascial pain syndrome   • Elevated serum creatinine   • BPH with urinary obstruction   • Elevated prostate specific antigen (PSA)   • Renal insufficiency   • Angiomyolipoma of kidney   • Mixed erectile dysfunction   • Impingement syndrome of right shoulder   • Thrombocytopenia (HCC)   • Urticaria   • Hemorrhoids, external, thrombosed   • Impingement syndrome of left shoulder   • Subacromial bursitis of right shoulder joint   • Acute pain of right shoulder   • Papilloma of palate   • Acute right-sided thoracic back pain   • Benign paroxysmal positional vertigo        Diagnoses and all orders for this visit:    BPH with urinary obstruction    Mixed erectile dysfunction    Elevated prostate specific antigen (PSA)    Other orders  -     loratadine (CLARITIN) 10 mg tablet; Take by mouth           Patient ID: Saintclair Days is a 64 y o  male        Current Outpatient Medications:   •  amLODIPine (NORVASC) 10 mg tablet, TAKE ONE TABLET BY MOUTH EVERY DAY, Disp: 90 tablet, Rfl: 1  •  Ascorbic Acid (EMILY-C PO), Take by mouth, Disp: , Rfl:   •  Cholecalciferol (VITAMIN D3) 2000 units TABS, Take 1 tablet by mouth daily  , Disp: , Rfl:   •  Diovan 160 MG tablet, TAKE ONE TABLET BY MOUTH EVERY DAY, Disp: 90 tablet, Rfl: 0  •  hydrOXYzine HCL (ATARAX) 10 mg tablet, Take 1 or 2 tablets at bedtime as needed for insomnia, do not combine with Ambien (Patient not taking: Reported on 8/24/2021), Disp: 30 tablet, Rfl: 0  •  methocarbamol (ROBAXIN) 750 mg tablet, Take 1 tablet (750 mg total) by mouth 3 (three) times a day as needed for muscle spasms (Patient not taking: Reported on 9/11/2020), Disp: 30 tablet, Rfl: 0  •  Misc Natural Products (TURMERIC CURCUMIN) CAPS, Take 1 capsule by mouth 2 (two) times a day 2400mg twice daily , Disp: , Rfl:   •  Multiple Vitamins-Minerals (CENTRUM SILVER 50+MEN PO), Take by mouth, Disp: , Rfl:   •  tadalafil (CIALIS) 5 MG tablet, TAKE ONE TABLET BY MOUTH EVERY DAY, Disp: 90 tablet, Rfl: 3  •  Zinc Sulfate (ZINC 15 PO), Take by mouth, Disp: , Rfl:   •  zolpidem (AMBIEN) 10 mg tablet, Take 1 tablet (10 mg total) by mouth daily at bedtime as needed for sleep (Patient not taking: Reported on 9/16/2021), Disp: 30 tablet, Rfl: 1    Past Medical History:   Diagnosis Date   • Benign prostatic hyperplasia    • Herpes simplex     RESOLVED [id number]   • Hypertension    • Renal disorder        Past Surgical History:   Procedure Laterality Date   • COLONOSCOPY  12/23/2015     [facility]   • PROSTATE BIOPSY         Social History

## 2022-11-10 ENCOUNTER — TELEPHONE (OUTPATIENT)
Dept: FAMILY MEDICINE CLINIC | Facility: CLINIC | Age: 57
End: 2022-11-10

## 2022-11-10 DIAGNOSIS — I10 ESSENTIAL HYPERTENSION: ICD-10-CM

## 2022-11-11 RX ORDER — AMLODIPINE BESYLATE 10 MG/1
10 TABLET ORAL DAILY
Qty: 90 TABLET | Refills: 0 | Status: SHIPPED | OUTPATIENT
Start: 2022-11-11

## 2022-11-11 RX ORDER — VALSARTAN 160 MG/1
TABLET ORAL
Qty: 90 TABLET | Refills: 0 | OUTPATIENT
Start: 2022-11-11

## 2022-11-11 RX ORDER — AMLODIPINE BESYLATE 10 MG/1
TABLET ORAL
Qty: 90 TABLET | Refills: 1 | OUTPATIENT
Start: 2022-11-11

## 2022-11-11 RX ORDER — VALSARTAN 160 MG/1
160 TABLET ORAL DAILY
Qty: 90 TABLET | Refills: 0 | Status: SHIPPED | OUTPATIENT
Start: 2022-11-11

## 2022-11-11 NOTE — TELEPHONE ENCOUNTER
Please contact patient  I refilled his blood pressure medications for 90 day supply no refills  I have not seen him in the office since August of 2021    He should schedule annual physical     Thank you

## 2022-11-14 ENCOUNTER — TELEPHONE (OUTPATIENT)
Dept: FAMILY MEDICINE CLINIC | Facility: CLINIC | Age: 57
End: 2022-11-14

## 2022-11-14 DIAGNOSIS — D69.6 THROMBOCYTOPENIA (HCC): Primary | ICD-10-CM

## 2022-11-14 DIAGNOSIS — M79.18 MYOFASCIAL PAIN SYNDROME: ICD-10-CM

## 2022-11-14 DIAGNOSIS — I10 ESSENTIAL HYPERTENSION: ICD-10-CM

## 2022-11-14 NOTE — TELEPHONE ENCOUNTER
Patient called to schedule his physical and was wondering if he could have labwork ordered prior to his visit to check his vitamin levels and testosterone  Please advise and I will follow up with him   Thank you

## 2022-11-29 ENCOUNTER — TELEPHONE (OUTPATIENT)
Dept: FAMILY MEDICINE CLINIC | Facility: CLINIC | Age: 57
End: 2022-11-29

## 2022-11-29 DIAGNOSIS — E55.9 VITAMIN D DEFICIENCY: ICD-10-CM

## 2022-11-29 DIAGNOSIS — N28.9 RENAL INSUFFICIENCY: Primary | ICD-10-CM

## 2022-11-29 NOTE — TELEPHONE ENCOUNTER
Please contact patient regarding results of recent blood work  · Vitamin-D level is too high  If he takes any vitamin-D contain supplements-please strongly advise him to stop ALL  · Platelet count is stably decreased  · Creatinine is elevated above previous baseline  · Please ask patient to repeat nonfasting BMP and vitamin-D level prior to our appointment in mid December  Orders placed      Thank you

## 2022-12-02 LAB
25(OH)D3 SERPL-MCNC: 115 NG/ML (ref 30–100)
ALBUMIN SERPL-MCNC: 4.4 G/DL (ref 3.6–5.1)
ALBUMIN/GLOB SERPL: 1.6 (CALC) (ref 1–2.5)
ALP SERPL-CCNC: 48 U/L (ref 35–144)
ALT SERPL-CCNC: 27 U/L (ref 9–46)
AST SERPL-CCNC: 26 U/L (ref 10–35)
BASOPHILS # BLD AUTO: 28 CELLS/UL (ref 0–200)
BASOPHILS NFR BLD AUTO: 0.6 %
BILIRUB SERPL-MCNC: 0.8 MG/DL (ref 0.2–1.2)
BUN SERPL-MCNC: 25 MG/DL (ref 7–25)
BUN/CREAT SERPL: 16 (CALC) (ref 6–22)
CALCIUM SERPL-MCNC: 9.4 MG/DL (ref 8.6–10.3)
CHLORIDE SERPL-SCNC: 103 MMOL/L (ref 98–110)
CHOLEST SERPL-MCNC: 159 MG/DL
CHOLEST/HDLC SERPL: 2.7 (CALC)
CO2 SERPL-SCNC: 31 MMOL/L (ref 20–32)
CREAT SERPL-MCNC: 1.58 MG/DL (ref 0.7–1.3)
EOSINOPHIL # BLD AUTO: 169 CELLS/UL (ref 15–500)
EOSINOPHIL NFR BLD AUTO: 3.6 %
ERYTHROCYTE [DISTWIDTH] IN BLOOD BY AUTOMATED COUNT: 11.7 % (ref 11–15)
GFR/BSA.PRED SERPLBLD CYS-BASED-ARV: 51 ML/MIN/1.73M2
GLOBULIN SER CALC-MCNC: 2.7 G/DL (CALC) (ref 1.9–3.7)
GLUCOSE SERPL-MCNC: 98 MG/DL (ref 65–99)
HCT VFR BLD AUTO: 44.3 % (ref 38.5–50)
HDLC SERPL-MCNC: 60 MG/DL
HGB BLD-MCNC: 15.2 G/DL (ref 13.2–17.1)
LDLC SERPL CALC-MCNC: 88 MG/DL (CALC)
LYMPHOCYTES # BLD AUTO: 1452 CELLS/UL (ref 850–3900)
LYMPHOCYTES NFR BLD AUTO: 30.9 %
MCH RBC QN AUTO: 30.8 PG (ref 27–33)
MCHC RBC AUTO-ENTMCNC: 34.3 G/DL (ref 32–36)
MCV RBC AUTO: 89.9 FL (ref 80–100)
MONOCYTES # BLD AUTO: 677 CELLS/UL (ref 200–950)
MONOCYTES NFR BLD AUTO: 14.4 %
NEUTROPHILS # BLD AUTO: 2374 CELLS/UL (ref 1500–7800)
NEUTROPHILS NFR BLD AUTO: 50.5 %
NONHDLC SERPL-MCNC: 99 MG/DL (CALC)
PLATELET # BLD AUTO: 102 THOUSAND/UL (ref 140–400)
PMV BLD REES-ECKER: 12 FL (ref 7.5–12.5)
POTASSIUM SERPL-SCNC: 4.2 MMOL/L (ref 3.5–5.3)
PROT SERPL-MCNC: 7.1 G/DL (ref 6.1–8.1)
RBC # BLD AUTO: 4.93 MILLION/UL (ref 4.2–5.8)
SERVICE CMNT-IMP: ABNORMAL
SL AMB PLATELET ESTIMATION: ABNORMAL
SODIUM SERPL-SCNC: 139 MMOL/L (ref 135–146)
TESTOST FREE SERPL-MCNC: 95.5 PG/ML (ref 35–155)
TESTOST SERPL-MCNC: 516 NG/DL (ref 250–1100)
TRIGL SERPL-MCNC: 37 MG/DL
TSH SERPL-ACNC: 2.91 MIU/L (ref 0.4–4.5)
WBC # BLD AUTO: 4.7 THOUSAND/UL (ref 3.8–10.8)

## 2022-12-12 LAB
25(OH)D3 SERPL-MCNC: 103 NG/ML (ref 30–100)
BUN SERPL-MCNC: 26 MG/DL (ref 7–25)
BUN/CREAT SERPL: 18 (CALC) (ref 6–22)
CALCIUM SERPL-MCNC: 9.5 MG/DL (ref 8.6–10.3)
CHLORIDE SERPL-SCNC: 105 MMOL/L (ref 98–110)
CO2 SERPL-SCNC: 31 MMOL/L (ref 20–32)
CREAT SERPL-MCNC: 1.45 MG/DL (ref 0.7–1.3)
GFR/BSA.PRED SERPLBLD CYS-BASED-ARV: 56 ML/MIN/1.73M2
GLUCOSE SERPL-MCNC: 101 MG/DL (ref 65–99)
POTASSIUM SERPL-SCNC: 4.4 MMOL/L (ref 3.5–5.3)
SODIUM SERPL-SCNC: 141 MMOL/L (ref 135–146)

## 2022-12-14 ENCOUNTER — OFFICE VISIT (OUTPATIENT)
Dept: FAMILY MEDICINE CLINIC | Facility: CLINIC | Age: 57
End: 2022-12-14

## 2022-12-14 VITALS
DIASTOLIC BLOOD PRESSURE: 82 MMHG | TEMPERATURE: 98 F | OXYGEN SATURATION: 100 % | HEIGHT: 67 IN | RESPIRATION RATE: 16 BRPM | HEART RATE: 59 BPM | SYSTOLIC BLOOD PRESSURE: 122 MMHG | BODY MASS INDEX: 31.27 KG/M2 | WEIGHT: 199.2 LBS

## 2022-12-14 DIAGNOSIS — Z00.00 ENCOUNTER FOR WELLNESS EXAMINATION IN ADULT: Primary | ICD-10-CM

## 2022-12-14 DIAGNOSIS — I10 ESSENTIAL HYPERTENSION: ICD-10-CM

## 2022-12-14 DIAGNOSIS — R97.20 ELEVATED PROSTATE SPECIFIC ANTIGEN (PSA): ICD-10-CM

## 2022-12-14 DIAGNOSIS — R79.89 ELEVATED SERUM CREATININE: Chronic | ICD-10-CM

## 2022-12-14 DIAGNOSIS — D69.6 THROMBOCYTOPENIA (HCC): ICD-10-CM

## 2022-12-14 RX ORDER — AMLODIPINE BESYLATE 5 MG/1
5 TABLET ORAL DAILY
Qty: 90 TABLET | Refills: 1 | Status: SHIPPED | OUTPATIENT
Start: 2022-12-14

## 2022-12-20 PROBLEM — M25.511 ACUTE PAIN OF RIGHT SHOULDER: Status: RESOLVED | Noted: 2019-07-30 | Resolved: 2022-12-20

## 2022-12-20 PROBLEM — N28.9 RENAL INSUFFICIENCY: Status: RESOLVED | Noted: 2018-08-01 | Resolved: 2022-12-20

## 2022-12-20 PROBLEM — M54.6 ACUTE RIGHT-SIDED THORACIC BACK PAIN: Status: RESOLVED | Noted: 2020-08-13 | Resolved: 2022-12-20

## 2022-12-20 NOTE — ASSESSMENT & PLAN NOTE
Lower blood pressures at home  Treatment and symptoms of orthostatic lightheadedness  Patient will reduce dose of amlodipine from 10 to 5 mg daily  Continue current dose of valsartan 160 mg once a day  He will continue monitoring blood pressures at home and will contact me if blood pressures are not well controlled

## 2022-12-20 NOTE — ASSESSMENT & PLAN NOTE
Patient is under care of nephrology at [facility]  No evidence of renal insufficiency    Cystatin C is normal

## 2022-12-20 NOTE — ASSESSMENT & PLAN NOTE
Stable mild thrombocytopenia  Patient was evaluated by hematology at [facility]  Observation advised    Platelet count has been fluctuating from 96,000-111,000, most recently 102K

## 2023-01-20 DIAGNOSIS — N13.8 BPH WITH OBSTRUCTION/LOWER URINARY TRACT SYMPTOMS: ICD-10-CM

## 2023-01-20 DIAGNOSIS — R97.20 ELEVATED PROSTATE SPECIFIC ANTIGEN (PSA): ICD-10-CM

## 2023-01-20 DIAGNOSIS — N40.1 BPH WITH OBSTRUCTION/LOWER URINARY TRACT SYMPTOMS: ICD-10-CM

## 2023-01-21 RX ORDER — TADALAFIL 5 MG/1
TABLET ORAL
Qty: 90 TABLET | Refills: 3 | Status: SHIPPED | OUTPATIENT
Start: 2023-01-21

## 2023-02-12 DIAGNOSIS — I10 ESSENTIAL HYPERTENSION: ICD-10-CM

## 2023-02-12 RX ORDER — VALSARTAN 160 MG/1
TABLET ORAL
Qty: 90 TABLET | Refills: 3 | Status: SHIPPED | OUTPATIENT
Start: 2023-02-12

## 2023-03-13 DIAGNOSIS — I10 ESSENTIAL HYPERTENSION: ICD-10-CM

## 2023-03-13 RX ORDER — AMLODIPINE BESYLATE 10 MG/1
10 TABLET ORAL DAILY
Qty: 90 TABLET | Refills: 3 | Status: SHIPPED | OUTPATIENT
Start: 2023-03-13

## 2023-04-03 ENCOUNTER — OFFICE VISIT (OUTPATIENT)
Dept: FAMILY MEDICINE CLINIC | Facility: CLINIC | Age: 58
End: 2023-04-03

## 2023-04-03 VITALS
DIASTOLIC BLOOD PRESSURE: 70 MMHG | TEMPERATURE: 98.2 F | SYSTOLIC BLOOD PRESSURE: 110 MMHG | HEART RATE: 70 BPM | BODY MASS INDEX: 31.71 KG/M2 | OXYGEN SATURATION: 97 % | WEIGHT: 202 LBS | RESPIRATION RATE: 16 BRPM | HEIGHT: 67 IN

## 2023-04-03 DIAGNOSIS — J32.9 SINUSITIS, UNSPECIFIED CHRONICITY, UNSPECIFIED LOCATION: Primary | ICD-10-CM

## 2023-04-03 RX ORDER — CEFUROXIME AXETIL 500 MG/1
500 TABLET ORAL EVERY 12 HOURS SCHEDULED
Qty: 14 TABLET | Refills: 0 | Status: SHIPPED | OUTPATIENT
Start: 2023-04-03 | End: 2023-04-10

## 2023-04-03 NOTE — PROGRESS NOTES
"   FAMILY PRACTICE OFFICE VISIT       NAME: Ruthann Chappell  AGE: 62 y o  SEX: male       : 1965        MRN: [id number]    Assessment and Plan   1  Sinusitis, unspecified chronicity, unspecified location  -     cefuroxime (CEFTIN) 500 mg tablet; Take 1 tablet (500 mg total) by mouth every 12 (twelve) hours for 7 days     Augmentin causes GI upset  Therefore, will go with Ceftin 500 mg twice daily for 7 days  He will call for any persisting or worsening of symptoms  Chief Complaint     Chief Complaint   Patient presents with   • Cold Like Symptoms     Sinus, discharge, cough, sinus pressure 6 + days       History of Present Illness     Ruthann Chappell is a 62year old male presenting today for sinus symptoms  Started 5 days ago  Traveled to [place] for work  Cough  Sinus pain and pressure  Headaches, upper teeth pain  Green, bloody nasal drainage  Post nasal drip  No fevers  No chills  Using Dextromethorphan OTC as needed  Home COVID-19 test twice and has been negative  Review of Systems   Review of Systems   Constitutional: Positive for fatigue  Negative for chills, diaphoresis and fever  HENT: Positive for congestion, postnasal drip, sinus pressure and sinus pain  Negative for ear pain  Respiratory: Positive for cough  Neurological: Positive for headaches  I have reviewed the patient's medical history in detail; there are no changes to the history as noted in the electronic medical record  Objective     Vitals:    23 1413   BP: 110/70   Pulse: 70   Resp: 16   Temp: 98 2 °F (36 8 °C)   TempSrc: Temporal   SpO2: 97%   Weight: 91 6 kg (202 lb)   Height: 5' 7\" (1 702 m)     Wt Readings from Last 3 Encounters:   23 91 6 kg (202 lb)   22 90 4 kg (199 lb 3 2 oz)   10/31/22 88 5 kg (195 lb)     Physical Exam  Vitals and nursing note reviewed  Constitutional:       Appearance: Normal appearance  He is well-developed   He is not " ill-appearing  HENT:      Head: Normocephalic and atraumatic  Right Ear: Tympanic membrane and ear canal normal       Left Ear: Tympanic membrane and ear canal normal       Nose: Mucosal edema and congestion present  No rhinorrhea  Mouth/Throat:      Pharynx: No oropharyngeal exudate or posterior oropharyngeal erythema  Comments: Thick yellow post nasal drip  Eyes:      Conjunctiva/sclera: Conjunctivae normal    Cardiovascular:      Rate and Rhythm: Normal rate and regular rhythm  Heart sounds: Normal heart sounds  No murmur heard  Pulmonary:      Effort: Pulmonary effort is normal       Breath sounds: Normal breath sounds  Musculoskeletal:      Cervical back: Normal range of motion and neck supple  Lymphadenopathy:      Cervical: Cervical adenopathy present  Neurological:      Mental Status: He is alert  Psychiatric:         Mood and Affect: Mood normal             ALLERGIES:  Allergies   Allergen Reactions   • Moxifloxacin Itching and Edema     Category:  Allergy;    • Lisinopril Rash   • Valsartan Rash       Current Medications     Current Outpatient Medications   Medication Sig Dispense Refill   • amLODIPine (NORVASC) 10 mg tablet Take 1 tablet (10 mg total) by mouth daily 90 tablet 3   • Ascorbic Acid (EMILY-C PO) Take by mouth     • cefuroxime (CEFTIN) 500 mg tablet Take 1 tablet (500 mg total) by mouth every 12 (twelve) hours for 7 days 14 tablet 0   • Cholecalciferol (VITAMIN D3) 2000 units TABS Take 1 tablet by mouth daily       • Diovan 160 MG tablet TAKE ONE TABLET BY MOUTH EVERY DAY 90 tablet 3   • loratadine (CLARITIN) 10 mg tablet Take by mouth     • Misc Natural Products (TURMERIC CURCUMIN) CAPS Take 1 capsule by mouth 2 (two) times a day 2400mg twice daily      • Multiple Vitamins-Minerals (CENTRUM SILVER 50+MEN PO) Take by mouth     • tadalafil (CIALIS) 5 MG tablet TAKE ONE TABLET BY MOUTH EVERY DAY 90 tablet 3   • Zinc Sulfate (ZINC 15 PO) Take by mouth       No current facility-administered medications for this visit           Health Maintenance     Health Maintenance   Topic Date Due   • HIV Screening  Never done   • DTaP,Tdap,and Td Vaccines (1 - Tdap) Never done   • COVID-19 Vaccine (3 - Booster for Moderna series) 06/09/2021   • BMI: Followup Plan  05/06/2022   • Influenza Vaccine (1) 06/30/2023 (Originally 9/1/2022)   • Depression Screening  12/14/2023   • Annual Physical  12/14/2023   • BMI: Adult  04/03/2024   • Colorectal Cancer Screening  06/29/2025   • Hepatitis C Screening  Completed   • Pneumococcal Vaccine: Pediatrics (0 to 5 Years) and At-Risk Patients (6 to 59 Years)  Aged Out   • HIB Vaccine  Aged Out   • IPV Vaccine  Aged Out   • Hepatitis A Vaccine  Aged Out   • Meningococcal ACWY Vaccine  Aged Out   • HPV Vaccine  Aged [employer] History   Administered Date(s) Administered   • COVID-19 MODERNA VACC 0 5 ML IM 03/17/2021, 04/14/2021   • Influenza Quadrivalent Preservative Free 3 years and older IM 10/20/2017   • Influenza, seasonal, injectable 08/20/2016       KAEL Devlin

## 2023-04-07 DIAGNOSIS — J32.9 SINUSITIS, UNSPECIFIED CHRONICITY, UNSPECIFIED LOCATION: Primary | ICD-10-CM

## 2023-04-07 RX ORDER — GUAIFENESIN AND CODEINE PHOSPHATE 100; 10 MG/5ML; MG/5ML
5 SOLUTION ORAL EVERY 6 HOURS PRN
Qty: 180 ML | Refills: 0 | Status: SHIPPED | OUTPATIENT
Start: 2023-04-07

## 2023-05-03 ENCOUNTER — OFFICE VISIT (OUTPATIENT)
Dept: PODIATRY | Facility: CLINIC | Age: 58
End: 2023-05-03

## 2023-05-03 VITALS
SYSTOLIC BLOOD PRESSURE: 120 MMHG | DIASTOLIC BLOOD PRESSURE: 77 MMHG | WEIGHT: 200.6 LBS | HEART RATE: 76 BPM | HEIGHT: 67 IN | BODY MASS INDEX: 31.48 KG/M2

## 2023-05-03 DIAGNOSIS — Q66.90 CONGENITAL FOOT DEFORMITY: ICD-10-CM

## 2023-05-03 DIAGNOSIS — Q66.50 CONGENITAL PES PLANUS, UNSPECIFIED LATERALITY: Primary | ICD-10-CM

## 2023-05-03 NOTE — PROGRESS NOTES
"Assessment/Plan:       Diagnoses and all orders for this visit:    Congenital pes planus, unspecified laterality    Congenital foot deformity      Patient has severe congenital rigid pes planus  His orthotics are 13years old but until recently got rid of his pain  They're worn out and should be replaced  Rx Given    Briefly discussed flatfoot reconstruction but at this point no need to consider any surgery  I did not XR for this reason  Subjective:      Patient ID: Samantha Russo is a 62 y o  male  PAtient has worn orthotics most of his life  His current pair is 13years old  HE was born with severe flat feet  The orthotics help him reduce pain  The following portions of the patient's history were reviewed and updated as appropriate: allergies, current medications, past family history, past medical history, past social history, past surgical history and problem list     Review of Systems    Constitutional: Negative  Respiratory: Negative for cough and shortness of breath  Gastrointestinal: Negative for diarrhea, nausea and vomiting  Musculoskeletal: foot deformity  Skin: Negative for rash or wound  Neurological: Negative for weakness, numbness and headaches  Objective:      /77   Pulse 76   Ht 5' 7\" (1 702 m) Comment: verbal  Wt 91 kg (200 lb 9 6 oz)   BMI 31 42 kg/m²          Physical Exam  Vitals reviewed  Constitutional:       Appearance: He is not ill-appearing or diaphoretic  Cardiovascular:      Rate and Rhythm: Normal rate  Pulses: Normal pulses  Dorsalis pedis pulses are 2+ on the right side and 2+ on the left side  Posterior tibial pulses are 2+ on the right side and 2+ on the left side  Pulmonary:      Effort: Pulmonary effort is normal  No respiratory distress  Musculoskeletal:      Right foot: Decreased range of motion  Deformity (rigid pes planus with prominenent talar head plantarlyB/L) present  No foot drop        Left foot: " Decreased range of motion  Deformity present  No foot drop  Feet:      Right foot:      Protective Sensation: 10 sites tested  10 sites sensed  Skin integrity: Skin integrity normal       Toenail Condition: Right toenails are normal       Left foot:      Protective Sensation: 10 sites tested  10 sites sensed  Skin integrity: Skin integrity normal       Toenail Condition: Left toenails are normal    Skin:     Findings: No erythema  Neurological:      Mental Status: He is alert and oriented to person, place, and time     Psychiatric:         Mood and Affect: Mood normal

## 2023-08-16 ENCOUNTER — OFFICE VISIT (OUTPATIENT)
Dept: PODIATRY | Facility: CLINIC | Age: 58
End: 2023-08-16
Payer: COMMERCIAL

## 2023-08-16 VITALS
HEIGHT: 67 IN | HEART RATE: 64 BPM | WEIGHT: 200 LBS | BODY MASS INDEX: 31.39 KG/M2 | SYSTOLIC BLOOD PRESSURE: 118 MMHG | DIASTOLIC BLOOD PRESSURE: 77 MMHG

## 2023-08-16 DIAGNOSIS — Q66.90 CONGENITAL FOOT DEFORMITY: ICD-10-CM

## 2023-08-16 DIAGNOSIS — Q66.50 CONGENITAL PES PLANUS, UNSPECIFIED LATERALITY: Primary | ICD-10-CM

## 2023-08-16 PROCEDURE — 99213 OFFICE O/P EST LOW 20 MIN: CPT | Performed by: PODIATRIST

## 2023-08-16 RX ORDER — FINERENONE 20 MG/1
1 TABLET, FILM COATED ORAL DAILY
COMMUNITY
Start: 2023-08-07

## 2023-08-16 NOTE — PROGRESS NOTES
Assessment/Plan:      Diagnoses and all orders for this visit:    Congenital pes planus, unspecified laterality    Congenital foot deformity    Other orders  -     Kerendia 20 MG TABS; Take 1 tablet by mouth daily      Patients feet stable. He never got the orthotics, I reprinted his prescription. The midfoot deformity is quite significant. If offloaded properly the callus buildup would be reduced. He is functioning well with his foot deformity and at the moment is not interested in any reconstruction which I would agree with. Subjective:     Patient ID: Ebony Dickey is a 62 y.o. male. Patient has pain in callus from his foot deformity. He is getting orthotics but hasn't picked them up yet. He lost the script. The feet are rigid rocker bottoms. He gets callus under the midfoot. They are not bleeding      Review of Systems    Constitutional: Negative. Respiratory: Negative for cough and shortness of breath. Gastrointestinal: Negative for diarrhea, nausea and vomiting. Musculoskeletal: foot deformity  Skin: callus  Neurological: Negative for weakness, numbness and headaches. Objective:     Physical Exam  Vitals reviewed. Constitutional:       Appearance: He is not ill-appearing or diaphoretic. Cardiovascular:      Rate and Rhythm: Normal rate. Pulses: Normal pulses. Pulmonary:      Effort: Pulmonary effort is normal. No respiratory distress. Musculoskeletal:         General: Deformity (Bilateral pes planus with prominent midfoot rocker-bottom. Small hyperkeratotic lesion without hemorrhage. Limited midfoot range of motion.) present. Skin:     Findings: No erythema. Neurological:      Mental Status: He is alert and oriented to person, place, and time.    Psychiatric:         Mood and Affect: Mood normal.

## 2023-09-18 ENCOUNTER — EVALUATION (OUTPATIENT)
Dept: PHYSICAL THERAPY | Facility: OTHER | Age: 58
End: 2023-09-18
Payer: COMMERCIAL

## 2023-09-18 DIAGNOSIS — M21.42 PES PLANUS OF BOTH FEET: Primary | ICD-10-CM

## 2023-09-18 DIAGNOSIS — M21.41 PES PLANUS OF BOTH FEET: Primary | ICD-10-CM

## 2023-09-18 DIAGNOSIS — Q66.50 CONGENITAL PES PLANUS, UNSPECIFIED LATERALITY: ICD-10-CM

## 2023-09-18 DIAGNOSIS — Q66.90 CONGENITAL FOOT DEFORMITY: ICD-10-CM

## 2023-09-18 PROCEDURE — 97760 ORTHOTIC MGMT&TRAING 1ST ENC: CPT | Performed by: PHYSICAL THERAPIST

## 2023-09-18 NOTE — PROGRESS NOTES
Orthotic Evaluation    Today's date: 23  Patient name: Nallely Pruett  : 1965  MRN: [id number]  Referring provider: GREGORIO Weir  Dx:   Encounter Diagnosis     ICD-10-CM    1. Pes planus of both feet  M21.41     M21.42       2. Congenital pes planus, unspecified laterality  Q66.50 Ambulatory referral to Physical Therapy      3. Congenital foot deformity  Q66.90 Ambulatory referral to Physical Therapy                      Subjective:    Emily Steele presents today for orthotic evaluation. he complains of pain with functional activities including ambulation. The patient plans to use his orthotic for walking, standing and work. The orthotic will be placed in a wide sneaker and men's dress shoe reports wearing a double E primarily to fit his orthotic in. Patient is active- deadliftng, squatting 6 days a week. Works from home and is able to wear a sneaker. Patient reports he wears a stanley beast and dyad sneaker. [employer] men's dress shoe, tie shoe. Callus over navicular b/l which results in soreness. Objective:    Age: 62 y.o. Weight: 194    Foot Posture Index Score    Right Foot  Talar dome - +2  Malleolar curve - +2   Calcaneal eversion - +1  Talonavicular bulge - +2  Medial longitudinal arch - +2  Too many toes - +2  Total Score R = 11    Left Foot  Talar dome - +2  Malleolar curve - +1   Calcaneal eversion - +1  Talonavicular bulge - +2  Medial longitudinal arch - +2  Too many toes - +2  Total Score L = 10      Reference Values  Normal =    0 to +5  Pronated =  +6 to +9 Highly Pronated =  10+  Supinated =   -1 to -4 Highly Supinated = -5 to -12    Objective     General Comments: Ankle/Foot Comments   Gait- early excessive pronation, never recovers from excessive pronation    Callus in midfoot due to excessive loading. Assessment:    Patient requires custom foot orthosis with deepened heel cup, medial clip, and varus forefoot posting to correct altered gait mechanics.  We also discussed adding a navicular cutout to offload his painful callus. Patient is not currently controlled by his motion-controlled shoe and prior custom orthotics. His prior pair are rigid and do not have a medial clip for midfoot control or cutouts to offload his callusing. Orthotic goals:  1) Patient will have custom foot orthoses fitted to his shoe. 2) Patient will be compliant with break-in period of custom foot orthoses as prescribed by PT. 3) Patient will be compliant with custom foot orthoses use as prescribed by PT.      Plan:    Planned therapy interventions: orthotic fitting/training  Duration in visits: 2

## 2023-10-09 ENCOUNTER — OFFICE VISIT (OUTPATIENT)
Dept: PHYSICAL THERAPY | Facility: OTHER | Age: 58
End: 2023-10-09
Payer: COMMERCIAL

## 2023-10-09 DIAGNOSIS — Q66.50 CONGENITAL PES PLANUS, UNSPECIFIED LATERALITY: Primary | ICD-10-CM

## 2023-10-09 DIAGNOSIS — M21.42 PES PLANUS OF BOTH FEET: ICD-10-CM

## 2023-10-09 DIAGNOSIS — Q66.90 CONGENITAL FOOT DEFORMITY: ICD-10-CM

## 2023-10-09 DIAGNOSIS — M21.41 PES PLANUS OF BOTH FEET: ICD-10-CM

## 2023-10-09 PROCEDURE — L3010 FOOT LONGITUDINAL ARCH SUPPO: HCPCS | Performed by: PHYSICAL THERAPIST

## 2023-10-09 NOTE — PROGRESS NOTES
Custom orthotics fitted to patients shoe. However, orthotic, especially rearfoot was too narrow. Discussed modifications for dress shoe/low profile pair. Medial clip and navicular cutout looked good.

## 2023-10-09 NOTE — PROGRESS NOTES
Custom orthotics fitted to patients shoe. However, orthotic, especially rearfoot was too narrow. Discussed modifications for sneaker pair. Patient felt as though he had excessive load on lateral column and his balance was off.

## 2023-10-12 ENCOUNTER — TELEPHONE (OUTPATIENT)
Age: 58
End: 2023-10-12

## 2023-10-25 ENCOUNTER — OFFICE VISIT (OUTPATIENT)
Dept: FAMILY MEDICINE CLINIC | Facility: CLINIC | Age: 58
End: 2023-10-25
Payer: COMMERCIAL

## 2023-10-25 VITALS
OXYGEN SATURATION: 97 % | HEIGHT: 67 IN | RESPIRATION RATE: 16 BRPM | DIASTOLIC BLOOD PRESSURE: 80 MMHG | TEMPERATURE: 98.4 F | HEART RATE: 67 BPM | BODY MASS INDEX: 31.23 KG/M2 | WEIGHT: 199 LBS | SYSTOLIC BLOOD PRESSURE: 130 MMHG

## 2023-10-25 DIAGNOSIS — J32.9 SINUSITIS, UNSPECIFIED CHRONICITY, UNSPECIFIED LOCATION: Primary | ICD-10-CM

## 2023-10-25 PROCEDURE — 99213 OFFICE O/P EST LOW 20 MIN: CPT | Performed by: NURSE PRACTITIONER

## 2023-10-25 RX ORDER — CEFUROXIME AXETIL 500 MG/1
500 TABLET ORAL EVERY 12 HOURS SCHEDULED
Qty: 14 TABLET | Refills: 0 | Status: SHIPPED | OUTPATIENT
Start: 2023-10-25 | End: 2023-11-01

## 2023-10-25 RX ORDER — GUAIFENESIN AND CODEINE PHOSPHATE 100; 10 MG/5ML; MG/5ML
5 SOLUTION ORAL 3 TIMES DAILY PRN
Qty: 180 ML | Refills: 0 | Status: SHIPPED | OUTPATIENT
Start: 2023-10-25

## 2023-10-25 NOTE — PROGRESS NOTES
FAMILY PRACTICE OFFICE VISIT       NAME: Hanny Garcia  AGE: 62 y.o. SEX: male       : 1965        MRN: [id number]    Assessment and Plan   1. Sinusitis, unspecified chronicity, unspecified location  -     cefuroxime (CEFTIN) 500 mg tablet; Take 1 tablet (500 mg total) by mouth every 12 (twelve) hours for 7 days  -     guaifenesin-codeine (GUAIFENESIN AC) 100-10 MG/5ML liquid; Take 5 mL by mouth 3 (three) times a day as needed for cough       Call for persisting or worsening symptoms. Chief Complaint     Chief Complaint   Patient presents with    Cold Like Symptoms     Possible sinus infection. Teeth pain, sinus pain, traveling down his chest. X 3 days. History of Present Illness     Hanny Garcia is a 62year old male presenting today for sinus symptoms. Symptoms started 4 days ago. Nasal congestion, cough, sinus pressure. Green/yellow nasal drainage. Teeth pain now, facial pain. No fevers. Starting to get chills. Completed 2 home COVID-19 tests, Monday and today, they were both negative. Starting to move toward chest. Night time chest congestion and tight. Review of Systems   Review of Systems   Constitutional:  Positive for chills and fatigue. Negative for diaphoresis and fever. HENT:  Positive for congestion, postnasal drip, sinus pressure and sinus pain. Negative for ear pain and sore throat. Respiratory:  Positive for cough and chest tightness. Negative for shortness of breath and wheezing. Cardiovascular:  Negative for chest pain. Musculoskeletal:  Negative for myalgias. Neurological:  Positive for headaches. I have reviewed the patient's medical history in detail; there are no changes to the history as noted in the electronic medical record.     Objective     Vitals:    10/25/23 1117 10/25/23 1130   BP: 140/88 130/80   BP Location: Left arm    Patient Position: Sitting    Cuff Size: Standard    Pulse: 67    Resp: 16    Temp: 98.4 °F (36.9 °C)    TempSrc: Temporal    SpO2: 97%    Weight: 90.3 kg (199 lb)    Height: 5' 7" (1.702 m)      Wt Readings from Last 3 Encounters:   10/25/23 90.3 kg (199 lb)   08/16/23 90.7 kg (200 lb)   05/03/23 91 kg (200 lb 9.6 oz)     Physical Exam  Vitals and nursing note reviewed. Constitutional:       General: He is not in acute distress. Appearance: Normal appearance. He is not ill-appearing. HENT:      Head: Atraumatic. Right Ear: Tympanic membrane normal.      Left Ear: Tympanic membrane normal.      Nose: Congestion (right side) present. Mouth/Throat:      Mouth: Mucous membranes are moist.      Pharynx: Oropharynx is clear. Cardiovascular:      Rate and Rhythm: Normal rate and regular rhythm. Heart sounds: No murmur heard. Pulmonary:      Effort: Pulmonary effort is normal.      Breath sounds: Normal breath sounds. Musculoskeletal:      Cervical back: Normal range of motion and neck supple. Lymphadenopathy:      Cervical: No cervical adenopathy. Neurological:      Mental Status: He is alert. Psychiatric:         Mood and Affect: Mood normal.         Depression Screening and Follow-up Plan: Patient was screened for depression during today's encounter. They screened negative with a PHQ-2 score of 0. ALLERGIES:  Allergies   Allergen Reactions    Moxifloxacin Itching and Edema     Category: Allergy;      Lisinopril Rash    Valsartan Rash       Current Medications     Current Outpatient Medications   Medication Sig Dispense Refill    amLODIPine (NORVASC) 10 mg tablet Take 1 tablet (10 mg total) by mouth daily 90 tablet 3    Ascorbic Acid (EMILY-C PO) Take by mouth      cefuroxime (CEFTIN) 500 mg tablet Take 1 tablet (500 mg total) by mouth every 12 (twelve) hours for 7 days 14 tablet 0    Cholecalciferol (VITAMIN D3) 2000 units TABS Take 1 tablet by mouth daily        Diovan 160 MG tablet TAKE ONE TABLET BY MOUTH EVERY DAY 90 tablet 3    guaifenesin-codeine (GUAIFENESIN AC) 100-10 MG/5ML liquid Take 5 mL by mouth 3 (three) times a day as needed for cough 180 mL 0    Kerendia 20 MG TABS Take 1 tablet by mouth daily      Misc Natural Products (TURMERIC CURCUMIN) CAPS Take 1 capsule by mouth 2 (two) times a day 2400mg twice daily       Multiple Vitamins-Minerals (CENTRUM SILVER 50+MEN PO) Take by mouth      tadalafil (CIALIS) 5 MG tablet TAKE ONE TABLET BY MOUTH EVERY DAY 90 tablet 3    Zinc Sulfate (ZINC 15 PO) Take by mouth       No current facility-administered medications for this visit.          Health Maintenance     Health Maintenance   Topic Date Due    HIV Screening  Never done    DTaP,Tdap,and Td Vaccines (1 - Tdap) Never done    PT PLAN OF CARE  08/01/2019    COVID-19 Vaccine (3 - Moderna series) 06/09/2021    BMI: Followup Plan  05/06/2022    Influenza Vaccine (1) 09/01/2023    Annual Physical  12/14/2023    Depression Screening  10/25/2024    BMI: Adult  10/25/2024    Colorectal Cancer Screening  06/29/2025    Hepatitis C Screening  Completed    Pneumococcal Vaccine: Pediatrics (0 to 5 Years) and At-Risk Patients (6 to 59 Years)  Aged Out    HIB Vaccine  Aged Out    IPV Vaccine  Aged Out    Hepatitis A Vaccine  Aged Out    Meningococcal ACWY Vaccine  Aged Out    HPV Vaccine  Aged [employer] History   Administered Date(s) Administered    COVID-19 MODERNA VACC 0.5 ML IM 03/17/2021, 04/14/2021    INFLUENZA 08/20/2016, 10/20/2017    Influenza Quadrivalent Preservative Free 3 years and older IM 10/20/2017    Influenza, seasonal, injectable 08/20/2016       KAEL Aranda

## 2023-10-30 ENCOUNTER — OFFICE VISIT (OUTPATIENT)
Dept: PHYSICAL THERAPY | Facility: OTHER | Age: 58
End: 2023-10-30
Payer: COMMERCIAL

## 2023-10-30 DIAGNOSIS — Q66.90 CONGENITAL FOOT DEFORMITY: ICD-10-CM

## 2023-10-30 DIAGNOSIS — Q66.50 CONGENITAL PES PLANUS, UNSPECIFIED LATERALITY: Primary | ICD-10-CM

## 2023-10-30 DIAGNOSIS — M21.42 PES PLANUS OF BOTH FEET: ICD-10-CM

## 2023-10-30 DIAGNOSIS — M21.41 PES PLANUS OF BOTH FEET: ICD-10-CM

## 2023-11-01 NOTE — PROGRESS NOTES
Modified orthotics fitted to patients dress shoe and sneakers. Improved fit. Patient initially noted less pain ad comfort with orthotics. He will trial new orthotics 1-2 weeks and follow up on status. Patient does report he is eager to order additional pairs of orthotics once he feels these are the best fit.

## 2023-12-12 ENCOUNTER — OFFICE VISIT (OUTPATIENT)
Dept: PHYSICAL THERAPY | Facility: OTHER | Age: 58
End: 2023-12-12

## 2023-12-12 DIAGNOSIS — Q66.51 CONGENITAL PES PLANUS OF BOTH FEET: Primary | ICD-10-CM

## 2023-12-12 DIAGNOSIS — Q66.52 CONGENITAL PES PLANUS OF BOTH FEET: Primary | ICD-10-CM

## 2023-12-12 NOTE — PROGRESS NOTES
Patient reports he gets ankle pain from time to time in the orthotics made for his sneakers. Pinpoints this primarily to the medial ankle and feels he needs more support. Suggested increasing both forefoot and rearfoot posting. Orthotics will be returned for modification. Patient does report eagerness to order an additional pair once his orthotics are finalized.
DIZZINESS/LOC/HEADACHE

## 2024-01-15 ENCOUNTER — OFFICE VISIT (OUTPATIENT)
Dept: PHYSICAL THERAPY | Facility: OTHER | Age: 59
End: 2024-01-15

## 2024-01-15 DIAGNOSIS — M21.41 PES PLANUS OF BOTH FEET: Primary | ICD-10-CM

## 2024-01-15 DIAGNOSIS — M21.42 PES PLANUS OF BOTH FEET: Primary | ICD-10-CM

## 2024-01-15 NOTE — PROGRESS NOTES
Modified orthotics dispensed. Patient initially reporting comfort. Reports that his pair of orthotics for his dress shoe are working well and he was able to utilize this pair more over the holiday season. He wll report back after he breaks this pair in and would like to order 2 additional pairs once he is comfortable with modifications. Discussed we could add a medial clip to his pair for lifting like the pair for his dress shoes.

## 2024-01-31 DIAGNOSIS — N13.8 BPH WITH OBSTRUCTION/LOWER URINARY TRACT SYMPTOMS: ICD-10-CM

## 2024-01-31 DIAGNOSIS — R97.20 ELEVATED PROSTATE SPECIFIC ANTIGEN (PSA): ICD-10-CM

## 2024-01-31 DIAGNOSIS — N40.1 BPH WITH OBSTRUCTION/LOWER URINARY TRACT SYMPTOMS: ICD-10-CM

## 2024-01-31 RX ORDER — TADALAFIL 5 MG/1
5 TABLET ORAL DAILY
Qty: 90 TABLET | Refills: 3 | Status: SHIPPED | OUTPATIENT
Start: 2024-01-31

## 2024-01-31 NOTE — TELEPHONE ENCOUNTER
Pt called and rescheduled appt. Pt has Covid.     Pt requested  refill on tadalafil for 90 days supply     Please review

## 2024-02-07 ENCOUNTER — TELEPHONE (OUTPATIENT)
Dept: FAMILY MEDICINE CLINIC | Facility: CLINIC | Age: 59
End: 2024-02-07

## 2024-02-07 DIAGNOSIS — I10 ESSENTIAL HYPERTENSION: ICD-10-CM

## 2024-02-08 ENCOUNTER — PATIENT MESSAGE (OUTPATIENT)
Dept: FAMILY MEDICINE CLINIC | Facility: CLINIC | Age: 59
End: 2024-02-08

## 2024-02-08 RX ORDER — VALSARTAN 160 MG/1
TABLET ORAL
Qty: 90 TABLET | Refills: 0 | OUTPATIENT
Start: 2024-02-08

## 2024-02-08 NOTE — TELEPHONE ENCOUNTER
Please contact [facility].  I declined the refill for Diovan.  Patient has been following up with kidney specialist at [facility], Dr. Holden.  Prescription for Diovan should be refilled by his kidney doctor since they are monitoring patient's kidney function and blood pressure.  Thank you

## 2024-02-09 ENCOUNTER — TELEPHONE (OUTPATIENT)
Dept: FAMILY MEDICINE CLINIC | Facility: CLINIC | Age: 59
End: 2024-02-09

## 2024-02-09 DIAGNOSIS — R79.89 ELEVATED SERUM CREATININE: Chronic | ICD-10-CM

## 2024-02-09 DIAGNOSIS — E55.9 VITAMIN D DEFICIENCY: ICD-10-CM

## 2024-02-09 DIAGNOSIS — I10 ESSENTIAL HYPERTENSION: ICD-10-CM

## 2024-02-09 DIAGNOSIS — D69.6 THROMBOCYTOPENIA (HCC): Primary | ICD-10-CM

## 2024-02-09 DIAGNOSIS — M79.18 MYOFASCIAL PAIN SYNDROME: ICD-10-CM

## 2024-02-09 RX ORDER — VALSARTAN 160 MG/1
160 TABLET ORAL DAILY
Qty: 90 TABLET | Refills: 3 | Status: SHIPPED | OUTPATIENT
Start: 2024-02-09

## 2024-02-09 NOTE — TELEPHONE ENCOUNTER
I spoke with patient.    He started Kerendia as per nephrology.    Nephrology monitors blood work every 2 months, most recent had potassium of 4.5 with creatinine 1.54.    Nephrology recommends to continue Diovan 160 mg daily, prescription was refilled by nephrology and myself.    Patient will proceed with routine blood work in March and will schedule annual physical with myself

## 2024-02-25 LAB
25(OH)D3 SERPL-MCNC: 72 NG/ML (ref 30–100)
ALBUMIN SERPL-MCNC: 4.3 G/DL (ref 3.6–5.1)
ALBUMIN/GLOB SERPL: 1.7 (CALC) (ref 1–2.5)
ALP SERPL-CCNC: 43 U/L (ref 35–144)
ALT SERPL-CCNC: 19 U/L (ref 9–46)
AST SERPL-CCNC: 19 U/L (ref 10–35)
BASOPHILS # BLD AUTO: 28 CELLS/UL (ref 0–200)
BASOPHILS NFR BLD AUTO: 0.6 %
BILIRUB SERPL-MCNC: 1 MG/DL (ref 0.2–1.2)
BUN SERPL-MCNC: 19 MG/DL (ref 7–25)
BUN/CREAT SERPL: 13 (CALC) (ref 6–22)
CALCIUM SERPL-MCNC: 8.9 MG/DL (ref 8.6–10.3)
CHLORIDE SERPL-SCNC: 105 MMOL/L (ref 98–110)
CHOLEST SERPL-MCNC: 141 MG/DL
CHOLEST/HDLC SERPL: 2.7 (CALC)
CO2 SERPL-SCNC: 26 MMOL/L (ref 20–32)
CREAT SERPL-MCNC: 1.5 MG/DL (ref 0.7–1.3)
EOSINOPHIL # BLD AUTO: 141 CELLS/UL (ref 15–500)
EOSINOPHIL NFR BLD AUTO: 3 %
ERYTHROCYTE [DISTWIDTH] IN BLOOD BY AUTOMATED COUNT: 12.2 % (ref 11–15)
GFR/BSA.PRED SERPLBLD CYS-BASED-ARV: 54 ML/MIN/1.73M2
GLOBULIN SER CALC-MCNC: 2.6 G/DL (CALC) (ref 1.9–3.7)
GLUCOSE SERPL-MCNC: 89 MG/DL (ref 65–99)
HCT VFR BLD AUTO: 43.3 % (ref 38.5–50)
HDLC SERPL-MCNC: 53 MG/DL
HGB BLD-MCNC: 14.3 G/DL (ref 13.2–17.1)
LDLC SERPL CALC-MCNC: 74 MG/DL (CALC)
LYMPHOCYTES # BLD AUTO: 1415 CELLS/UL (ref 850–3900)
LYMPHOCYTES NFR BLD AUTO: 30.1 %
MAGNESIUM SERPL-MCNC: 2.3 MG/DL (ref 1.5–2.5)
MCH RBC QN AUTO: 29.8 PG (ref 27–33)
MCHC RBC AUTO-ENTMCNC: 33 G/DL (ref 32–36)
MCV RBC AUTO: 90.2 FL (ref 80–100)
MONOCYTES # BLD AUTO: 649 CELLS/UL (ref 200–950)
MONOCYTES NFR BLD AUTO: 13.8 %
NEUTROPHILS # BLD AUTO: 2468 CELLS/UL (ref 1500–7800)
NEUTROPHILS NFR BLD AUTO: 52.5 %
NONHDLC SERPL-MCNC: 88 MG/DL (CALC)
PLATELET # BLD AUTO: 101 THOUSAND/UL (ref 140–400)
PMV BLD REES-ECKER: 12.4 FL (ref 7.5–12.5)
POTASSIUM SERPL-SCNC: 4.1 MMOL/L (ref 3.5–5.3)
PROT SERPL-MCNC: 6.9 G/DL (ref 6.1–8.1)
RBC # BLD AUTO: 4.8 MILLION/UL (ref 4.2–5.8)
SODIUM SERPL-SCNC: 139 MMOL/L (ref 135–146)
TRIGL SERPL-MCNC: 48 MG/DL
TSH SERPL-ACNC: 2.63 MIU/L (ref 0.4–4.5)
WBC # BLD AUTO: 4.7 THOUSAND/UL (ref 3.8–10.8)

## 2024-03-04 LAB
25(OH)D3 SERPL-MCNC: 72 NG/ML (ref 30–100)
ALBUMIN SERPL-MCNC: 4.3 G/DL (ref 3.6–5.1)
ALBUMIN/GLOB SERPL: 1.7 (CALC) (ref 1–2.5)
ALP SERPL-CCNC: 43 U/L (ref 35–144)
ALT SERPL-CCNC: 19 U/L (ref 9–46)
AST SERPL-CCNC: 19 U/L (ref 10–35)
BASOPHILS # BLD AUTO: 28 CELLS/UL (ref 0–200)
BASOPHILS NFR BLD AUTO: 0.6 %
BILIRUB SERPL-MCNC: 1 MG/DL (ref 0.2–1.2)
BUN SERPL-MCNC: 19 MG/DL (ref 7–25)
BUN/CREAT SERPL: 13 (CALC) (ref 6–22)
CALCIUM SERPL-MCNC: 8.9 MG/DL (ref 8.6–10.3)
CHLORIDE SERPL-SCNC: 105 MMOL/L (ref 98–110)
CHOLEST SERPL-MCNC: 141 MG/DL
CHOLEST/HDLC SERPL: 2.7 (CALC)
CO2 SERPL-SCNC: 26 MMOL/L (ref 20–32)
CREAT SERPL-MCNC: 1.5 MG/DL (ref 0.7–1.3)
EOSINOPHIL # BLD AUTO: 141 CELLS/UL (ref 15–500)
EOSINOPHIL NFR BLD AUTO: 3 %
ERYTHROCYTE [DISTWIDTH] IN BLOOD BY AUTOMATED COUNT: 12.2 % (ref 11–15)
GFR/BSA.PRED SERPLBLD CYS-BASED-ARV: 54 ML/MIN/1.73M2
GLOBULIN SER CALC-MCNC: 2.6 G/DL (CALC) (ref 1.9–3.7)
GLUCOSE SERPL-MCNC: 89 MG/DL (ref 65–99)
HCT VFR BLD AUTO: 43.3 % (ref 38.5–50)
HDLC SERPL-MCNC: 53 MG/DL
HGB BLD-MCNC: 14.3 G/DL (ref 13.2–17.1)
LDLC SERPL CALC-MCNC: 74 MG/DL (CALC)
LYMPHOCYTES # BLD AUTO: 1415 CELLS/UL (ref 850–3900)
LYMPHOCYTES NFR BLD AUTO: 30.1 %
MAGNESIUM SERPL-MCNC: 2.3 MG/DL (ref 1.5–2.5)
MCH RBC QN AUTO: 29.8 PG (ref 27–33)
MCHC RBC AUTO-ENTMCNC: 33 G/DL (ref 32–36)
MCV RBC AUTO: 90.2 FL (ref 80–100)
MONOCYTES # BLD AUTO: 649 CELLS/UL (ref 200–950)
MONOCYTES NFR BLD AUTO: 13.8 %
NEUTROPHILS # BLD AUTO: 2468 CELLS/UL (ref 1500–7800)
NEUTROPHILS NFR BLD AUTO: 52.5 %
NONHDLC SERPL-MCNC: 88 MG/DL (CALC)
PLATELET # BLD AUTO: 101 THOUSAND/UL (ref 140–400)
PMV BLD REES-ECKER: 12.4 FL (ref 7.5–12.5)
POTASSIUM SERPL-SCNC: 4.1 MMOL/L (ref 3.5–5.3)
PROT SERPL-MCNC: 6.9 G/DL (ref 6.1–8.1)
RBC # BLD AUTO: 4.8 MILLION/UL (ref 4.2–5.8)
SODIUM SERPL-SCNC: 139 MMOL/L (ref 135–146)
TESTOST FREE SERPL-MCNC: 90.8 PG/ML (ref 35–155)
TESTOST SERPL-MCNC: 468 NG/DL (ref 250–1100)
TRIGL SERPL-MCNC: 48 MG/DL
TSH SERPL-ACNC: 2.63 MIU/L (ref 0.4–4.5)
WBC # BLD AUTO: 4.7 THOUSAND/UL (ref 3.8–10.8)

## 2024-03-05 ENCOUNTER — OFFICE VISIT (OUTPATIENT)
Dept: FAMILY MEDICINE CLINIC | Facility: CLINIC | Age: 59
End: 2024-03-05
Payer: COMMERCIAL

## 2024-03-05 VITALS
BODY MASS INDEX: 31.58 KG/M2 | RESPIRATION RATE: 18 BRPM | HEIGHT: 67 IN | OXYGEN SATURATION: 98 % | WEIGHT: 201.2 LBS | HEART RATE: 74 BPM | TEMPERATURE: 98.2 F | SYSTOLIC BLOOD PRESSURE: 120 MMHG | DIASTOLIC BLOOD PRESSURE: 74 MMHG

## 2024-03-05 DIAGNOSIS — Z82.49 FAMILY HISTORY OF HEART DISEASE: ICD-10-CM

## 2024-03-05 DIAGNOSIS — I10 ESSENTIAL HYPERTENSION: ICD-10-CM

## 2024-03-05 DIAGNOSIS — Z00.00 ENCOUNTER FOR WELLNESS EXAMINATION IN ADULT: Primary | ICD-10-CM

## 2024-03-05 DIAGNOSIS — R06.00 DYSPNEA, UNSPECIFIED TYPE: ICD-10-CM

## 2024-03-05 DIAGNOSIS — R79.89 ELEVATED SERUM CREATININE: Chronic | ICD-10-CM

## 2024-03-05 DIAGNOSIS — N13.8 BPH WITH URINARY OBSTRUCTION: ICD-10-CM

## 2024-03-05 DIAGNOSIS — N40.1 BPH WITH URINARY OBSTRUCTION: ICD-10-CM

## 2024-03-05 DIAGNOSIS — D69.6 THROMBOCYTOPENIA (HCC): ICD-10-CM

## 2024-03-05 DIAGNOSIS — R97.20 ELEVATED PROSTATE SPECIFIC ANTIGEN (PSA): ICD-10-CM

## 2024-03-05 PROCEDURE — 99396 PREV VISIT EST AGE 40-64: CPT | Performed by: FAMILY MEDICINE

## 2024-03-05 PROCEDURE — 99213 OFFICE O/P EST LOW 20 MIN: CPT | Performed by: FAMILY MEDICINE

## 2024-03-05 RX ORDER — BETAMETHASONE DIPROPIONATE 0.5 MG/G
CREAM TOPICAL
COMMUNITY
Start: 2024-01-09

## 2024-03-05 RX ORDER — SODIUM FLUORIDE1.1%, POTASSIUM NITRATE 5% 5.8; 57.5 MG/ML; MG/ML
GEL, DENTIFRICE DENTAL
COMMUNITY
Start: 2024-02-21

## 2024-03-05 NOTE — PROGRESS NOTES
Name: Clif Li      : 1965      MRN: [id number]  Encounter Provider: Ellie Gagnon MD  Encounter Date: 3/5/2024   Encounter department: [facility]    Assessment & Plan     1. Encounter for wellness examination in adult  Comments:  Up-to-date with colonoscopy with exercises regularly.  Healthy diet    2. Essential hypertension  Assessment & Plan:  Blood pressure is well-controlled on Kerendia, Diovan and amlodipine.  Patient follows with nephrology at [facility]    Orders:  -     Stress test only, exercise; Future; Expected date: 2024  -     Echo complete w/ contrast if indicated; Future; Expected date: 2024    3. Elevated serum creatinine  Assessment & Plan:  Patient is under care of nephrology at [facility].  No evidence of renal insufficiency.  Cystatin C is normal.  Recently started Kerendia  Renal monitor his blood work every 2 months.  Patient has reduced protein supplement intake.      4. Elevated prostate specific antigen (PSA)  Assessment & Plan:  Chronic elevation of PSA.  Follows with urology at [facility] and [facility]      5. Dyspnea, unspecified type  -     Stress test only, exercise; Future; Expected date: 2024  -     Echo complete w/ contrast if indicated; Future; Expected date: 2024    6. Family history of heart disease  Assessment & Plan:  Family history of CAD-father  Longstanding history of hypertension  BMI 31  Proceed with echo, stress test and CT coronary study    Orders:  -     CT coronary calcium score; Future; Expected date: 2024    7. Thrombocytopenia (HCC)  Assessment & Plan:  Stable platelet count.  Continue monitoring every few months.  Patient was previously evaluated by hematology    Orders:  -     CBC and Platelet; Standing    8. BPH with urinary obstruction  Assessment & Plan:  Follows with urology             Subjective     Annual well exam  Patient has been feeling generally well.     PSA 5.71- stable he follows with  allergy at [facility] and Dr. Maradiaga at [facility].   Cr 1.5   K 4.1  Patient is under care of of nephrology at [facility].  He has recently reduced protein supplement intake.     Platelet count is stable 101 K -stable  COVID-19 4 to 5 weeks ago, mild case, reports occasional upper respiratory residual wheezing, overall feels well, no chest tightness, shortness of breath.      No complaints of chest pain, palpitations or dizziness.    Family history: Father- CAD at 65  No exertional  s/o    Current Rx updated.  Patient is treated for hypertension, Diovan, amlodipine, new Rx: Kerendia        Review of Systems   Constitutional: Negative.    HENT: Negative.     Eyes: Negative.    Respiratory: Negative.     Cardiovascular: Negative.    Gastrointestinal: Negative.    Endocrine: Negative.    Genitourinary: Negative.    Musculoskeletal: Negative.    Skin: Negative.    Allergic/Immunologic: Negative.    Neurological: Negative.    Hematological: Negative.    Psychiatric/Behavioral: Negative.         Past Medical History:   Diagnosis Date   • Benign prostatic hyperplasia    • Herpes simplex     RESOLVED [id number]   • Hypertension    • Renal disorder      Past Surgical History:   Procedure Laterality Date   • COLONOSCOPY  12/23/2015    DR GUARDADO   • PROSTATE BIOPSY       Family History   Problem Relation Age of Onset   • Other Mother         CABG   • Diabetes Mother    • Hypertension Mother    • Hypertension Father    • Kidney cancer Family    • Prostate cancer Family    • Hypertension Brother    • Hypertension Sister    • Celiac disease Brother    • No Known Problems Son    • Eczema Daughter      Social History     Socioeconomic History   • Marital status: /Civil Union     Spouse name: Ivana    • Number of children: 2   • Years of education: None   • Highest education level: None   Occupational History   • Occupation: /[employer]   Tobacco Use   • Smoking status: Never   • Smokeless tobacco: Never   Vaping  Use   • Vaping status: Never Used   Substance and Sexual Activity   • Alcohol use: No   • Drug use: No   • Sexual activity: Yes   Other Topics Concern   • None   Social History Narrative   • None     Social Determinants of Health     Financial Resource Strain: Not on file   Food Insecurity: Not on file   Transportation Needs: Not on file   Physical Activity: Sufficiently Active (8/13/2020)    Exercise Vital Sign    • Days of Exercise per Week: 6 days    • Minutes of Exercise per Session: 80 min   Stress: Not on file   Social Connections: Not on file   Intimate Partner Violence: Not on file   Housing Stability: Not on file     Current Outpatient Medications on File Prior to Visit   Medication Sig   • amLODIPine (NORVASC) 10 mg tablet Take 1 tablet (10 mg total) by mouth daily   • Ascorbic Acid (EMILY-C PO) Take by mouth   • betamethasone dipropionate (DIPROSONE) 0.05 % cream APPLY ONCE TO TWICE A DAY FOR 1 TO 2 WEEKS TO THE ARMS AND LEGS AS NEEDED FOR FLARES   • Cholecalciferol (VITAMIN D3) 2000 units TABS Take 1 tablet by mouth daily     • Diovan 160 MG tablet Take 1 tablet (160 mg total) by mouth daily   • guaifenesin-codeine (GUAIFENESIN AC) 100-10 MG/5ML liquid Take 5 mL by mouth 3 (three) times a day as needed for cough   • Kerendia 20 MG TABS Take 1 tablet by mouth daily   • Misc Natural Products (TURMERIC CURCUMIN) CAPS Take 1 capsule by mouth 2 (two) times a day 2400mg twice daily    • Multiple Vitamins-Minerals (CENTRUM SILVER 50+MEN PO) Take by mouth   • Sodium Fluoride 5000 Sensitive 1.1-5 % GEL BRUSH DAILY AS DIRECTED   • tadalafil (CIALIS) 5 MG tablet Take 1 tablet (5 mg total) by mouth daily   • Zinc Sulfate (ZINC 15 PO) Take by mouth     Allergies   Allergen Reactions   • Moxifloxacin Itching and Edema     Category: Allergy;    • Lisinopril Rash   • Valsartan Rash     Immunization History   Administered Date(s) Administered   • COVID-19 MODERNA VACC 0.5 ML IM 03/17/2021, 04/14/2021, 10/29/2021   •  "INFLUENZA 08/20/2016, 10/20/2017   • Influenza Quadrivalent Preservative Free 3 years and older IM 10/20/2017   • Influenza, seasonal, injectable 08/20/2016       Objective     /74   Pulse 74   Temp 98.2 °F (36.8 °C) (Temporal)   Resp 18   Ht 5' 7\" (1.702 m)   Wt 91.3 kg (201 lb 3.2 oz)   SpO2 98%   BMI 31.51 kg/m²     Physical Exam  Vitals and nursing note reviewed.   Constitutional:       General: He is not in acute distress.     Appearance: Normal appearance. He is well-developed. He is not ill-appearing.   HENT:      Head: Normocephalic and atraumatic.   Eyes:      Conjunctiva/sclera: Conjunctivae normal.   Neck:      Vascular: No carotid bruit.   Cardiovascular:      Rate and Rhythm: Normal rate and regular rhythm.      Heart sounds: Normal heart sounds. No murmur heard.  Pulmonary:      Effort: Pulmonary effort is normal. No respiratory distress.      Breath sounds: Normal breath sounds. No wheezing or rales.   Abdominal:      General: Bowel sounds are normal. There is no distension or abdominal bruit.   Musculoskeletal:         General: Normal range of motion.      Cervical back: Neck supple.      Right lower leg: No edema.      Left lower leg: No edema.   Neurological:      General: No focal deficit present.      Mental Status: He is alert and oriented to person, place, and time.      Cranial Nerves: No cranial nerve deficit.   Psychiatric:         Mood and Affect: Mood normal.         Behavior: Behavior normal.         Thought Content: Thought content normal.       Ellie Gagnon MD    "

## 2024-03-07 PROBLEM — Z82.49 FAMILY HISTORY OF HEART DISEASE: Status: ACTIVE | Noted: 2024-03-07

## 2024-03-08 NOTE — ASSESSMENT & PLAN NOTE
Chronic elevation of PSA.  Follows with urology at [facility] and [facility]

## 2024-03-08 NOTE — ASSESSMENT & PLAN NOTE
Blood pressure is well-controlled on Kerendia, Diovan and amlodipine.  Patient follows with nephrology at [facility]

## 2024-03-08 NOTE — ASSESSMENT & PLAN NOTE
Stable platelet count.  Continue monitoring every few months.  Patient was previously evaluated by hematology

## 2024-03-08 NOTE — ASSESSMENT & PLAN NOTE
Patient is under care of nephrology at [facility].  No evidence of renal insufficiency.  Cystatin C is normal.  Recently started Kerendia  Renal monitor his blood work every 2 months.  Patient has reduced protein supplement intake.

## 2024-03-08 NOTE — ASSESSMENT & PLAN NOTE
Family history of CAD-father  Longstanding history of hypertension  BMI 31  Proceed with echo, stress test and CT coronary study

## 2024-04-01 ENCOUNTER — HOSPITAL ENCOUNTER (OUTPATIENT)
Dept: CT IMAGING | Facility: HOSPITAL | Age: 59
Discharge: HOME | End: 2024-04-01
Payer: COMMERCIAL

## 2024-04-01 DIAGNOSIS — Z82.49 FAMILY HISTORY OF HEART DISEASE: ICD-10-CM

## 2024-04-01 PROCEDURE — 75571 CT HRT W/O DYE W/CA TEST: CPT

## 2024-04-05 ENCOUNTER — OFFICE VISIT (OUTPATIENT)
Dept: PHYSICAL THERAPY | Facility: OTHER | Age: 59
End: 2024-04-05

## 2024-04-05 DIAGNOSIS — M21.41 PES PLANUS OF BOTH FEET: Primary | ICD-10-CM

## 2024-04-05 DIAGNOSIS — M21.42 PES PLANUS OF BOTH FEET: Primary | ICD-10-CM

## 2024-04-05 NOTE — PROGRESS NOTES
Patient reports that he continues to have some discomfort along his callus on his right foot in both the graphite and the semi-rigid pair of orthotics for his sneakers. We discussed adding a medial clip to the sneaker orthotics and backfilling the cutout with poron.

## 2024-04-11 DIAGNOSIS — R93.1 ELEVATED CORONARY ARTERY CALCIUM SCORE: Primary | ICD-10-CM

## 2024-04-22 ENCOUNTER — OFFICE VISIT (OUTPATIENT)
Dept: PHYSICAL THERAPY | Facility: OTHER | Age: 59
End: 2024-04-22

## 2024-04-22 DIAGNOSIS — M21.41 PES PLANUS OF BOTH FEET: Primary | ICD-10-CM

## 2024-04-22 DIAGNOSIS — M21.42 PES PLANUS OF BOTH FEET: Primary | ICD-10-CM

## 2024-04-24 NOTE — PROGRESS NOTES
Modified orthotics dispensed. Patient initially reporting comfort. Reports that his pair of orthotics for his dress shoe are working well. He wll report back after he breaks this pair in and would like to order 2 additional pairs once he is comfortable with modifications.

## 2024-05-01 DIAGNOSIS — I10 ESSENTIAL HYPERTENSION: ICD-10-CM

## 2024-05-01 RX ORDER — AMLODIPINE BESYLATE 10 MG/1
10 TABLET ORAL DAILY
Qty: 90 TABLET | Refills: 3 | Status: SHIPPED | OUTPATIENT
Start: 2024-05-01

## 2024-05-03 ENCOUNTER — OFFICE VISIT (OUTPATIENT)
Dept: UROLOGY | Facility: MEDICAL CENTER | Age: 59
End: 2024-05-03
Payer: COMMERCIAL

## 2024-05-03 VITALS
OXYGEN SATURATION: 97 % | WEIGHT: 199.2 LBS | HEIGHT: 67 IN | SYSTOLIC BLOOD PRESSURE: 116 MMHG | BODY MASS INDEX: 31.27 KG/M2 | HEART RATE: 59 BPM | DIASTOLIC BLOOD PRESSURE: 70 MMHG

## 2024-05-03 DIAGNOSIS — N52.8 MIXED ERECTILE DYSFUNCTION: ICD-10-CM

## 2024-05-03 DIAGNOSIS — N40.1 BPH WITH URINARY OBSTRUCTION: ICD-10-CM

## 2024-05-03 DIAGNOSIS — N13.8 BPH WITH URINARY OBSTRUCTION: ICD-10-CM

## 2024-05-03 DIAGNOSIS — R97.20 ELEVATED PROSTATE SPECIFIC ANTIGEN (PSA): Primary | ICD-10-CM

## 2024-05-03 PROCEDURE — 99213 OFFICE O/P EST LOW 20 MIN: CPT | Performed by: UROLOGY

## 2024-05-03 NOTE — PROGRESS NOTES
"   HISTORY:    Follow-up BPH, ED, and elevated PSA    He feels daily Cialis helps better than alpha blockers did.  Also had severe side effects of all BPH meds we tried     Good stream and control, nocturia times 1-2 depending on liquid intake.    Only issue is that if he holds it too long waits to go, then it might be tough to start the flow.     Biopsy in 2017 was negative, 60 g benign     Testosterone 440 in March 2022.    458 in April 2021      PSA 5.7 in January 2024 at [facility]  5.6 in September 2022.  5.3 in September 2021.      October 2020, 5.1.     April 2019, 4.4    Testosterone 468 in February 2024    Mild renal insufficiency, creatinine 1.5 in January 2024         ASSESSMENT / PLAN:    Doing well    Continue to check yearly with PSA    The following portions of the patient's history were reviewed and updated as appropriate: allergies, current medications, past family history, past medical history, past social history, past surgical history, and problem list.    Review of Systems      Objective:     Physical Exam  Genitourinary:     Comments: Penis testes normal    Prostate moderately large no nodules          0   Lab Value Date/Time    PSA 4.4 (H) 03/29/2019 1220   ]  BUN   Date Value Ref Range Status   02/24/2024 19 7 - 25 mg/dL Final   05/02/2023 24 (H) 8 - 20 mg/dL Final     Creatinine   Date Value Ref Range Status   02/24/2024 1.50 (H) 0.70 - 1.30 mg/dL Final   05/02/2023 1.40 (H) 0.64 - 1.27 mg/dL Final     No components found for: \"CBC\"      Patient Active Problem List   Diagnosis    Essential hypertension    Myofascial pain syndrome    Elevated serum creatinine    BPH with urinary obstruction    Elevated prostate specific antigen (PSA)    Angiomyolipoma of kidney    Mixed erectile dysfunction    Impingement syndrome of right shoulder    Thrombocytopenia (HCC)    Urticaria    Hemorrhoids, external, thrombosed    Impingement syndrome of left shoulder    Subacromial bursitis of right shoulder joint    " Papilloma of palate    Benign paroxysmal positional vertigo    Congenital foot deformity    Family history of heart disease    Elevated coronary artery calcium score        Diagnoses and all orders for this visit:    Elevated prostate specific antigen (PSA)  -     PSA Total, Diagnostic; Future    BPH with urinary obstruction    Mixed erectile dysfunction           Patient ID: Clif Li is a 58 y.o. male.      Current Outpatient Medications:     amLODIPine (NORVASC) 10 mg tablet, TAKE ONE TABLET BY MOUTH EVERY DAY, Disp: 90 tablet, Rfl: 3    Ascorbic Acid (EMILY-C PO), Take by mouth, Disp: , Rfl:     betamethasone dipropionate (DIPROSONE) 0.05 % cream, APPLY ONCE TO TWICE A DAY FOR 1 TO 2 WEEKS TO THE ARMS AND LEGS AS NEEDED FOR FLARES, Disp: , Rfl:     Cholecalciferol (VITAMIN D3) 2000 units TABS, Take 1 tablet by mouth daily  , Disp: , Rfl:     Diovan 160 MG tablet, Take 1 tablet (160 mg total) by mouth daily, Disp: 90 tablet, Rfl: 3    guaifenesin-codeine (GUAIFENESIN AC) 100-10 MG/5ML liquid, Take 5 mL by mouth 3 (three) times a day as needed for cough, Disp: 180 mL, Rfl: 0    Kerendia 20 MG TABS, Take 1 tablet by mouth daily, Disp: , Rfl:     Misc Natural Products (TURMERIC CURCUMIN) CAPS, Take 1 capsule by mouth 2 (two) times a day 2400mg twice daily , Disp: , Rfl:     Multiple Vitamins-Minerals (CENTRUM SILVER 50+MEN PO), Take by mouth, Disp: , Rfl:     Sodium Fluoride 5000 Sensitive 1.1-5 % GEL, BRUSH DAILY AS DIRECTED, Disp: , Rfl:     tadalafil (CIALIS) 5 MG tablet, Take 1 tablet (5 mg total) by mouth daily, Disp: 90 tablet, Rfl: 3    Zinc Sulfate (ZINC 15 PO), Take by mouth, Disp: , Rfl:     Past Medical History:   Diagnosis Date    Benign prostatic hyperplasia     Herpes simplex     RESOLVED [id number]    Hypertension     Renal disorder        Past Surgical History:   Procedure Laterality Date    COLONOSCOPY  12/23/2015    DR GUARDADO    PROSTATE BIOPSY         Social History

## 2024-05-08 ENCOUNTER — HOSPITAL ENCOUNTER (OUTPATIENT)
Dept: NON INVASIVE DIAGNOSTICS | Facility: HOSPITAL | Age: 59
Discharge: HOME | End: 2024-05-08
Payer: COMMERCIAL

## 2024-05-08 VITALS
HEART RATE: 61 BPM | SYSTOLIC BLOOD PRESSURE: 110 MMHG | OXYGEN SATURATION: 98 % | WEIGHT: 199 LBS | DIASTOLIC BLOOD PRESSURE: 72 MMHG | HEIGHT: 67 IN | BODY MASS INDEX: 31.23 KG/M2

## 2024-05-08 VITALS
BODY MASS INDEX: 31.23 KG/M2 | HEART RATE: 59 BPM | HEIGHT: 67 IN | SYSTOLIC BLOOD PRESSURE: 116 MMHG | DIASTOLIC BLOOD PRESSURE: 70 MMHG | WEIGHT: 199 LBS

## 2024-05-08 DIAGNOSIS — I10 ESSENTIAL HYPERTENSION: ICD-10-CM

## 2024-05-08 DIAGNOSIS — R06.00 DYSPNEA, UNSPECIFIED TYPE: ICD-10-CM

## 2024-05-08 LAB
AORTIC ROOT: 3.2 CM
APICAL FOUR CHAMBER EJECTION FRACTION: 72 %
ASCENDING AORTA: 3.9 CM
BSA FOR ECHO PROCEDURE: 2.02 M2
E WAVE DECELERATION TIME: 160 MS
E/A RATIO: 1.52
FRACTIONAL SHORTENING: 43 (ref 28–44)
INTERVENTRICULAR SEPTUM IN DIASTOLE (PARASTERNAL SHORT AXIS VIEW): 0.9 CM
INTERVENTRICULAR SEPTUM: 0.9 CM (ref 0.6–1.1)
IVC: 1 MM
LAAS-AP2: 22.6 CM2
LAAS-AP4: 20.2 CM2
LEFT ATRIUM SIZE: 4.1 CM
LEFT INTERNAL DIMENSION IN SYSTOLE: 3.2 CM (ref 2.1–4)
LEFT VENTRICLE DIASTOLIC VOLUME (MOD BIPLANE): 66 ML
LEFT VENTRICLE DIASTOLIC VOLUME INDEX (MOD BIPLANE): 32.7 ML/M2
LEFT VENTRICLE SYSTOLIC VOLUME (MOD BIPLANE): 22 ML
LEFT VENTRICLE SYSTOLIC VOLUME INDEX (MOD BIPLANE): 10.9 ML/M2
LEFT VENTRICULAR INTERNAL DIMENSION IN DIASTOLE: 5.6 CM (ref 3.5–6)
LEFT VENTRICULAR POSTERIOR WALL IN END DIASTOLE: 0.8 CM
LEFT VENTRICULAR STROKE VOLUME: 114 ML
LV EF: 66 %
LVSV (TEICH): 114 ML
MAX HR PERCENT: 13 %
MAX HR: 0 BPM
MV E'TISSUE VEL-SEP: 11 CM/S
MV PEAK A VEL: 0.62 M/S
MV PEAK E VEL: 94 CM/S
MV STENOSIS PRESSURE HALF TIME: 46 MS
MV VALVE AREA P 1/2 METHOD: 4.78
RA PRESSURE ESTIMATED: 3 MMHG
RATE PRESSURE PRODUCT: NORMAL
RIGHT ATRIUM AREA SYSTOLE A4C: 17.5 CM2
RIGHT VENTRICLE ID DIMENSION: 3.7 CM
RV PSP: 14 MMHG
SINOTUBULAR JUNCTION: 3 CM
SL CV LEFT ATRIUM LENGTH A2C: 6.2 CM
SL CV LV EF: 65
SL CV PED ECHO LEFT VENTRICLE DIASTOLIC VOLUME (MOD BIPLANE) 2D: 157 ML
SL CV PED ECHO LEFT VENTRICLE SYSTOLIC VOLUME (MOD BIPLANE) 2D: 43 ML
SL CV SINUS OF VALSALVA 2D: 3.5 CM
SL CV STRESS RECOVERY BP: 72 MMHG
SL CV STRESS RECOVERY HR: 117 BPM
SL CV STRESS RECOVERY O2 SAT: 11 %
SL CV STRESS STAGE REACHED: 4
STJ: 3 CM
STRESS ANGINA INDEX: 0
STRESS BASELINE BP: NORMAL MMHG
STRESS BASELINE HR: 61 BPM
STRESS O2 SAT REST: 98 %
STRESS PEAK HR: 138 BPM
STRESS POST O2 SAT PEAK: 98 %
STRESS POST PEAK BP: 130 MMHG
TR MAX PG: 11 MMHG
TR PEAK VELOCITY: 1.6 M/S
TRICUSPID ANNULAR PLANE SYSTOLIC EXCURSION: 2.6 CM
TRICUSPID VALVE PEAK REGURGITATION VELOCITY: 1.62 M/S

## 2024-05-08 PROCEDURE — 93016 CV STRESS TEST SUPVJ ONLY: CPT | Performed by: INTERNAL MEDICINE

## 2024-05-08 PROCEDURE — 93306 TTE W/DOPPLER COMPLETE: CPT

## 2024-05-08 PROCEDURE — 93018 CV STRESS TEST I&R ONLY: CPT | Performed by: INTERNAL MEDICINE

## 2024-05-08 PROCEDURE — 93306 TTE W/DOPPLER COMPLETE: CPT | Performed by: INTERNAL MEDICINE

## 2024-05-08 PROCEDURE — 93017 CV STRESS TEST TRACING ONLY: CPT

## 2024-05-18 NOTE — PROGRESS NOTES
Cardiology and Heart Failure Clinic Note    Clif Li 58 y.o. male   MRN: [id number]  Encounter: [id number]        Assessment / Plan:    # Coronary artery calcification  Calcium score 4/2024 -  310  Discussed that this is consistent with atherosclerosis  Data suggests CAC score > 300 carries similar risk for CVD events as those with established ASCVD  EKG:  No Q waves  Echo:  normal EF  Stress:  no ischemia  Lipids:  LDL 74  LP(a):   check this  Recommend statin, start lipitor 10mg daily (pt prefers to start with low dose)  Rec ASA 81 mg daily  Good BP control important.  Healthy diet discussed    # HTN    Norvasc 10mg daily    Diovan (valsartan) 160mg daily    Kerendia (finerenone) 20mg daily    Cialis daily (for BPH)  Follows with nephrology at [facility]    # CKD  Follows with nephrology at [facility]  Last Cr 1.5    # mild thrombocytopenia  Monitor on aspirin    # Dilated ascending aorta  3.9 cm by echo  Serial follow up - repeat echo 1 year      Today's Plan Summary:  See above assessment/plan for full details of today's plan.  Briefly,     Check LP(a)  Start aspirin 81  Start statin                  Reason For Visit / Chief Complaint:  Referred by Dr. Gagnon for a new patient visit for high coronary calcium score.    HPI:   Clif Li is a 58 y.o.  male with history as noted in the problem list and further detailed in the above assessment and plan.    Referred by Dr. Gagnon for a new patient visit for high coronary calcium score.    As above, the patient has a history of hypertension and CKD.  Follows with nephrology.  Recently, a coronary calcium score was elevated at 310.  An echo showed normal EF and a EKG stress test was without ischemia.  The patient was referred to cardiology.    Today, the patient reports -  no chest pain.   No SOB.  No BEAUCHAMP.  No orthopnea or PND.   No leg edema.  Palpitations with too much caffeine.   No syncope.    Work for [employer], leads training team, works on [employer].     Wife is a patent works in [employer].     Never smoker.   Rare ETOH.   No drugs.        Cardiac Imaging personally reviewed:  EKG 5-20-24  Sinus rhythm  Incomplete right bundle branch block  Left anterior fascicular block  Poor R wave progression       Holter or event monitor    Echo 5-8-24  EF 65%  The ascending aorta is 3.9 cm          CARLIE    Cardiac MRI    Stress testing 5-2024 - EKG stress  No ischemia   Coronary CTA or [facility]    RHC    CPET              Patient Active Problem List    Diagnosis Date Noted   • Stage 3a chronic kidney disease ([facility]) 05/20/2024   • Ascending aorta dilatation ([facility]) 05/20/2024   • Coronary artery calcification 04/11/2024   • Family history of heart disease 03/07/2024   • Congenital foot deformity 05/03/2023   • Benign paroxysmal positional vertigo 08/29/2021   • Papilloma of palate 01/02/2020   • Subacromial bursitis of right shoulder joint 07/30/2019   • Impingement syndrome of left shoulder 05/30/2019   • Hemorrhoids, external, thrombosed 03/22/2019   • Urticaria 01/19/2019   • BPH with urinary obstruction 08/01/2018   • Elevated prostate specific antigen (PSA) 08/01/2018   • Elevated serum creatinine 04/04/2018   • Myofascial pain syndrome 01/21/2018   • Impingement syndrome of right shoulder 10/17/2017   • Mixed erectile dysfunction 06/15/2016   • Angiomyolipoma of kidney 04/11/2016   • Thrombocytopenia ([facility]) 01/10/2013   • Essential hypertension 09/25/2012       Past Medical History:   Diagnosis Date   • Benign prostatic hyperplasia    • Herpes simplex     RESOLVED [id number]   • Hypertension    • Renal disorder        Review of Systems   Constitutional:  Negative for chills and fever.   HENT:  Negative for nosebleeds.    Gastrointestinal:  Negative for abdominal distention, abdominal pain and blood in stool.   Neurological:  Positive for dizziness (when bending over). Negative for syncope.   Psychiatric/Behavioral:  Negative for confusion.    14-point ROS completed and negative  except as stated above and/or in the HPI.    Allergies   Allergen Reactions   • Moxifloxacin Itching and Edema     Category: Allergy;    • Lisinopril Rash   • Valsartan Rash       Current Outpatient Medications   Medication Instructions   • amLODIPine (NORVASC) 10 mg, Oral, Daily   • aspirin (ECOTRIN LOW STRENGTH) 81 mg, Oral, Daily   • atorvastatin (LIPITOR) 10 mg, Oral, Daily at bedtime   • betamethasone dipropionate (DIPROSONE) 0.05 % cream APPLY ONCE TO TWICE A DAY FOR 1 TO 2 WEEKS TO THE ARMS AND LEGS AS NEEDED FOR FLARES   • Cholecalciferol (VITAMIN D3) 2000 units TABS 1 tablet, Oral, Daily   • Diovan 160 mg, Oral, Daily   • Kerendia 20 MG TABS 1 tablet, Oral, Daily   • Misc Natural Products (TURMERIC CURCUMIN) CAPS 1 capsule, Oral, 2 times daily, 2400mg twice daily   • Multiple Vitamins-Minerals (CENTRUM SILVER 50+MEN PO) Oral   • Sodium Fluoride 5000 Sensitive 1.1-5 % GEL BRUSH DAILY AS DIRECTED   • tadalafil (CIALIS) 5 mg, Oral, Daily       Social History     Socioeconomic History   • Marital status: /Civil Union     Spouse name: Ivana    • Number of children: 2   • Years of education: Not on file   • Highest education level: Not on file   Occupational History   • Occupation: /[employer]   Tobacco Use   • Smoking status: Never   • Smokeless tobacco: Never   Vaping Use   • Vaping status: Never Used   Substance and Sexual Activity   • Alcohol use: No   • Drug use: No   • Sexual activity: Yes   Other Topics Concern   • Not on file   Social History Narrative   • Not on file     Social Determinants of Health     Financial Resource Strain: Not on file   Food Insecurity: Not on file   Transportation Needs: Not on file   Physical Activity: Sufficiently Active (8/13/2020)    Exercise Vital Sign    • Days of Exercise per Week: 6 days    • Minutes of Exercise per Session: 80 min   Stress: Not on file   Social Connections: Not on file   Intimate Partner Violence: Not on file   Housing Stability: Not on  "file       Family History   Problem Relation Age of Onset   • Other Mother         CABG age 70s   • Diabetes Mother         type 2   • Hypertension Mother    • Hypertension Father    • Heart attack Father         in his 60s but was big smoker   • Hypertension Sister    • Hypertension Brother    • Celiac disease Brother    • Eczema Daughter    • No Known Problems Son    • Kidney cancer Family    • Prostate cancer Family        Physical Exam:  Blood pressure 130/76, pulse 65, height 5' 7\" (1.702 m), weight 89.9 kg (198 lb 3.2 oz), SpO2 97%.  Body mass index is 31.04 kg/m².  Wt Readings from Last 3 Encounters:   05/20/24 89.9 kg (198 lb 3.2 oz)   05/08/24 90.3 kg (199 lb)   05/08/24 90.3 kg (199 lb)     Physical Exam  Vitals reviewed.   Constitutional:       General: He is not in acute distress.     Appearance: He is not toxic-appearing.   HENT:      Head: Normocephalic and atraumatic.   Eyes:      General: No scleral icterus.     Conjunctiva/sclera: Conjunctivae normal.   Neck:      Vascular: No carotid bruit.      Comments: No JVP elevation  Cardiovascular:      Rate and Rhythm: Normal rate and regular rhythm.      Heart sounds: No murmur heard.     No friction rub. No gallop.   Pulmonary:      Breath sounds: Normal breath sounds. No wheezing, rhonchi or rales.   Abdominal:      General: There is no distension.      Palpations: Abdomen is soft.      Tenderness: There is no abdominal tenderness. There is no guarding.   Musculoskeletal:      Right lower leg: No edema.      Left lower leg: No edema.   Skin:     Coloration: Skin is not jaundiced or pale.      Findings: No erythema.   Neurological:      Mental Status: He is alert. Mental status is at baseline.   Psychiatric:         Mood and Affect: Mood normal.         Behavior: Behavior normal.         Labs & Results:  Lab Results   Component Value Date    SODIUM 139 02/24/2024    K 4.1 02/24/2024     02/24/2024    CO2 26 02/24/2024    BUN 19 02/24/2024    " CREATININE 1.50 (H) 02/24/2024    GLUC 89 02/24/2024    CALCIUM 8.9 02/24/2024         Thank you for the opportunity to participate in the care of this patient.    Warner Miller MD, [facility]  Advanced Heart Failure and Transplant Cardiologist  Director of Cardio-Oncology  [facility]

## 2024-05-20 ENCOUNTER — CONSULT (OUTPATIENT)
Dept: CARDIOLOGY CLINIC | Facility: CLINIC | Age: 59
End: 2024-05-20
Payer: COMMERCIAL

## 2024-05-20 VITALS
SYSTOLIC BLOOD PRESSURE: 130 MMHG | OXYGEN SATURATION: 97 % | HEART RATE: 65 BPM | DIASTOLIC BLOOD PRESSURE: 76 MMHG | WEIGHT: 198.2 LBS | BODY MASS INDEX: 31.11 KG/M2 | HEIGHT: 67 IN

## 2024-05-20 DIAGNOSIS — I25.84 CORONARY ARTERY CALCIFICATION: Primary | ICD-10-CM

## 2024-05-20 DIAGNOSIS — R93.1 ELEVATED CORONARY ARTERY CALCIUM SCORE: ICD-10-CM

## 2024-05-20 DIAGNOSIS — I25.10 CORONARY ARTERY CALCIFICATION: Primary | ICD-10-CM

## 2024-05-20 DIAGNOSIS — I10 PRIMARY HYPERTENSION: ICD-10-CM

## 2024-05-20 DIAGNOSIS — D69.6 THROMBOCYTOPENIA (HCC): ICD-10-CM

## 2024-05-20 DIAGNOSIS — I77.810 ASCENDING AORTA DILATATION (HCC): ICD-10-CM

## 2024-05-20 DIAGNOSIS — N18.31 STAGE 3A CHRONIC KIDNEY DISEASE (HCC): ICD-10-CM

## 2024-05-20 DIAGNOSIS — E78.2 MIXED HYPERLIPIDEMIA: ICD-10-CM

## 2024-05-20 PROCEDURE — 99244 OFF/OP CNSLTJ NEW/EST MOD 40: CPT | Performed by: INTERNAL MEDICINE

## 2024-05-20 PROCEDURE — 93000 ELECTROCARDIOGRAM COMPLETE: CPT | Performed by: INTERNAL MEDICINE

## 2024-05-20 RX ORDER — ASPIRIN 81 MG/1
81 TABLET ORAL DAILY
Start: 2024-05-20

## 2024-05-20 RX ORDER — ATORVASTATIN CALCIUM 10 MG/1
10 TABLET, FILM COATED ORAL
Qty: 90 TABLET | Refills: 3 | Status: SHIPPED | OUTPATIENT
Start: 2024-05-20

## 2024-05-28 ENCOUNTER — OFFICE VISIT (OUTPATIENT)
Dept: PHYSICAL THERAPY | Facility: OTHER | Age: 59
End: 2024-05-28
Payer: COMMERCIAL

## 2024-05-28 DIAGNOSIS — M21.41 BILATERAL PES PLANUS: Primary | ICD-10-CM

## 2024-05-28 DIAGNOSIS — M21.42 BILATERAL PES PLANUS: Primary | ICD-10-CM

## 2024-05-28 PROCEDURE — 97760 ORTHOTIC MGMT&TRAING 1ST ENC: CPT | Performed by: PHYSICAL THERAPIST

## 2024-05-28 NOTE — PROGRESS NOTES
Patient reports the pair of orthotics for his dress shoes are working well and he is ready to order an additional pair. He notes the sneaker pair are working well for lifting but bother him with prolonged walking. States with prolonged walking he felt better without the medial clip. Therefore, discussed making a second pair without the clip.

## 2024-05-30 LAB — LPA SERPL-SCNC: <10 NMOL/L

## 2024-06-17 ENCOUNTER — APPOINTMENT (OUTPATIENT)
Dept: PHYSICAL THERAPY | Facility: OTHER | Age: 59
End: 2024-06-17
Payer: COMMERCIAL

## 2024-06-21 ENCOUNTER — OFFICE VISIT (OUTPATIENT)
Dept: PHYSICAL THERAPY | Facility: OTHER | Age: 59
End: 2024-06-21
Payer: COMMERCIAL

## 2024-06-21 DIAGNOSIS — M21.41 BILATERAL PES PLANUS: Primary | ICD-10-CM

## 2024-06-21 DIAGNOSIS — M21.42 BILATERAL PES PLANUS: Primary | ICD-10-CM

## 2024-06-21 PROCEDURE — L3010 FOOT LONGITUDINAL ARCH SUPPO: HCPCS

## 2024-06-21 NOTE — PROGRESS NOTES
Two pairs of orthotics were fitted to patient - a street-cut pair for dress shoes and a sport-cut pair for athletic sneakers. Street-cut pair dispensed to patient, he did not bring dress shoes so instructed to try them out at home and return if unhappy. Pt uncomfortable in sport-cut pair, specifically R foot. Pt requests R navicular cut-out similar to prior pairs of orthotics. When standing on orthotics outside of shoe, pt's navicular spills overtop of orthotic and causes pain. Recommended medial clip which pt previously had removed - pt not interested in medial clip, prefers navicular cut-out. Orthotics to be sent back to lab for alterations.

## 2024-07-11 ENCOUNTER — OFFICE VISIT (OUTPATIENT)
Dept: FAMILY MEDICINE CLINIC | Facility: CLINIC | Age: 59
End: 2024-07-11
Payer: COMMERCIAL

## 2024-07-11 ENCOUNTER — PATIENT MESSAGE (OUTPATIENT)
Dept: FAMILY MEDICINE CLINIC | Facility: CLINIC | Age: 59
End: 2024-07-11

## 2024-07-11 ENCOUNTER — HOSPITAL ENCOUNTER (OUTPATIENT)
Dept: RADIOLOGY | Facility: HOSPITAL | Age: 59
Discharge: HOME | End: 2024-07-11
Payer: COMMERCIAL

## 2024-07-11 VITALS
SYSTOLIC BLOOD PRESSURE: 128 MMHG | HEART RATE: 67 BPM | DIASTOLIC BLOOD PRESSURE: 82 MMHG | BODY MASS INDEX: 30.95 KG/M2 | OXYGEN SATURATION: 97 % | WEIGHT: 197.2 LBS | TEMPERATURE: 98.2 F | RESPIRATION RATE: 16 BRPM | HEIGHT: 67 IN

## 2024-07-11 DIAGNOSIS — I25.84 CORONARY ARTERY CALCIFICATION: ICD-10-CM

## 2024-07-11 DIAGNOSIS — I25.10 CORONARY ARTERY CALCIFICATION: ICD-10-CM

## 2024-07-11 DIAGNOSIS — R10.9 UNSPECIFIED ABDOMINAL PAIN: ICD-10-CM

## 2024-07-11 DIAGNOSIS — I10 ESSENTIAL HYPERTENSION: ICD-10-CM

## 2024-07-11 DIAGNOSIS — N18.31 CHRONIC KIDNEY DISEASE, STAGE 3A (HCC): ICD-10-CM

## 2024-07-11 DIAGNOSIS — N13.8 BPH WITH URINARY OBSTRUCTION: ICD-10-CM

## 2024-07-11 DIAGNOSIS — N40.1 BPH WITH URINARY OBSTRUCTION: ICD-10-CM

## 2024-07-11 DIAGNOSIS — M54.50 BILATERAL LOW BACK PAIN WITHOUT SCIATICA, UNSPECIFIED CHRONICITY: Primary | ICD-10-CM

## 2024-07-11 DIAGNOSIS — N18.31 STAGE 3A CHRONIC KIDNEY DISEASE (HCC): ICD-10-CM

## 2024-07-11 PROCEDURE — 99214 OFFICE O/P EST MOD 30 MIN: CPT | Performed by: FAMILY MEDICINE

## 2024-07-11 PROCEDURE — 76775 US EXAM ABDO BACK WALL LIM: CPT

## 2024-07-11 NOTE — PATIENT INSTRUCTIONS
Urine testing  Xray lumbar  spine  PSA  Consider reducing dose of Diovan to 1/2 tab  May consider trial of Lipitor 10 mg 2-3 times per week

## 2024-07-11 NOTE — PROGRESS NOTES
Ambulatory Visit  Name: Clif Li      : 1965      MRN: [id number]  Encounter Provider: Ellie Gagnon MD  Encounter Date: 2024   Encounter department: [facility]    Assessment & Plan   1. Bilateral low back pain without sciatica, unspecified chronicity  Assessment & Plan:  Patient presents for evaluation of low back pain.  No clinical signs of renal colic (denies symptoms of nausea vomiting, severe flank pain, dysuria or hematuria).  No sciatica.  Patient is scheduled for kidney/bladder ultrasound later today as per [facility] nephrologist.  Proceed with x-ray of lumbar spine and urine testing.  Start supportive care with topical remedies, inversion table, home PT  Patient will benefit from muscle relaxant, reluctant to take medication since symptoms overall are not too bothersome.  Orders:  -     Urine culture  -     Urinalysis with microscopic; Future  -     XR spine lumbar minimum 4 views non injury; Future; Expected date: 2024  2. BPH with urinary obstruction  -     PSA Total, Diagnostic; Future  3. Essential hypertension  Assessment & Plan:  Patient reports intermittent symptoms of orthostasis and is concerned about lower BP.  He remains under care of nephrology at [facility].  Creatinine is at baseline around 1.5.  Patient can reduce dose of Diovan from 160 to 80 mg daily.  Keep amlodipine 10 mg daily and continue monitoring BP.  He will discuss with   4. Coronary artery calcification  Assessment & Plan:  Recent evaluation by [facility] cardiology due to family history of CAD  Coronary calcium score is 310 (2024)  Recent echo and stress test were normal.  Patient is reluctant to start statin therapy since his baseline cholesterol was essentially normal  We discussed pathophysiology of coronary atherosclerosis.I believe patient will benefit from further cholesterol reduction.  He will consider trialing atorvastatin 10 mg 2-3 times per week.  5. Stage 3a  chronic kidney disease ([facility])  Assessment & Plan:  Lab Results   Component Value Date    EGFR 54 (L) 02/24/2024    EGFR 59 (L) 05/02/2023    EGFR 56 (L) 12/12/2022    CREATININE 1.50 (H) 02/24/2024    CREATININE 1.40 (H) 05/02/2023    CREATININE 1.45 (H) 12/12/2022   The patient is under care of nephrology at [facility].  Creatinine has been stable around 1.5       Patient Instructions   Urine testing  Xray lumbar  spine  PSA  Consider reducing dose of Diovan to 1/2 tab  May consider trial of Lipitor 10 mg 2-3 times per week      History of Present Illness     Patient presents for evaluation of lower back pain.  Symptoms started 2 weeks ago.  No preceding injury or fall.  No unusual workouts.  He remains very physically active.  Same exercise routine.  No sciatica.  Patient denies symptoms of nausea vomiting abdominal pain, dysuria or gross hematuria.  He contacted his nephrologist due to concern of kidney stones and is scheduled for kidney/bladder ultrasound this afternoon.  No personal history of kidney stones.    Hypertension.  CRI.  He follows with nephrology.  Current dose of amlodipine is 10 mg daily, Diovan is 160 mg daily.  Patient has been noticing increased symptoms of orthostasis and wonders if his BP has been running a bit low.He had recent evaluation by [facility] cardiology due to coronary calcium score elevation and strong family history of CAD.  Cardiology recommended Lipitor 10 mg daily but patient is quite reluctant to start since he has baseline lipid panel appears normal.  He is worried about side effects of statins.      Abdominal Pain      Review of Systems   Gastrointestinal:  Positive for abdominal pain.     Past Medical History:   Diagnosis Date   • Benign prostatic hyperplasia    • Herpes simplex     RESOLVED [id number]   • Hypertension    • Renal disorder      Past Surgical History:   Procedure Laterality Date   • COLONOSCOPY  12/23/2015    DR GUARDADO   • PROSTATE BIOPSY       Family History    Problem Relation Age of Onset   • Other Mother         CABG age 70s   • Diabetes Mother         type 2   • Hypertension Mother    • Hypertension Father    • Heart attack Father         in his 60s but was big smoker   • Hypertension Sister    • Hypertension Brother    • Celiac disease Brother    • Eczema Daughter    • No Known Problems Son    • Kidney cancer Family    • Prostate cancer Family      Social History     Tobacco Use   • Smoking status: Never   • Smokeless tobacco: Never   Vaping Use   • Vaping status: Never Used   Substance and Sexual Activity   • Alcohol use: No   • Drug use: No   • Sexual activity: Yes     Current Outpatient Medications on File Prior to Visit   Medication Sig   • amLODIPine (NORVASC) 10 mg tablet TAKE ONE TABLET BY MOUTH EVERY DAY   • aspirin (ECOTRIN LOW STRENGTH) 81 mg EC tablet Take 1 tablet (81 mg total) by mouth daily   • atorvastatin (LIPITOR) 10 mg tablet Take 1 tablet (10 mg total) by mouth daily at bedtime   • betamethasone dipropionate (DIPROSONE) 0.05 % cream APPLY ONCE TO TWICE A DAY FOR 1 TO 2 WEEKS TO THE ARMS AND LEGS AS NEEDED FOR FLARES   • Cholecalciferol (VITAMIN D3) 2000 units TABS Take 1 tablet by mouth daily     • Diovan 160 MG tablet Take 1 tablet (160 mg total) by mouth daily   • Kerendia 20 MG TABS Take 1 tablet by mouth daily   • Misc Natural Products (TURMERIC CURCUMIN) CAPS Take 1 capsule by mouth 2 (two) times a day 2400mg twice daily    • Multiple Vitamins-Minerals (CENTRUM SILVER 50+MEN PO) Take by mouth   • Sodium Fluoride 5000 Sensitive 1.1-5 % GEL BRUSH DAILY AS DIRECTED   • tadalafil (CIALIS) 5 MG tablet Take 1 tablet (5 mg total) by mouth daily     Allergies   Allergen Reactions   • Moxifloxacin Itching and Edema     Category: Allergy;    • Lisinopril Rash   • Valsartan Rash     Immunization History   Administered Date(s) Administered   • COVID-19 MODERNA VACC 0.5 ML IM 03/17/2021, 04/14/2021, 10/29/2021   • INFLUENZA 08/20/2016, 10/20/2017   •  "Influenza Quadrivalent Preservative Free 3 years and older IM 10/20/2017   • Influenza, seasonal, injectable 08/20/2016     Objective     /82 (BP Location: Left arm, Patient Position: Sitting, Cuff Size: Standard)   Pulse 67   Temp 98.2 °F (36.8 °C) (Temporal)   Resp 16   Ht 5' 7\" (1.702 m)   Wt 89.4 kg (197 lb 3.2 oz)   SpO2 97%   BMI 30.89 kg/m²     Physical Exam  Vitals and nursing note reviewed.   Constitutional:       Appearance: He is well-developed.   Abdominal:      Tenderness: There is no right CVA tenderness or left CVA tenderness.   Musculoskeletal:      Comments: Paraspinal spasm lumbar spine   Neurological:      Mental Status: He is alert.         "

## 2024-07-14 PROBLEM — M54.50 BILATERAL LOW BACK PAIN WITHOUT SCIATICA: Status: ACTIVE | Noted: 2024-07-14

## 2024-07-14 PROBLEM — R79.89 ELEVATED SERUM CREATININE: Chronic | Status: RESOLVED | Noted: 2018-04-04 | Resolved: 2024-07-14

## 2024-07-14 LAB
APPEARANCE UR: CLEAR
BACTERIA UR QL AUTO: NORMAL /HPF
BILIRUB UR QL STRIP: NEGATIVE
COLOR UR: YELLOW
GLUCOSE UR QL STRIP: NEGATIVE
HGB UR QL STRIP: NEGATIVE
HYALINE CASTS #/AREA URNS LPF: NORMAL /LPF
KETONES UR QL STRIP: NEGATIVE
LEUKOCYTE ESTERASE UR QL STRIP: NEGATIVE
NITRITE UR QL STRIP: NEGATIVE
PH UR STRIP: 7 [PH] (ref 5–8)
PROT UR QL STRIP: NEGATIVE
PSA SERPL-MCNC: 6.22 NG/ML
RBC #/AREA URNS HPF: NORMAL /HPF
SP GR UR STRIP: 1.01 (ref 1–1.03)
SQUAMOUS #/AREA URNS HPF: NORMAL /HPF
WBC #/AREA URNS HPF: NORMAL /HPF

## 2024-07-14 NOTE — ASSESSMENT & PLAN NOTE
Recent evaluation by [facility] cardiology due to family history of CAD  Coronary calcium score is 310 (April 2024)  Recent echo and stress test were normal.  Patient is reluctant to start statin therapy since his baseline cholesterol was essentially normal  We discussed pathophysiology of coronary atherosclerosis.I believe patient will benefit from further cholesterol reduction.  He will consider trialing atorvastatin 10 mg 2-3 times per week.

## 2024-07-14 NOTE — ASSESSMENT & PLAN NOTE
Lab Results   Component Value Date    EGFR 54 (L) 02/24/2024    EGFR 59 (L) 05/02/2023    EGFR 56 (L) 12/12/2022    CREATININE 1.50 (H) 02/24/2024    CREATININE 1.40 (H) 05/02/2023    CREATININE 1.45 (H) 12/12/2022   The patient is under care of nephrology at [facility].  Creatinine has been stable around 1.5

## 2024-07-14 NOTE — ASSESSMENT & PLAN NOTE
Patient reports intermittent symptoms of orthostasis and is concerned about lower BP.  He remains under care of nephrology at [facility].  Creatinine is at baseline around 1.5.  Patient can reduce dose of Diovan from 160 to 80 mg daily.  Keep amlodipine 10 mg daily and continue monitoring BP.  He will discuss with

## 2024-07-14 NOTE — ASSESSMENT & PLAN NOTE
Patient presents for evaluation of low back pain.  No clinical signs of renal colic (denies symptoms of nausea vomiting, severe flank pain, dysuria or hematuria).  No sciatica.  Patient is scheduled for kidney/bladder ultrasound later today as per [facility] nephrologist.  Proceed with x-ray of lumbar spine and urine testing.  Start supportive care with topical remedies, inversion table, home PT  Patient will benefit from muscle relaxant, reluctant to take medication since symptoms overall are not too bothersome.

## 2024-07-15 ENCOUNTER — TELEPHONE (OUTPATIENT)
Dept: UROLOGY | Facility: CLINIC | Age: 59
End: 2024-07-15

## 2024-07-15 DIAGNOSIS — R97.20 ELEVATED PROSTATE SPECIFIC ANTIGEN (PSA): Primary | ICD-10-CM

## 2024-07-15 NOTE — TELEPHONE ENCOUNTER
Pt called requesting to speak with the nurse once again, stated he has some additional questions he would like to discuss with her.     Call back: [phone]

## 2024-07-15 NOTE — TELEPHONE ENCOUNTER
Call placed to patient and spoke with him. Informed him of the AP recommendations and PSA results.   Pt is aware of results. He did inform nurse that he had a 4K test completed at [facility] last year.   He will try and fax over results when able as they are not visible in his chart.     He will contact central scheduling to arrange for MRI as recommended.

## 2024-07-15 NOTE — TELEPHONE ENCOUNTER
Please call the patient and advise him that the contrast used during MRI is gadolinium contrast which is different than the iodine contrast use during x-rays and CT scans.  This contrast has less effect on the kidney and he should be safe to undergo the MRI scan with a GFR 52.  Furthermore, an enlarged prostate of 112 g certainly can produce higher levels of PSA, but with his persistent rise in PSA it would be prudent to undergo the MRI imaging study to rule out any suspicious lesions that may indicate prostate cancer.  Finally, I do not think we need to repeat his 4K score at this time since it was last performed in 2023.  I recommendation at this time would be for the patient to proceed with undergoing the MRI of the prostate.

## 2024-07-15 NOTE — TELEPHONE ENCOUNTER
Call placed to patient and he had the following questions:    He is scheduled for his MRI, but it is ordered with contrast and he has GFR of 51. He is asking if this should be changed to without contrast.  Pt did have US done on 7- that showed he had 112g prostate and prostatomegaly. He is wondering if this is why his PSA level is elevated?  He had 4k testing done last August (2023). He is asking if he should have this repeated since it has been a year and with the recent elevation.     Will forward questions over to the AP team for further review and recommendation

## 2024-07-15 NOTE — TELEPHONE ENCOUNTER
Call placed to patient and spoke with him. Informed him of the AP recommendations and instructions. Pt is aware and will also check with his Nephrology team in regards to contrast that is being used for MRI.   He is scheduled 7- for MRI and will contact the office if anything further is needed.

## 2024-07-15 NOTE — TELEPHONE ENCOUNTER
known to Dr Maradiaga. negative biopsy several years ago. Chronically elevated PSA 5 range 5.7 earlier this year when he saw Dr Maradiaga. Current is 6.2 pcp just forwarded new results. I recommend MRI prostate at this time for the persistent rise

## 2024-07-15 NOTE — TELEPHONE ENCOUNTER
----- Message from Ellie Gagnon MD sent at 7/15/2024  7:26 AM EDT -----  Regarding: [facility] patient, PSA    ----- Message -----  From: [facility] [facility] Lab Results In  Sent: 7/14/2024   6:00 AM EDT  To: Ellie Gagnon MD

## 2024-07-29 ENCOUNTER — HOSPITAL ENCOUNTER (OUTPATIENT)
Facility: MEDICAL CENTER | Age: 59
Discharge: HOME | End: 2024-07-29
Payer: COMMERCIAL

## 2024-07-29 DIAGNOSIS — R97.20 ELEVATED PROSTATE SPECIFIC ANTIGEN (PSA): ICD-10-CM

## 2024-07-29 PROCEDURE — 72197 MRI PELVIS W/O & W/DYE: CPT

## 2024-07-29 PROCEDURE — A9585 GADOBUTROL INJECTION: HCPCS | Performed by: PHYSICIAN ASSISTANT

## 2024-07-29 PROCEDURE — 76377 3D RENDER W/INTRP POSTPROCES: CPT

## 2024-07-29 RX ORDER — GADOBUTROL 604.72 MG/ML
9 INJECTION INTRAVENOUS
Status: COMPLETED | OUTPATIENT
Start: 2024-07-29 | End: 2024-07-29

## 2024-07-29 RX ADMIN — GADOBUTROL 9 ML: 604.72 INJECTION INTRAVENOUS at 13:13

## 2024-07-31 ENCOUNTER — TELEPHONE (OUTPATIENT)
Dept: UROLOGY | Facility: MEDICAL CENTER | Age: 59
End: 2024-07-31

## 2024-08-05 ENCOUNTER — TELEPHONE (OUTPATIENT)
Dept: UROLOGY | Facility: MEDICAL CENTER | Age: 59
End: 2024-08-05

## 2024-08-05 NOTE — TELEPHONE ENCOUNTER
Spoke with patient and confirmed surgery date of: 10/9/2024  Type of surgery: MRI Fusion BX  Operating physician: Dr. Maradiaga  Location of surgery: [facility]    Verbally went over prep with patient on: 8/5/2024  NPO  Bowel prep? Yes, 1 enema 1 hour prior to leaving the house for the procedure  Hospital calls afternoon prior with arrival time -Calls Friday afternoon for Monday surgeries  Patient needs ride to and from surgery (outpatient/inpatient)   Pre-op testing to be done 2 weeks prior to surgery   CBC, CMP, Urine C&S  Blood thinners:   none  Clearances needed: none    Mailed packet on: 8/5/2024  Copy of packet scanned into [employer] on: 8/5/2024  Labs in packet  Soap instructions in packet  Pre-op & Post-op in packet  H&P on admit  PO 10/18/24      Consent: on admit

## 2024-08-05 NOTE — TELEPHONE ENCOUNTER
----- Message from Rico Maradiaga MD sent at 7/31/2024  3:23 PM EDT -----  Schedule fusion biopsy, category 4

## 2024-08-08 ENCOUNTER — APPOINTMENT (OUTPATIENT)
Dept: LAB | Facility: CLINIC | Age: 59
End: 2024-08-08
Payer: COMMERCIAL

## 2024-08-08 DIAGNOSIS — R97.20 ELEVATED PROSTATE SPECIFIC ANTIGEN (PSA): ICD-10-CM

## 2024-08-08 PROCEDURE — 36415 COLL VENOUS BLD VENIPUNCTURE: CPT

## 2024-09-13 ENCOUNTER — OFFICE VISIT (OUTPATIENT)
Dept: PHYSICAL THERAPY | Facility: OTHER | Age: 59
End: 2024-09-13
Payer: COMMERCIAL

## 2024-09-13 DIAGNOSIS — M21.42 PES PLANUS OF BOTH FEET: Primary | ICD-10-CM

## 2024-09-13 DIAGNOSIS — M21.41 PES PLANUS OF BOTH FEET: Primary | ICD-10-CM

## 2024-09-13 PROCEDURE — L3010 FOOT LONGITUDINAL ARCH SUPPO: HCPCS | Performed by: PHYSICAL THERAPIST

## 2024-09-13 NOTE — PROGRESS NOTES
Additional pair of custom orthotics fitted to patients shoe and dispensed. Patient educated on appropriate break in period. Patient will follow up if any future problems arise.

## 2024-09-16 ENCOUNTER — HOSPITAL ENCOUNTER (OUTPATIENT)
Dept: RADIOLOGY | Facility: HOSPITAL | Age: 59
Discharge: HOME | End: 2024-09-16
Payer: COMMERCIAL

## 2024-09-16 DIAGNOSIS — M54.50 BILATERAL LOW BACK PAIN WITHOUT SCIATICA, UNSPECIFIED CHRONICITY: ICD-10-CM

## 2024-09-16 PROCEDURE — 72110 X-RAY EXAM L-2 SPINE 4/>VWS: CPT

## 2024-09-26 ENCOUNTER — OFFICE VISIT (OUTPATIENT)
Dept: FAMILY MEDICINE CLINIC | Facility: CLINIC | Age: 59
End: 2024-09-26
Payer: COMMERCIAL

## 2024-09-26 VITALS
HEART RATE: 64 BPM | RESPIRATION RATE: 16 BRPM | TEMPERATURE: 98 F | OXYGEN SATURATION: 97 % | HEIGHT: 67 IN | DIASTOLIC BLOOD PRESSURE: 80 MMHG | BODY MASS INDEX: 30.98 KG/M2 | SYSTOLIC BLOOD PRESSURE: 120 MMHG | WEIGHT: 197.4 LBS

## 2024-09-26 DIAGNOSIS — G89.29 CHRONIC RIGHT-SIDED LOW BACK PAIN WITHOUT SCIATICA: ICD-10-CM

## 2024-09-26 DIAGNOSIS — I10 ESSENTIAL HYPERTENSION: ICD-10-CM

## 2024-09-26 DIAGNOSIS — N13.8 BPH WITH URINARY OBSTRUCTION: Primary | Chronic | ICD-10-CM

## 2024-09-26 DIAGNOSIS — I25.10 CORONARY ARTERY CALCIFICATION: ICD-10-CM

## 2024-09-26 DIAGNOSIS — N18.31 STAGE 3A CHRONIC KIDNEY DISEASE (HCC): ICD-10-CM

## 2024-09-26 DIAGNOSIS — N40.1 BPH WITH URINARY OBSTRUCTION: Primary | Chronic | ICD-10-CM

## 2024-09-26 DIAGNOSIS — M54.50 CHRONIC RIGHT-SIDED LOW BACK PAIN WITHOUT SCIATICA: ICD-10-CM

## 2024-09-26 PROCEDURE — 99214 OFFICE O/P EST MOD 30 MIN: CPT | Performed by: FAMILY MEDICINE

## 2024-09-26 NOTE — ASSESSMENT & PLAN NOTE
Lab Results   Component Value Date    EGFR 54 (L) 02/24/2024    EGFR 59 (L) 05/02/2023    EGFR 56 (L) 12/12/2022    CREATININE 1.50 (H) 02/24/2024    CREATININE 1.40 (H) 05/02/2023    CREATININE 1.45 (H) 12/12/2022

## 2024-09-26 NOTE — ASSESSMENT & PLAN NOTE
Diovan 320  Norvasc 5 mg  Blood pressure is well-controlled  And follows with nephrology at [facility]

## 2024-09-26 NOTE — PROGRESS NOTES
Ambulatory Visit  Name: Clif Li      : 1965      MRN: [id number]  Encounter Provider: Ellie Gagnon MD  Encounter Date: 2024   Encounter department: [facility]    Assessment & Plan  BPH with urinary obstruction  The patient is under care of of  [facility] urology  Pending MRI fusion prostate biopsy with IV sedation on 10/9/2024, Dr. Maradiaga       Chronic right-sided low back pain without sciatica  Low back pain, right more than left.  No sciatica.    X-ray lumbar spine 2024:1.  No acute osseous abnormality.2.  Progression of degenerative change at the level L5-S1 compared to remote x-ray 2009, grossly similar to CT abdomen/pelvis 3/25/2019  I suspect the patient's symptoms are primarily musculoskeletal.  We definitely can consider CT abdomen and pelvis for further evaluation.  Patient prefers to wait for prostate biopsy results prior to making further decision.  His pain has actually improved.         Essential hypertension  Diovan 320  Norvasc 5 mg  Blood pressure is well-controlled  And follows with nephrology at [facility]       Stage 3a chronic kidney disease (HCC)  Lab Results   Component Value Date    EGFR 54 (L) 2024    EGFR 59 (L) 2023    EGFR 56 (L) 2022    CREATININE 1.50 (H) 2024    CREATININE 1.40 (H) 2023    CREATININE 1.45 (H) 2022            Coronary artery calcification  Coronary calcium score 310  Patient was evaluated by [facility] cardiology.  He is reluctant to start atorvastatin due to possible side effects.  I recommend to give atorvastatin a try, patient can start 10 mg 3 days/week and then titrate dose slowly as tolerated.  He is worried about possible effect of statin on his kidneys and I explained him that Rx is being metabolized through his liver.            Patient Instructions   Lets discuss CT pending biopsy results  If IV contrast needed - I will discuss with  ( ?IVF)    History of Present  Illness     Patient presents for follow-up.    Recent extensive evaluation by [facility] and [facility]   Patient is scheduled for transperineal MRI fusion prostate biopsy with Dr. Maradiaga at [facility] on 10/9/2024     prostate MRI at [facility]  1. PI-RADSv2.1 Category 4 - High (clinically significant cancer is likely to be present). Round intermediate T2 signal intensity lesion in the lateral peripheral zone of the prostate mid gland with restricted diffusion signal and abnormal perfusion. By PI-RADS criteria this lesion is of high suspicion for clinically significant prostate cancer. Extruded BPH nodule however is within the differential given its appearance on T2-weighted images. Correlation with MRI ultrasound fusion biopsy could be considered. Otherwise follow-up prostate MRI in 6 months would be recommended.  2. No extraprostatic tumor, seminal vesicle invasion, pelvic lymphadenopathy, or pelvic osseous metastatic disease.   3. Calculated prostate volume of 81 mL.    The patient remains under care of [facility] nephrology, - 11/24, labs  Reports intermittent right lower back/flank discomfort.  No abdominal pain, bloating, dyspepsia, diarrhea or constipation..  Kidney ultrasound was unremarkable aside from medical renal disease.  Patient denies symptoms of dysuria, hematuria, fever, urgency or frequency.  X-ray of lumbar spine reveals degenerative lumbar spine changes.     Recent evaluation by [facility] cardiology.  Total calcium score 310.  LPA< 10.  Cardiology has recommended low-dose statin, patient is reluctant.    He remains on Diovan and amlodipine for hypertension, dose of Diovan was recently increased up to 320 mg daily as per nephrology.          Review of Systems   Constitutional: Negative.    HENT: Negative.     Eyes: Negative.    Respiratory: Negative.     Cardiovascular: Negative.    Gastrointestinal: Negative.    Endocrine: Negative.    Genitourinary:  Positive for flank pain.   Musculoskeletal:   Positive for back pain.   Allergic/Immunologic: Negative.    Neurological: Negative.    Hematological: Negative.    Psychiatric/Behavioral: Negative.       Past Medical History:   Diagnosis Date    Benign prostatic hyperplasia     Chronic kidney disease     CKD    Herpes simplex     RESOLVED [id number]    Hypertension     PONV (postoperative nausea and vomiting)     Renal disorder      Past Surgical History:   Procedure Laterality Date    COLONOSCOPY  12/23/2015    DR GUARDADO    PROSTATE BIOPSY       Family History   Problem Relation Age of Onset    Other Mother         CABG age 70s    Diabetes Mother         type 2    Hypertension Mother     Hypertension Father     Heart attack Father         in his 60s but was big smoker    Hypertension Sister     Hypertension Brother     Celiac disease Brother     Eczema Daughter     No Known Problems Son     Kidney cancer Family     Prostate cancer Family      Social History     Tobacco Use    Smoking status: Never    Smokeless tobacco: Never   Vaping Use    Vaping status: Never Used   Substance and Sexual Activity    Alcohol use: No    Drug use: No    Sexual activity: Yes     Current Outpatient Medications on File Prior to Visit   Medication Sig    amLODIPine (NORVASC) 10 mg tablet TAKE ONE TABLET BY MOUTH EVERY DAY (Patient taking differently: Take 5 mg by mouth daily)    betamethasone dipropionate (DIPROSONE) 0.05 % cream APPLY ONCE TO TWICE A DAY FOR 1 TO 2 WEEKS TO THE ARMS AND LEGS AS NEEDED FOR FLARES    Cholecalciferol (VITAMIN D3) 2000 units TABS Take 1 tablet by mouth daily      Diovan 160 MG tablet Take 1 tablet (160 mg total) by mouth daily (Patient taking differently: Take 320 mg by mouth daily)    Kerendia 20 MG TABS Take 1 tablet by mouth daily    Misc Natural Products (TURMERIC CURCUMIN) CAPS Take 1 capsule by mouth 2 (two) times a day 2400mg twice daily     Multiple Vitamins-Minerals (CENTRUM SILVER 50+MEN PO) Take by mouth    Sodium Fluoride 5000 Sensitive  "1.1-5 % GEL BRUSH DAILY AS DIRECTED    tadalafil (CIALIS) 5 MG tablet Take 1 tablet (5 mg total) by mouth daily    aspirin (ECOTRIN LOW STRENGTH) 81 mg EC tablet Take 1 tablet (81 mg total) by mouth daily (Patient not taking: Reported on 9/26/2024)    atorvastatin (LIPITOR) 10 mg tablet Take 1 tablet (10 mg total) by mouth daily at bedtime (Patient not taking: Reported on 9/26/2024)     Allergies   Allergen Reactions    Nsaids Other (See Comments)     CKD    Moxifloxacin Itching and Rash    Lisinopril Rash    Valsartan Rash     Only generic form cause reaction     Immunization History   Administered Date(s) Administered    COVID-19 MODERNA VACC 0.5 ML IM 03/17/2021, 04/14/2021, 10/29/2021    INFLUENZA 08/20/2016, 10/20/2017    Influenza Quadrivalent Preservative Free 3 years and older IM 10/20/2017    Influenza, seasonal, injectable 08/20/2016     Objective     /80 (BP Location: Left arm, Patient Position: Sitting, Cuff Size: Large)   Pulse 64   Temp 98 °F (36.7 °C) (Temporal)   Resp 16   Ht 5' 7\" (1.702 m)   Wt 89.5 kg (197 lb 6.4 oz)   SpO2 97%   BMI 30.92 kg/m²     Physical Exam  Vitals and nursing note reviewed.   Constitutional:       General: He is not in acute distress.     Appearance: Normal appearance. He is not ill-appearing.   HENT:      Head: Normocephalic and atraumatic.   Eyes:      General: No scleral icterus.     Conjunctiva/sclera: Conjunctivae normal.   Neck:      Vascular: No carotid bruit.   Cardiovascular:      Rate and Rhythm: Normal rate and regular rhythm.   Abdominal:      General: Bowel sounds are normal. There is no distension.      Palpations: Abdomen is soft.      Tenderness: There is no right CVA tenderness or left CVA tenderness.   Musculoskeletal:         General: Normal range of motion.      Cervical back: Neck supple. No rigidity.      Right lower leg: No edema.      Left lower leg: No edema.      Comments: Paraspinal spasm L3-L4 L4-L5, paraspinal tenderness.   Skin:    "  General: Skin is warm.      Findings: No rash.   Neurological:      General: No focal deficit present.      Mental Status: He is alert and oriented to person, place, and time.   Psychiatric:         Mood and Affect: Mood normal.         Behavior: Behavior normal.         Thought Content: Thought content normal.

## 2024-09-26 NOTE — ASSESSMENT & PLAN NOTE
The patient is under care of of [facility] u[facility]  Pending MRI fusion prostate biopsy with IV sedation on 10/9/2024, Dr. Maradiaga

## 2024-09-30 ENCOUNTER — APPOINTMENT (OUTPATIENT)
Dept: PREADMISSION TESTING | Facility: HOSPITAL | Age: 59
End: 2024-09-30
Payer: COMMERCIAL

## 2024-09-30 PROBLEM — G89.29 CHRONIC RIGHT-SIDED LOW BACK PAIN WITHOUT SCIATICA: Status: ACTIVE | Noted: 2024-07-14

## 2024-09-30 NOTE — ASSESSMENT & PLAN NOTE
Low back pain, right more than left.  No sciatica.    X-ray lumbar spine September 2024:1.  No acute osseous abnormality.2.  Progression of degenerative change at the level L5-S1 compared to remote x-ray 8/29/2009, grossly similar to CT abdomen/pelvis 3/25/2019  I suspect the patient's symptoms are primarily musculoskeletal.  We definitely can consider CT abdomen and pelvis for further evaluation.  Patient prefers to wait for prostate biopsy results prior to making further decision.  His pain has actually improved.

## 2024-09-30 NOTE — ASSESSMENT & PLAN NOTE
Coronary calcium score 310  Patient was evaluated by [facility] cardiology.  He is reluctant to start atorvastatin due to possible side effects.  I recommend to give atorvastatin a try, patient can start 10 mg 3 days/week and then titrate dose slowly as tolerated.  He is worried about possible effect of statin on his kidneys and I explained him that Rx is being metabolized through his liver.

## 2024-09-30 NOTE — PRE-PROCEDURE INSTRUCTIONS
Pre-Surgery Instructions:   Medication Instructions    amLODIPine (NORVASC) 10 mg tablet Take night before surgery    Cholecalciferol (VITAMIN D3) 2000 units TABS Hold day of surgery.    Diovan 160 MG tablet Hold day of surgery.    Kerendia 20 MG TABS Instructions provided by MD    Misc Natural Products (TURMERIC CURCUMIN) CAPS Stop taking 7 days prior to surgery.    Multiple Vitamins-Minerals (CENTRUM SILVER 50+MEN PO) Stop taking 7 days prior to surgery.    tadalafil (CIALIS) 5 MG tablet Take night before surgery        Medication instructions for day surgery reviewed. Please use only a sip of water to take your instructed medications. Avoid all over the counter vitamins, supplements and NSAIDS for one week prior to surgery per anesthesia guidelines. Tylenol is ok to take as needed.     You will receive a call one business day prior to surgery with an arrival time and hospital directions. If your surgery is scheduled on a Monday, the hospital will be calling you on the Friday prior to your surgery. If you have not heard from anyone by 8pm, please call the hospital supervisor through the hospital  at [phone]. ([place] [phone] or [place] [phone]).    Do not eat or drink anything after midnight the night before your surgery, including candy, mints, lifesavers, or chewing gum. Do not drink alcohol 24hrs before your surgery. Try not to smoke at least 24hrs before your surgery.       Follow the pre surgery showering instructions as listed in the “My Surgical Experience Booklet” or otherwise provided by your surgeon's office. Do not use a blade to shave the surgical area 1 week before surgery. It is okay to use a clean electric clippers up to 24 hours before surgery. Do not apply any lotions, creams, including makeup, cologne, deodorant, or perfumes after showering on the day of your surgery. Do not use dry shampoo, hair spray, hair gel, or any type of hair products.     No contact lenses, eye  make-up, or artificial eyelashes. Remove nail polish, including gel polish, and any artificial, gel, or acrylic nails if possible. Remove all jewelry including rings and body piercing jewelry.     Wear causal clothing that is easy to take on and off. Consider your type of surgery.    Keep any valuables, jewelry, piercings at home. Please bring any specially ordered equipment (sling, braces) if indicated.    Arrange for a responsible person to drive you to and from the hospital on the day of your surgery. Please confirm the visitor policy for the day of your procedure when you receive your phone call with an arrival time.     Call the surgeon's office with any new illnesses, exposures, or additional questions prior to surgery.    Please reference your “My Surgical Experience Booklet” for additional information to prepare for your upcoming surgery.

## 2024-10-01 ENCOUNTER — ANESTHESIA EVENT (OUTPATIENT)
Dept: PERIOP | Facility: HOSPITAL | Age: 59
End: 2024-10-01
Payer: COMMERCIAL

## 2024-10-08 ENCOUNTER — TELEPHONE (OUTPATIENT)
Dept: UROLOGY | Facility: MEDICAL CENTER | Age: 59
End: 2024-10-08

## 2024-10-08 DIAGNOSIS — R97.20 ELEVATED PROSTATE SPECIFIC ANTIGEN (PSA): Primary | ICD-10-CM

## 2024-10-08 DIAGNOSIS — N39.0 URINARY TRACT INFECTION WITHOUT HEMATURIA, SITE UNSPECIFIED: ICD-10-CM

## 2024-10-08 PROCEDURE — NC001 PR NO CHARGE: Performed by: UROLOGY

## 2024-10-08 RX ORDER — CEFAZOLIN SODIUM 1 G/50ML
1000 SOLUTION INTRAVENOUS EVERY 8 HOURS
Status: CANCELLED | OUTPATIENT
Start: 2024-10-08 | End: 2024-10-09

## 2024-10-08 RX ORDER — ONDANSETRON 2 MG/ML
4 INJECTION INTRAMUSCULAR; INTRAVENOUS EVERY 6 HOURS PRN
Status: CANCELLED | OUTPATIENT
Start: 2024-10-08

## 2024-10-08 RX ORDER — SULFAMETHOXAZOLE/TRIMETHOPRIM 800-160 MG
1 TABLET ORAL EVERY 12 HOURS SCHEDULED
Qty: 6 TABLET | Refills: 0 | Status: SHIPPED | OUTPATIENT
Start: 2024-10-08 | End: 2024-10-11

## 2024-10-08 RX ORDER — OXYBUTYNIN CHLORIDE 5 MG/1
5 TABLET ORAL EVERY 6 HOURS PRN
Status: CANCELLED | OUTPATIENT
Start: 2024-10-08

## 2024-10-08 RX ORDER — SODIUM CHLORIDE, SODIUM LACTATE, POTASSIUM CHLORIDE, CALCIUM CHLORIDE 600; 310; 30; 20 MG/100ML; MG/100ML; MG/100ML; MG/100ML
125 INJECTION, SOLUTION INTRAVENOUS CONTINUOUS
Status: CANCELLED | OUTPATIENT
Start: 2024-10-08

## 2024-10-08 NOTE — TELEPHONE ENCOUNTER
Karen Sepulveda PA-C     Low colony count on urine culture, however due to procedure tomorrow, I have ordered 3 days of antibiotics to be taken today tomorrow and the following day.       Call placed to pt. He did not answer. LMOM in detail with the AP recommendations and antibiotic that has been sent to his pharmacy for pt to take  starting today.     Office number was provided for pt to call with any questions or concerns.

## 2024-10-08 NOTE — TELEPHONE ENCOUNTER
Patient called in asking if he will be given IV antibiotics during the surgery tomorrow. He is concerned that if he takes the antibiotic on an empty stomach it will upset his stomach. Please advise.

## 2024-10-08 NOTE — TELEPHONE ENCOUNTER
Yes he will have IV antibiotics tomorrow. Can take antibiotics after procedure is over. Hold in AM

## 2024-10-08 NOTE — TELEPHONE ENCOUNTER
Call placed to pt. He did not answer. LMOM in detail informing him of the AP recommendations and plan for IV antibiotics.   Office number was provided for pt to call back with any questions or concerns he should have with these instructions

## 2024-10-08 NOTE — H&P
HISTORY AND PHYSICAL  ?  ?  Patient Name: Clif Li  Patient MRN: [id number]  Attending Provider: Rico Maradiaga MD  Service: Urology  Chief Complaint    Elevated PSA, abnormal prostate MRI    HPI   Clif Li is a 58 y.o. male with rising PSA currently 5.7    MRI shows category 4 lesion.  81 mL prostate.  Biopsy in 2017 was negative.  I plan transperineal ultrasound-guided prostate fusion biopsy.  Potential risks and complications discussed, and informed consent was given by the patient.    Follow-up will be with Dr. Maradiaga  Medications  Meds/Allergies   No current facility-administered medications for this encounter.      Cannot display prior to admission medications because the patient has not been admitted in this contact.     No current facility-administered medications for this encounter.    Current Outpatient Medications:     amLODIPine (NORVASC) 10 mg tablet, TAKE ONE TABLET BY MOUTH EVERY DAY (Patient taking differently: Take 5 mg by mouth daily), Disp: 90 tablet, Rfl: 3    Cholecalciferol (VITAMIN D3) 2000 units TABS, Take 1 tablet by mouth daily  , Disp: , Rfl:     Diovan 160 MG tablet, Take 1 tablet (160 mg total) by mouth daily (Patient taking differently: Take 320 mg by mouth daily), Disp: 90 tablet, Rfl: 3    Kerendia 20 MG TABS, Take 1 tablet by mouth daily, Disp: , Rfl:     Misc Natural Products (TURMERIC CURCUMIN) CAPS, Take 1 capsule by mouth 2 (two) times a day 2400mg twice daily , Disp: , Rfl:     Multiple Vitamins-Minerals (CENTRUM SILVER 50+MEN PO), Take by mouth, Disp: , Rfl:     tadalafil (CIALIS) 5 MG tablet, Take 1 tablet (5 mg total) by mouth daily, Disp: 90 tablet, Rfl: 3    aspirin (ECOTRIN LOW STRENGTH) 81 mg EC tablet, Take 1 tablet (81 mg total) by mouth daily (Patient not taking: Reported on 9/26/2024), Disp: , Rfl:     atorvastatin (LIPITOR) 10 mg tablet, Take 1 tablet (10 mg total) by mouth daily at bedtime (Patient not taking: Reported on 9/26/2024), Disp: 90 tablet, Rfl:  3    betamethasone dipropionate (DIPROSONE) 0.05 % cream, APPLY ONCE TO TWICE A DAY FOR 1 TO 2 WEEKS TO THE ARMS AND LEGS AS NEEDED FOR FLARES, Disp: , Rfl:     Sodium Fluoride 5000 Sensitive 1.1-5 % GEL, BRUSH DAILY AS DIRECTED, Disp: , Rfl:     sulfamethoxazole-trimethoprim (BACTRIM DS) 800-160 mg per tablet, Take 1 tablet by mouth every 12 (twelve) hours for 3 days, Disp: 6 tablet, Rfl: 0  Review of Systems  10 point review of systems negative except as noted in HPI  Allergies  Allergies   Allergen Reactions    Nsaids Other (See Comments)     CKD    Moxifloxacin Itching and Rash    Lisinopril Rash    Valsartan Rash     Only generic form cause reaction     PMH  Past Medical History:   Diagnosis Date    Benign prostatic hyperplasia     Chronic kidney disease     CKD    Herpes simplex     RESOLVED [id number]    Hypertension     PONV (postoperative nausea and vomiting)     Renal disorder      Past surgical history  Past Surgical History:   Procedure Laterality Date    COLONOSCOPY  12/23/2015    DR GUARDADO    PROSTATE BIOPSY       Social history  Social History     Tobacco Use    Smoking status: Never    Smokeless tobacco: Never   Vaping Use    Vaping status: Never Used   Substance Use Topics    Alcohol use: No    Drug use: No     ?  Physical Exam    .vs  General appearance: alert and oriented, in no acute distress and alert  Head: Normocephalic, without obvious abnormality, atraumatic  Throat: lips, mucosa, and tongue normal; teeth and gums normal  Neck: no adenopathy, no carotid bruit, no JVD, supple, symmetrical, trachea midline, and thyroid not enlarged, symmetric, no tenderness/mass/nodules  Lungs: clear to auscultation bilaterally  Heart: regular rate and rhythm, S1, S2 normal, no murmur, click, rub or gallop  Abdomen: soft, non-tender; bowel sounds normal; no masses,  no organomegaly  Extremities: extremities normal, warm and well-perfused; no cyanosis, clubbing, or edema  Rico Maradiaga MD

## 2024-10-09 ENCOUNTER — ANESTHESIA (OUTPATIENT)
Dept: PERIOP | Facility: HOSPITAL | Age: 59
End: 2024-10-09
Payer: COMMERCIAL

## 2024-10-09 ENCOUNTER — HOSPITAL ENCOUNTER (OUTPATIENT)
Facility: HOSPITAL | Age: 59
Setting detail: OUTPATIENT SURGERY
Discharge: HOME | End: 2024-10-09
Attending: UROLOGY | Admitting: UROLOGY
Payer: COMMERCIAL

## 2024-10-09 ENCOUNTER — NURSE TRIAGE (OUTPATIENT)
Dept: OTHER | Facility: OTHER | Age: 59
End: 2024-10-09

## 2024-10-09 VITALS
TEMPERATURE: 97.4 F | DIASTOLIC BLOOD PRESSURE: 77 MMHG | WEIGHT: 162.26 LBS | OXYGEN SATURATION: 97 % | HEART RATE: 70 BPM | BODY MASS INDEX: 25.47 KG/M2 | RESPIRATION RATE: 16 BRPM | SYSTOLIC BLOOD PRESSURE: 142 MMHG | HEIGHT: 67 IN

## 2024-10-09 DIAGNOSIS — N13.8 BPH WITH URINARY OBSTRUCTION: Primary | ICD-10-CM

## 2024-10-09 DIAGNOSIS — R97.20 ELEVATED PROSTATE SPECIFIC ANTIGEN (PSA): Primary | ICD-10-CM

## 2024-10-09 DIAGNOSIS — N32.89 BLADDER SPASM: Primary | ICD-10-CM

## 2024-10-09 DIAGNOSIS — N40.1 BPH WITH URINARY OBSTRUCTION: Primary | ICD-10-CM

## 2024-10-09 PROBLEM — Z98.890 PONV (POSTOPERATIVE NAUSEA AND VOMITING): Status: ACTIVE | Noted: 2024-10-09

## 2024-10-09 PROBLEM — M79.18 MYOFASCIAL PAIN SYNDROME: Status: RESOLVED | Noted: 2018-01-21 | Resolved: 2024-10-09

## 2024-10-09 PROBLEM — R11.2 PONV (POSTOPERATIVE NAUSEA AND VOMITING): Status: ACTIVE | Noted: 2024-10-09

## 2024-10-09 PROCEDURE — 99024 POSTOP FOLLOW-UP VISIT: CPT | Performed by: UROLOGY

## 2024-10-09 PROCEDURE — 76942 ECHO GUIDE FOR BIOPSY: CPT | Performed by: UROLOGY

## 2024-10-09 PROCEDURE — G0416 PROSTATE BIOPSY, ANY MTHD: HCPCS | Performed by: PATHOLOGY

## 2024-10-09 PROCEDURE — 55700 PR PROSTATE NEEDLE BIOPSY ANY APPROACH: CPT | Performed by: UROLOGY

## 2024-10-09 RX ORDER — TAMSULOSIN HYDROCHLORIDE 0.4 MG/1
CAPSULE ORAL
Qty: 30 CAPSULE | Refills: 3 | Status: SHIPPED | OUTPATIENT
Start: 2024-10-09

## 2024-10-09 RX ORDER — OXYCODONE AND ACETAMINOPHEN 5; 325 MG/1; MG/1
1 TABLET ORAL EVERY 4 HOURS PRN
Status: DISCONTINUED | OUTPATIENT
Start: 2024-10-09 | End: 2024-10-09 | Stop reason: HOSPADM

## 2024-10-09 RX ORDER — GLYCOPYRROLATE 0.2 MG/ML
INJECTION INTRAMUSCULAR; INTRAVENOUS AS NEEDED
Status: DISCONTINUED | OUTPATIENT
Start: 2024-10-09 | End: 2024-10-09

## 2024-10-09 RX ORDER — PROPOFOL 10 MG/ML
INJECTION, EMULSION INTRAVENOUS AS NEEDED
Status: DISCONTINUED | OUTPATIENT
Start: 2024-10-09 | End: 2024-10-09

## 2024-10-09 RX ORDER — OXYBUTYNIN CHLORIDE 5 MG/1
5 TABLET ORAL 3 TIMES DAILY PRN
Qty: 20 TABLET | Refills: 0 | Status: SHIPPED | OUTPATIENT
Start: 2024-10-09 | End: 2024-10-18 | Stop reason: ALTCHOICE

## 2024-10-09 RX ORDER — ONDANSETRON 2 MG/ML
4 INJECTION INTRAMUSCULAR; INTRAVENOUS EVERY 6 HOURS PRN
Status: DISCONTINUED | OUTPATIENT
Start: 2024-10-09 | End: 2024-10-09 | Stop reason: HOSPADM

## 2024-10-09 RX ORDER — SODIUM CHLORIDE 9 MG/ML
125 INJECTION, SOLUTION INTRAVENOUS CONTINUOUS
Status: DISCONTINUED | OUTPATIENT
Start: 2024-10-09 | End: 2024-10-09 | Stop reason: HOSPADM

## 2024-10-09 RX ORDER — FUROSEMIDE 10 MG/ML
INJECTION INTRAMUSCULAR; INTRAVENOUS AS NEEDED
Status: DISCONTINUED | OUTPATIENT
Start: 2024-10-09 | End: 2024-10-09

## 2024-10-09 RX ORDER — MIDAZOLAM HYDROCHLORIDE 2 MG/2ML
INJECTION, SOLUTION INTRAMUSCULAR; INTRAVENOUS AS NEEDED
Status: DISCONTINUED | OUTPATIENT
Start: 2024-10-09 | End: 2024-10-09

## 2024-10-09 RX ORDER — CEFAZOLIN SODIUM 2 G/50ML
2000 SOLUTION INTRAVENOUS ONCE
Status: COMPLETED | OUTPATIENT
Start: 2024-10-09 | End: 2024-10-09

## 2024-10-09 RX ORDER — PROPOFOL 10 MG/ML
INJECTION, EMULSION INTRAVENOUS CONTINUOUS PRN
Status: DISCONTINUED | OUTPATIENT
Start: 2024-10-09 | End: 2024-10-09

## 2024-10-09 RX ORDER — EPHEDRINE SULFATE 50 MG/ML
INJECTION INTRAVENOUS AS NEEDED
Status: DISCONTINUED | OUTPATIENT
Start: 2024-10-09 | End: 2024-10-09

## 2024-10-09 RX ORDER — FENTANYL CITRATE 50 UG/ML
50 INJECTION, SOLUTION INTRAMUSCULAR; INTRAVENOUS
Status: DISCONTINUED | OUTPATIENT
Start: 2024-10-09 | End: 2024-10-09 | Stop reason: HOSPADM

## 2024-10-09 RX ORDER — HYDROCODONE BITARTRATE AND ACETAMINOPHEN 5; 325 MG/1; MG/1
1 TABLET ORAL EVERY 6 HOURS PRN
Qty: 4 TABLET | Refills: 0 | Status: SHIPPED | OUTPATIENT
Start: 2024-10-09 | End: 2024-10-18 | Stop reason: ALTCHOICE

## 2024-10-09 RX ORDER — FENTANYL CITRATE 50 UG/ML
INJECTION, SOLUTION INTRAMUSCULAR; INTRAVENOUS AS NEEDED
Status: DISCONTINUED | OUTPATIENT
Start: 2024-10-09 | End: 2024-10-09

## 2024-10-09 RX ADMIN — SODIUM CHLORIDE 125 ML/HR: 0.9 INJECTION, SOLUTION INTRAVENOUS at 08:11

## 2024-10-09 RX ADMIN — MIDAZOLAM 2 MG: 1 INJECTION INTRAMUSCULAR; INTRAVENOUS at 09:54

## 2024-10-09 RX ADMIN — PROPOFOL 100 MCG/KG/MIN: 10 INJECTION, EMULSION INTRAVENOUS at 09:59

## 2024-10-09 RX ADMIN — GLYCOPYRROLATE 0.2 MG: 0.2 INJECTION, SOLUTION INTRAMUSCULAR; INTRAVENOUS at 09:57

## 2024-10-09 RX ADMIN — FUROSEMIDE 10 MG: 10 INJECTION, SOLUTION INTRAVENOUS at 10:18

## 2024-10-09 RX ADMIN — OXYCODONE HYDROCHLORIDE AND ACETAMINOPHEN 1 TABLET: 5; 325 TABLET ORAL at 13:11

## 2024-10-09 RX ADMIN — PROPOFOL 40 MG: 10 INJECTION, EMULSION INTRAVENOUS at 09:59

## 2024-10-09 RX ADMIN — EPHEDRINE SULFATE 5 MG: 50 INJECTION INTRAVENOUS at 10:19

## 2024-10-09 RX ADMIN — FENTANYL CITRATE 100 MCG: 50 INJECTION INTRAMUSCULAR; INTRAVENOUS at 09:54

## 2024-10-09 RX ADMIN — CEFAZOLIN SODIUM 2000 MG: 2 SOLUTION INTRAVENOUS at 09:57

## 2024-10-09 NOTE — OP NOTE
OPERATIVE REPORT  PATIENT NAME: Clif Li    :  1965  MRN: [id number]  Pt Location: [facility] OR ROOM 03    SURGERY DATE: 10/9/2024    Surgeons and Role:     * Rico Maradiaga MD - Primary    Preop Diagnosis:  Elevated prostate specific antigen (PSA) [R97.20]    Post-Op Diagnosis Codes:     * Elevated prostate specific antigen (PSA) [R97.20]    Procedure(s):  TRANSPERINEAL MRI FUSION BX PROSTATE    Specimen(s):  ID Type Source Tests Collected by Time Destination   1 : RIGHT ANTERIOR MEDIAL Tissue Prostate TISSUE EXAM Rico Maradiaga MD 10/9/2024  9:48 AM    2 : RIGHT ANTERIOR LATERAL Tissue Prostate TISSUE EXAM Rico Maradiaga MD 10/9/2024 10:05 AM    3 : RIGHT POSTERIOR LATERAL Tissue Prostate TISSUE EXAM Rico Maradiaga MD 10/9/2024  9:48 AM    4 : LEFT ANTERIOR MEDIAL Tissue Prostate TISSUE EXAM Rico Maradiaga MD 10/9/2024 10:05 AM    5 : LEFT ANTERIOR LATERAL Tissue Prostate TISSUE EXAM Rico Maradiaga MD 10/9/2024 10:05 AM    6 : LEFT POSTERIOR LATERAL Tissue Prostate TISSUE EXAM Rico Maradiaga MD 10/9/2024 10:06 AM    7 : LEFT POSTERIOR MEDIAL Tissue Prostate TISSUE EXAM Rico Maradiaga MD 10/9/2024 10:06 AM    8 : LEFT BASE Tissue Prostate TISSUE EXAM Rico Maradiaga MD 10/9/2024 10:06 AM    9 : RIGHT POSTERIOR MEDIAL Tissue Prostate TISSUE EXAM Rico Maradiaga MD 10/9/2024 10:06 AM    10 : RIGHT BASE Tissue Prostate TISSUE EXAM Rico Maradiaga MD 10/9/2024 10:07 AM    11 : LIZY- Lateral peripheral Tissue Prostate TISSUE EXAM Rico Maradiaga MD 10/9/2024 10:07 AM        Estimated Blood Loss:   Minimal    Drains:  * No LDAs found *    Anesthesia Type:   IV Sedation with Anesthesia    Operative Indications:  Elevated prostate specific antigen (PSA) [R97.20]      Operative Findings:  Biopsies taken      Complications:   None    Procedure and Technique:      After the patient was placed under adequate sedation anesthesia, he was prepped in exaggerated lithotomy position using ChloraPrep on the perineal skin and perianal region.      The transrectal ultrasound probe was placed.  The prostate was examined under ultrasound, and ultrasound was used to guide the placement of the 22-gauge spinal needle for the local anesthesia.  20 mL of a mixture of 1% lidocaine and 1/2% Marcaine was instilled on the skin, perineum, and in the appropriate regions in the right side and left side of the neurovascular bundles.     The transrectal ultrasound probe was placed, and the precision point needle system was used.     Using the 18-gauge tissue biopsy needle, 2 samples were taken from each of the appropriate zones of the prostate, as listed above.  Particular attention was paid to the region of interest, which had 5 samples taken.     After all samples were taken, the needle and rectal probe were removed.  Pressure was held on the perineum for 5 minutes, and a bandage was placed over the needle puncture sites in the perineum.  There was no bleeding at this point.     Patient tolerated the procedure well and was taken to recovery in good condition.   I was present for the entire procedure.    Patient Disposition:  PACU              SIGNATURE: Rico Maradiaga MD  DATE: October 9, 2024  TIME: 10:28 AM

## 2024-10-09 NOTE — TELEPHONE ENCOUNTER
"Reason for Disposition  • [1] Caller has URGENT question AND [2] triager unable to answer question    Answer Assessment - Initial Assessment Questions  1. SYMPTOM: \"What's the main symptom you're concerned about?\" (e.g., pain, fever, vomiting)      Urine leakage at insertion of catheter into penis, leaks more with bladder spasms, approximately 1 to 2 tablespoons.    2. ONSET: \"When did this start?\"      30 minutes ago    3. SURGERY: \"What surgery did you have?\"      Transperineal MRI fusion biopsy prostate    4. DATE of SURGERY: \"When was the surgery?\"       Today     5. ANESTHESIA: \"What type of anesthesia did you have?\" (e.g., general, spinal, epidural, local)      IV sedation    6. DRAINS: \"Were any drains place in or around the wound?\" (e.g., Hemovac, Hossein-Garrison, Penrose)      N/A    7. PAIN: \"Is there any pain?\" If Yes, ask: \"How bad is it?\"  (Scale 1-10; or mild, moderate, severe)      Moderate- pressure when sitting    8. FEVER: \"Do you have a fever?\" If Yes, ask: \"What is your temperature, how was it measured, and when did it start?\"      Denies    9. VOMITING: \"Is there any vomiting?\" If Yes, ask: \"How many times?\"      Vomited 1 time soon after he got home, thinks it was due to the Percocet that was given in the hospital but did not take any additional Percocet at home.  Did not take prescribed Flomax yet.    10. BLEEDING: \"Is there any bleeding?\" If Yes, ask: \"How much?\" and \"Where?\"        Bleeding earlier at tip of penis which has subsided    11. OTHER SYMPTOMS: \"Do you have any other symptoms?\" (e.g., drainage from wound, painful urination, constipation)        Burning at tip of penis    Took 2 extra strength tylenol.    Protocols used: Post-Op Symptoms and Questions-Adult-    "

## 2024-10-09 NOTE — DISCHARGE SUMMARY
Discharge Summary - Clif Li 58 y.o. male MRN: [id number]    Admission Date: 10/9/2024     Admitting Diagnosis: Elevated prostate specific antigen (PSA) [R97.20]    Procedures Performed: Transperineal prostate biopsy    Patient underwent planned outpt surgery and recovered without complication. Discharged in good condition.  Medications were prescribed.  Pt knows to have office follow-up at the appropriate time.

## 2024-10-09 NOTE — TELEPHONE ENCOUNTER
Contacted on-call provider who recommends Vaseline around the tip of the penis, make sure catheter is not being tugged, and that she will send oxybutynin as needed for bladder spasms (but to stop it 3 days prior to Thompson removal or void trial).  Also encourage hydration.    Contacted patient to make him aware of on-call provider's recommendations.  He verified that he would like the prescription sent to the [facility] pharmacy in his chart.  Reviewed callback precautions with him.  He verbalized understanding and was appreciative.

## 2024-10-09 NOTE — ANESTHESIA PREPROCEDURE EVALUATION
Procedure:  TRANSPERINEAL MRI FUSION BX PROSTATE (Perineum)    Relevant Problems   ANESTHESIA   (+) PONV (postoperative nausea and vomiting)      CARDIO  Left Ventricle Left ventricular cavity size is normal. Wall thickness is normal. The left ventricular ejection fraction is 65%. Systolic function is vigorous. Wall motion is normal. Diastolic function is normal.  Right Ventricle Right ventricular cavity size is normal. Systolic function is normal.  Left Atrium The atrium is normal in size (16-34 mL/m2).  Right Atrium The atrium is normal in size.  Aortic Valve The aortic valve is trileaflet. The leaflets are not thickened. There is no evidence of regurgitation. The aortic valve has no significant stenosis.  Mitral Valve There is mild thickening.  There is no evidence of regurgitation. There is no evidence of stenosis.  Tricuspid Valve Tricuspid valve structure is normal. There is trace regurgitation. There is no evidence of stenosis. The estimated right ventricular systolic pressure is 14.00 mmHg.  Pulmonic Valve Pulmonic valve structure is normal. There is trace regurgitation. There is no evidence of stenosis.  Ascending Aorta The aortic root is normal in size. The sinus of Valsalva is normal in size. The ascending aorta is mildly dilated. The aortic root is 3.20 cm. The ascending aorta is 3.9 cm and 1.9 cm/m2 when indexed for BSA.  IVC/SVC The inferior vena cava size is 1.0 mm. The right atrial pressure is estimated at 3.0 mmHg. The inferior vena cava is normal in size. Respirophasic changes were normal.         (+) Ascending aorta dilatation (HCC)   (+) Essential hypertension      /RENAL   (+) Angiomyolipoma of kidney   (+) BPH with urinary obstruction   (+) Stage 3a chronic kidney disease (HCC)      HEMATOLOGY   (+) Thrombocytopenia (HCC)      MUSCULOSKELETAL   (+) Chronic right-sided low back pain without sciatica   (+) Impingement syndrome of left shoulder   (+) Impingement syndrome of right shoulder   (+)  Myofascial pain syndrome (Resolved)      NEURO/PSYCH   (+) Chronic right-sided low back pain without sciatica   (+) Myofascial pain syndrome (Resolved)      PULMONARY (within normal limits)        Physical Exam    Airway    Mallampati score: II         Dental       Cardiovascular  Cardiovascular exam normal    Pulmonary  Pulmonary exam normal Breath sounds clear to auscultation    Other Findings        Anesthesia Plan  ASA Score- 2     Anesthesia Type- IV sedation with anesthesia with ASA Monitors.         Additional Monitors:     Airway Plan:            Plan Factors-    Chart reviewed.   Existing labs reviewed. Patient summary reviewed.    Patient is not a current smoker.              Induction- intravenous.    Postoperative Plan-         Informed Consent- Anesthetic plan and risks discussed with patient.

## 2024-10-09 NOTE — DISCHARGE INSTR - AVS FIRST PAGE
All your prior medicines are the same.    New medications: Hydrocodone if needed for pain    There is a Band-Aid on the skin behind the scrotum, that area is called the perineum.  You can remove that late afternoon today.  You will see some spotting of blood and bruising on the perineum.  The bruising can extend several inches out, and sometimes gets very dark and lasts for a week or longer.  It is harmless and will go away.           You might see some blood in the urine for 3 to 7 days    If you have any sexual activity, you can see blood in the semen during ejaculation for up to 2 full months.  It is not harmful at all and it just goes away over time.    Keep your activities reduced tomorrow.  Resume all your full activities the day after tomorrow.  No restrictions.    Your follow-up appointment will be with Dr. Maradiaga.  He will be the one to discuss results with you, and for any questions.

## 2024-10-09 NOTE — ANESTHESIA POSTPROCEDURE EVALUATION
Post-Op Assessment Note    CV Status:  Stable  Pain Score: 0    Pain management: adequate       Mental Status:  Sleepy   Hydration Status:  Stable   PONV Controlled:  None   Airway Patency:  Patent and adequate     Post Op Vitals Reviewed: Yes    No anethesia notable event occurred.    Staff: CRNA           Last Filed PACU Vitals:  Vitals Value Taken Time   Temp98     Pulse 74     BP 93/52     Resp 12     SpO2 98         Modified Elpidio:  No data recorded

## 2024-10-09 NOTE — ANESTHESIA POSTPROCEDURE EVALUATION
Post-Op Assessment Note    Last Filed PACU Vitals:  Vitals Value Taken Time   Temp 98.4 °F (36.9 °C) 10/09/24 1047   Pulse 78 10/09/24 1050   BP 98/50 10/09/24 1047   Resp 16 10/09/24 1050   SpO2 96 % 10/09/24 1050   Vitals shown include unfiled device data.    Modified Elpidio:  Activity: 2 (10/9/2024 10:47 AM)  Respiration: 2 (10/9/2024 10:47 AM)  Circulation: 2 (10/9/2024 10:47 AM)  Consciousness: 2 (10/9/2024 10:47 AM)  Oxygen Saturation: 2 (10/9/2024 10:47 AM)  Modified Elpidio Score: 10 (10/9/2024 10:47 AM)

## 2024-10-10 ENCOUNTER — TELEPHONE (OUTPATIENT)
Dept: UROLOGY | Facility: MEDICAL CENTER | Age: 59
End: 2024-10-10

## 2024-10-10 PROCEDURE — G0416 PROSTATE BIOPSY, ANY MTHD: HCPCS | Performed by: PATHOLOGY

## 2024-10-10 NOTE — TELEPHONE ENCOUNTER
Called pt and left message on VM to call office back.    Pt to be scheduled for addison removal/void trial tomorrow (10/11/24) with nurse in [place] office.  He should avoid taking oxybutinin that was prescribed for bladder spasms as it may interfere with void trial.

## 2024-10-10 NOTE — TELEPHONE ENCOUNTER
Spoke to pt.  He is scheduled tomorrow for VT.  He was advised not to take oxybutinin today if possible.

## 2024-10-11 ENCOUNTER — TELEPHONE (OUTPATIENT)
Dept: UROLOGY | Facility: MEDICAL CENTER | Age: 59
End: 2024-10-11

## 2024-10-11 ENCOUNTER — PROCEDURE VISIT (OUTPATIENT)
Dept: UROLOGY | Facility: MEDICAL CENTER | Age: 59
End: 2024-10-11
Payer: COMMERCIAL

## 2024-10-11 VITALS
DIASTOLIC BLOOD PRESSURE: 78 MMHG | BODY MASS INDEX: 30.29 KG/M2 | SYSTOLIC BLOOD PRESSURE: 142 MMHG | HEART RATE: 94 BPM | WEIGHT: 193 LBS | HEIGHT: 67 IN

## 2024-10-11 DIAGNOSIS — N99.89 POSTPROCEDURAL URINARY RETENTION: Primary | ICD-10-CM

## 2024-10-11 DIAGNOSIS — R33.8 POSTPROCEDURAL URINARY RETENTION: Primary | ICD-10-CM

## 2024-10-11 LAB — POST-VOID RESIDUAL VOLUME, ML POC: 70 ML

## 2024-10-11 PROCEDURE — 51798 US URINE CAPACITY MEASURE: CPT

## 2024-10-11 PROCEDURE — 99024 POSTOP FOLLOW-UP VISIT: CPT

## 2024-10-11 NOTE — PROGRESS NOTES
"10/11/2024    Clif Li  1965  [id number]    Diagnosis  Chief Complaint    Postprocedural urinary retention         Patient presents for addison removal/void trial s/p post procedural urinary retention from fusion biopsy on 10/9/24 managed by Dr. Maradiaga    Plan  OV scheduled for 10/18/24 for path review    Procedure Addison removal/voiding trial    Addison catheter removed after deflation of an intact balloon. Patient tolerated well. Encouraged patient to hydrate well and return this afternoon for post void residual.  he knows he may return early if uncomfortable and unable to urinate. Patient agrees to this plan.    Patient returned this afternoon. Pt voided in the office.  PVR performed measuring 70 mls. No addison needed.    Pt knows to contact the office with any issues or concerns.    Vitals:    10/11/24 0910   BP: 142/78   Pulse: 94   Weight: 87.5 kg (193 lb)   Height: 5' 7\" (1.702 m)           Lisbeth London RN       "

## 2024-10-18 ENCOUNTER — OFFICE VISIT (OUTPATIENT)
Dept: UROLOGY | Facility: MEDICAL CENTER | Age: 59
End: 2024-10-18
Payer: COMMERCIAL

## 2024-10-18 VITALS
BODY MASS INDEX: 30.29 KG/M2 | SYSTOLIC BLOOD PRESSURE: 118 MMHG | HEART RATE: 66 BPM | OXYGEN SATURATION: 98 % | DIASTOLIC BLOOD PRESSURE: 80 MMHG | WEIGHT: 193 LBS | HEIGHT: 67 IN

## 2024-10-18 DIAGNOSIS — R97.20 ELEVATED PROSTATE SPECIFIC ANTIGEN (PSA): ICD-10-CM

## 2024-10-18 DIAGNOSIS — N40.1 BPH WITH URINARY OBSTRUCTION: ICD-10-CM

## 2024-10-18 DIAGNOSIS — N13.8 BPH WITH URINARY OBSTRUCTION: ICD-10-CM

## 2024-10-18 DIAGNOSIS — R33.8 POSTPROCEDURAL URINARY RETENTION: Primary | ICD-10-CM

## 2024-10-18 DIAGNOSIS — N99.89 POSTPROCEDURAL URINARY RETENTION: Primary | ICD-10-CM

## 2024-10-18 LAB — POST-VOID RESIDUAL VOLUME, ML POC: 58 ML

## 2024-10-18 PROCEDURE — 99213 OFFICE O/P EST LOW 20 MIN: CPT | Performed by: UROLOGY

## 2024-10-18 PROCEDURE — 51798 US URINE CAPACITY MEASURE: CPT | Performed by: UROLOGY

## 2024-10-18 NOTE — PROGRESS NOTES
"   HISTORY:    Fusion prostate biopsy 2 weeks ago.  Benign with no suggestive any cancer.    Developed retention afterwards For 500 mL.    After Thompson removal several days later, he is voiding back to his usual baseline, PVR only 58 mL today.    We started tamsulosin on the day of the surgery because of the retention.  He asks if she should take 2/day           ASSESSMENT / PLAN:    We will stay at 1 tamsulosin per day at present    This is a second biopsy over several years    Follow-up 1 year with PSA, sooner if any problems with voiding    The following portions of the patient's history were reviewed and updated as appropriate: allergies, current medications, past family history, past medical history, past social history, past surgical history, and problem list.    Review of Systems      Objective:     Physical Exam      0   Lab Value Date/Time    PSA 6.22 (H) 07/13/2024 0955    PSA 4.4 (H) 03/29/2019 1220   ]  BUN   Date Value Ref Range Status   02/24/2024 19 7 - 25 mg/dL Final   05/02/2023 24 (H) 8 - 20 mg/dL Final     Creatinine   Date Value Ref Range Status   02/24/2024 1.50 (H) 0.70 - 1.30 mg/dL Final   05/02/2023 1.40 (H) 0.64 - 1.27 mg/dL Final     No components found for: \"CBC\"      Patient Active Problem List   Diagnosis    Essential hypertension    BPH with urinary obstruction    Elevated prostate specific antigen (PSA)    Angiomyolipoma of kidney    Mixed erectile dysfunction    Impingement syndrome of right shoulder    Thrombocytopenia (HCC)    Urticaria    Hemorrhoids, external, thrombosed    Impingement syndrome of left shoulder    Subacromial bursitis of right shoulder joint    Papilloma of palate    Benign paroxysmal positional vertigo    Congenital foot deformity    Family history of heart disease    Coronary artery calcification    Stage 3a chronic kidney disease (HCC)    Ascending aorta dilatation (HCC)    Chronic right-sided low back pain without sciatica    PONV (postoperative nausea and " vomiting)        Diagnoses and all orders for this visit:    Postprocedural urinary retention  -     POCT Measure PVR    BPH with urinary obstruction    Elevated prostate specific antigen (PSA)  -     PSA Total, Diagnostic; Future           Patient ID: Clif Li is a 58 y.o. male.      Current Outpatient Medications:     amLODIPine (NORVASC) 10 mg tablet, TAKE ONE TABLET BY MOUTH EVERY DAY (Patient taking differently: Take 5 mg by mouth daily), Disp: 90 tablet, Rfl: 3    betamethasone dipropionate (DIPROSONE) 0.05 % cream, APPLY ONCE TO TWICE A DAY FOR 1 TO 2 WEEKS TO THE ARMS AND LEGS AS NEEDED FOR FLARES, Disp: , Rfl:     Cholecalciferol (VITAMIN D3) 2000 units TABS, Take 1 tablet by mouth daily  , Disp: , Rfl:     Diovan 160 MG tablet, Take 1 tablet (160 mg total) by mouth daily (Patient taking differently: Take 320 mg by mouth daily), Disp: 90 tablet, Rfl: 3    Kerendia 20 MG TABS, Take 1 tablet by mouth daily, Disp: , Rfl:     Misc Natural Products (TURMERIC CURCUMIN) CAPS, Take 1 capsule by mouth 2 (two) times a day 2400mg twice daily , Disp: , Rfl:     Multiple Vitamins-Minerals (CENTRUM SILVER 50+MEN PO), Take by mouth, Disp: , Rfl:     Sodium Fluoride 5000 Sensitive 1.1-5 % GEL, BRUSH DAILY AS DIRECTED, Disp: , Rfl:     tadalafil (CIALIS) 5 MG tablet, Take 1 tablet (5 mg total) by mouth daily, Disp: 90 tablet, Rfl: 3    tamsulosin (FLOMAX) 0.4 mg, Once a day right after supper, Disp: 30 capsule, Rfl: 3    aspirin (ECOTRIN LOW STRENGTH) 81 mg EC tablet, Take 1 tablet (81 mg total) by mouth daily, Disp: , Rfl:     atorvastatin (LIPITOR) 10 mg tablet, Take 1 tablet (10 mg total) by mouth daily at bedtime, Disp: 90 tablet, Rfl: 3    HYDROcodone-acetaminophen (Norco) 5-325 mg per tablet, Take 1 tablet by mouth every 6 (six) hours as needed for pain for up to 10 days Max Daily Amount: 4 tablets, Disp: 4 tablet, Rfl: 0    oxybutynin (DITROPAN) 5 mg tablet, Take 1 tablet (5 mg total) by mouth 3 (three) times a  day as needed (for bladder spasms), Disp: 20 tablet, Rfl: 0    Past Medical History:   Diagnosis Date    Benign prostatic hyperplasia     Chronic kidney disease     CKD    Herpes simplex     RESOLVED [id number]    Hypertension     PONV (postoperative nausea and vomiting)     Renal disorder        Past Surgical History:   Procedure Laterality Date    COLONOSCOPY  12/23/2015    DR GUARDADO    MO PROSTATE NEEDLE BIOPSY ANY APPROACH N/A 10/9/2024    Procedure: TRANSPERINEAL MRI FUSION BX PROSTATE;  Surgeon: Rico Maradiaga MD;  Location: [facility];  Service: Urology    PROSTATE BIOPSY         Social History

## 2024-12-13 ENCOUNTER — TELEPHONE (OUTPATIENT)
Dept: CARDIOLOGY CLINIC | Facility: CLINIC | Age: 59
End: 2024-12-13

## 2024-12-13 NOTE — TELEPHONE ENCOUNTER
LMOM asking patient to come in at 840AM for his 12/23/24 appointment. Left [phone] to callback and confirm.

## 2024-12-19 ENCOUNTER — OFFICE VISIT (OUTPATIENT)
Dept: FAMILY MEDICINE CLINIC | Facility: CLINIC | Age: 59
End: 2024-12-19
Payer: COMMERCIAL

## 2024-12-19 VITALS
RESPIRATION RATE: 18 BRPM | HEART RATE: 66 BPM | WEIGHT: 199.13 LBS | SYSTOLIC BLOOD PRESSURE: 120 MMHG | BODY MASS INDEX: 31.25 KG/M2 | DIASTOLIC BLOOD PRESSURE: 70 MMHG | HEIGHT: 67 IN | OXYGEN SATURATION: 98 % | TEMPERATURE: 97.8 F

## 2024-12-19 DIAGNOSIS — M54.50 CHRONIC RIGHT-SIDED LOW BACK PAIN WITHOUT SCIATICA: Primary | ICD-10-CM

## 2024-12-19 DIAGNOSIS — G89.29 CHRONIC RIGHT-SIDED LOW BACK PAIN WITHOUT SCIATICA: Primary | ICD-10-CM

## 2024-12-19 PROCEDURE — 99213 OFFICE O/P EST LOW 20 MIN: CPT | Performed by: FAMILY MEDICINE

## 2024-12-19 RX ORDER — METHYLPREDNISOLONE 4 MG/1
TABLET ORAL
Qty: 21 EACH | Refills: 0 | Status: SHIPPED | OUTPATIENT
Start: 2024-12-19
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
## 2024-12-20 NOTE — ASSESSMENT & PLAN NOTE
Back pain.  Patient was given prescription for Medrol Dosepak to use as directed.  He may apply heat and/or ice to the affected area for 20 minutes 3 times daily.  Patient will call if symptoms persist after medication completed

## 2025-01-07 ENCOUNTER — PROCEDURE VISIT (OUTPATIENT)
Dept: FAMILY MEDICINE CLINIC | Facility: CLINIC | Age: 60
End: 2025-01-07
Payer: COMMERCIAL

## 2025-01-07 VITALS
TEMPERATURE: 98 F | WEIGHT: 199.4 LBS | BODY MASS INDEX: 31.3 KG/M2 | SYSTOLIC BLOOD PRESSURE: 134 MMHG | HEIGHT: 67 IN | OXYGEN SATURATION: 98 % | DIASTOLIC BLOOD PRESSURE: 76 MMHG | HEART RATE: 71 BPM

## 2025-01-07 DIAGNOSIS — M99.03 SEGMENTAL AND SOMATIC DYSFUNCTION OF LUMBAR REGION: ICD-10-CM

## 2025-01-07 DIAGNOSIS — S39.012A LUMBAR STRAIN, INITIAL ENCOUNTER: ICD-10-CM

## 2025-01-07 DIAGNOSIS — M99.04 SACRAL REGION SOMATIC DYSFUNCTION: ICD-10-CM

## 2025-01-07 DIAGNOSIS — M54.50 ACUTE RIGHT-SIDED LOW BACK PAIN WITHOUT SCIATICA: Primary | ICD-10-CM

## 2025-01-07 DIAGNOSIS — S39.012A STRAIN, SACRAL, INITIAL ENCOUNTER: ICD-10-CM

## 2025-01-07 DIAGNOSIS — S39.013A STRAIN OF MUSCLE, FASCIA AND TENDON OF PELVIS, INITIAL ENCOUNTER: ICD-10-CM

## 2025-01-07 DIAGNOSIS — M99.05 PELVIC SOMATIC DYSFUNCTION: ICD-10-CM

## 2025-01-07 PROCEDURE — 98926 OSTEOPATH MANJ 3-4 REGIONS: CPT | Performed by: FAMILY MEDICINE

## 2025-01-08 NOTE — PROGRESS NOTES
"Name: Clif Li      : 1965      MRN: [id number]  Encounter Provider: Blair Velasquez DO  Encounter Date: 2025   Encounter department: [facility]    Assessment & Plan    1. Acute right-sided low back pain without sciatica  2. Pelvic somatic dysfunction  3. Strain of muscle, fascia and tendon of pelvis, initial encounter  4. Lumbar strain, initial encounter  5. Segmental and somatic dysfunction of lumbar region  6. Sacral region somatic dysfunction  7. Strain, sacral, initial encounter    Subjective    Low back pain    Back Pain  This is a chronic problem. The current episode started 1 to 4 weeks ago. The problem occurs constantly. The problem is unchanged. The pain is present in the lumbar spine and sacro-iliac. The pain does not radiate. The pain is at a severity of 5/10. The pain is moderate. The pain is The same all the time. The symptoms are aggravated by bending, standing, sitting and twisting. He has tried ice, heat and analgesics for the symptoms. The treatment provided no relief.        Objective    /76 (BP Location: Left arm, Patient Position: Sitting, Cuff Size: Standard)   Pulse 71   Temp 98 °F (36.7 °C) (Temporal)   Ht 5' 7\" (1.702 m)   Wt 90.4 kg (199 lb 6.4 oz)   SpO2 98%   BMI 31.23 kg/m²      OMT    Performed by: Blair Velasquez DO  Authorized by: Blair Velasquez DO  Universal Protocol:  Consent: Verbal consent obtained.  Consent given by: patient      Procedure Details:     Region evaluated and treated:  Lumbar, Sacrum/Pelvis and Pelvis Innominate    Lumbar details:     Examination Method:  Asymmetry, Misalignment, Crepitation, Defects, Masses and Tenderness, Pain    Severity:  Mild    Treatment Method:  Soft Tissue Treatment and High Velocity, Low Amplitude Treatment    Response:  Resolved - The somatic dysfunction is completely resolved without evidence of it ever having been present.    Sacrum/Pelvis details:     Examination Method:  Asymmetry, Misalignment, " Crepitation, Defects, Masses and Tenderness, Pain    Severity:  Moderate    Treatment Method:  Muscle Energy Treatment, High Velocity, Low Amplitude Treatment and Soft Tissue Treatment    Response:  Improved - The somatic dysfunction is improved but not completely resolved.    Pelvis Innominate details:     Examination Method:  Asymmetry, Misalignment, Crepitation, Defects, Masses and Tenderness, Pain    Severity:  Moderate    Treatment Method:  High Velocity, Low Amplitude Treatment and Muscle Energy Treatment    Response:  Improved - The somatic dysfunction is improved but not completely resolved.    Total Regions Treated:  3    Blair Velasquez DO

## 2025-01-08 NOTE — PROGRESS NOTES
"Name: Clif Li      : 1965      MRN: [id number]  Encounter Provider: Blair Velasquez DO  Encounter Date: 2025   Encounter department: [facility]  :  Assessment & Plan  Acute right-sided low back pain without sciatica         Pelvic somatic dysfunction         Strain of muscle, fascia and tendon of pelvis, initial encounter         Lumbar strain, initial encounter         Segmental and somatic dysfunction of lumbar region         Sacral region somatic dysfunction         Strain, sacral, initial encounter           No heat.     History of Present Illness     HPI  Review of Systems    Objective   /76 (BP Location: Left arm, Patient Position: Sitting, Cuff Size: Standard)   Pulse 71   Temp 98 °F (36.7 °C) (Temporal)   Ht 5' 7\" (1.702 m)   Wt 90.4 kg (199 lb 6.4 oz)   SpO2 98%   BMI 31.23 kg/m²      Physical Exam    "

## 2025-01-10 ENCOUNTER — PROCEDURE VISIT (OUTPATIENT)
Dept: FAMILY MEDICINE CLINIC | Facility: CLINIC | Age: 60
End: 2025-01-10
Payer: COMMERCIAL

## 2025-01-10 DIAGNOSIS — G89.29 CHRONIC RIGHT-SIDED LOW BACK PAIN WITHOUT SCIATICA: Primary | ICD-10-CM

## 2025-01-10 DIAGNOSIS — S39.013D STRAIN OF MUSCLE, FASCIA AND TENDON OF PELVIS, SUBSEQUENT ENCOUNTER: ICD-10-CM

## 2025-01-10 DIAGNOSIS — M99.04 SACRAL REGION SOMATIC DYSFUNCTION: ICD-10-CM

## 2025-01-10 DIAGNOSIS — M54.50 CHRONIC RIGHT-SIDED LOW BACK PAIN WITHOUT SCIATICA: Primary | ICD-10-CM

## 2025-01-10 DIAGNOSIS — S39.012D LUMBAR STRAIN, SUBSEQUENT ENCOUNTER: ICD-10-CM

## 2025-01-10 DIAGNOSIS — S39.012D STRAIN, SACRAL, SUBSEQUENT ENCOUNTER: ICD-10-CM

## 2025-01-10 DIAGNOSIS — M99.03 SEGMENTAL AND SOMATIC DYSFUNCTION OF LUMBAR REGION: ICD-10-CM

## 2025-01-10 DIAGNOSIS — M99.05 PELVIC SOMATIC DYSFUNCTION: ICD-10-CM

## 2025-01-10 PROCEDURE — 99214 OFFICE O/P EST MOD 30 MIN: CPT | Performed by: FAMILY MEDICINE

## 2025-01-10 PROCEDURE — 98926 OSTEOPATH MANJ 3-4 REGIONS: CPT | Performed by: FAMILY MEDICINE

## 2025-01-10 RX ORDER — METHYLPREDNISOLONE 4 MG/1
TABLET ORAL
Qty: 21 EACH | Refills: 0 | Status: SHIPPED | OUTPATIENT
Start: 2025-01-10

## 2025-01-10 NOTE — PROGRESS NOTES
Name: Clif Li      : 1965      MRN: [id number]  Encounter Provider: Balir Velasquez DO  Encounter Date: 1/10/2025   Encounter department: [facility]  :  Assessment & Plan  Chronic right-sided low back pain without sciatica  Manual therapy.         Lumbar strain, subsequent encounter         Segmental and somatic dysfunction of lumbar region         Sacral region somatic dysfunction         Strain, sacral, subsequent encounter         Pelvic somatic dysfunction         Strain of muscle, fascia and tendon of pelvis, subsequent encounter                History of Present Illness     RLB pain , improved but continues - sittint thwe worst.  Acute on chronic past couple weeks - chronic with pain on and off for years.    Back Pain  This is a chronic problem. The current episode started more than 1 year ago. The problem occurs intermittently. The problem has been gradually improving since onset. The pain is present in the sacro-iliac and lumbar spine. The pain is at a severity of 3/10. The pain is moderate. The pain is Worse during the day. The symptoms are aggravated by sitting. He has tried analgesics and home exercises for the symptoms. The treatment provided no relief.     Review of Systems   Musculoskeletal:  Positive for back pain.       Objective   There were no vitals taken for this visit.     Physical Exam

## 2025-01-10 NOTE — PROGRESS NOTES
Name: Clif Li      : 1965      MRN: [id number]  Encounter Provider: Blair Velasquez DO  Encounter Date: 1/10/2025   Encounter department: [facility]    Assessment & Plan    1. Chronic right-sided low back pain without sciatica  Assessment & Plan:  Manual therapy.         2. Lumbar strain, subsequent encounter  3. Segmental and somatic dysfunction of lumbar region  4. Sacral region somatic dysfunction  5. Strain, sacral, subsequent encounter  6. Pelvic somatic dysfunction  7. Strain of muscle, fascia and tendon of pelvis, subsequent encounter    Subjective    RLB pain    Back Pain  This is a chronic problem. The current episode started 1 to 4 weeks ago. The problem occurs intermittently. The problem has been gradually improving since onset. The pain is present in the lumbar spine and sacro-iliac. The pain is at a severity of 3/10. The symptoms are aggravated by sitting.        Objective    There were no vitals taken for this visit.     OMT    Performed by: Blair Velasquez DO  Authorized by: Blair Velasquez DO  Universal Protocol:  Consent: Verbal consent obtained.  Consent given by: patient      Procedure Details:     Region evaluated and treated:  Lumbar, Sacrum/Pelvis and Pelvis Innominate    Lumbar details:     Examination Method:  Asymmetry, Misalignment, Crepitation, Defects, Masses    Severity:  Mild    Treatment Method:  Soft Tissue Treatment    Response:  Resolved - The somatic dysfunction is completely resolved without evidence of it ever having been present.    Sacrum/Pelvis details:     Examination Method:  Asymmetry, Misalignment, Crepitation, Defects, Masses and Tenderness, Pain    Severity:  Mild    Treatment Method:  Soft Tissue Treatment, High Velocity, Low Amplitude Treatment and Myofascial Release Treatment    Response:  Improved - The somatic dysfunction is improved but not completely resolved.    Pelvis Innominate details:     Examination Method:  Asymmetry, Misalignment,  Crepitation, Defects, Masses    Severity:  Mild    Treatment Method:  Muscle Energy Treatment    Response:  Resolved - The somatic dysfunction is completely resolved without evidence of it ever having been present.    Total Regions Treated:  3    Blair Velasquez DO

## 2025-02-01 DIAGNOSIS — N13.8 BPH WITH OBSTRUCTION/LOWER URINARY TRACT SYMPTOMS: ICD-10-CM

## 2025-02-01 DIAGNOSIS — R97.20 ELEVATED PROSTATE SPECIFIC ANTIGEN (PSA): ICD-10-CM

## 2025-02-01 DIAGNOSIS — N40.1 BPH WITH OBSTRUCTION/LOWER URINARY TRACT SYMPTOMS: ICD-10-CM

## 2025-02-03 RX ORDER — TADALAFIL 5 MG/1
5 TABLET ORAL DAILY
Qty: 90 TABLET | Refills: 1 | Status: SHIPPED | OUTPATIENT
Start: 2025-02-03

## 2025-02-03 RX ORDER — TADALAFIL 5 MG/1
5 TABLET ORAL DAILY
Qty: 90 TABLET | Refills: 0 | OUTPATIENT
Start: 2025-02-03

## 2025-03-18 ENCOUNTER — OFFICE VISIT (OUTPATIENT)
Dept: FAMILY MEDICINE CLINIC | Facility: CLINIC | Age: 60
End: 2025-03-18
Payer: COMMERCIAL

## 2025-03-18 VITALS
BODY MASS INDEX: 31.86 KG/M2 | HEART RATE: 69 BPM | TEMPERATURE: 98.2 F | OXYGEN SATURATION: 99 % | SYSTOLIC BLOOD PRESSURE: 120 MMHG | DIASTOLIC BLOOD PRESSURE: 78 MMHG | HEIGHT: 67 IN | RESPIRATION RATE: 16 BRPM | WEIGHT: 203 LBS

## 2025-03-18 DIAGNOSIS — J06.9 UPPER RESPIRATORY TRACT INFECTION, UNSPECIFIED TYPE: Primary | ICD-10-CM

## 2025-03-18 DIAGNOSIS — I10 ESSENTIAL HYPERTENSION: ICD-10-CM

## 2025-03-18 DIAGNOSIS — G47.09 OTHER INSOMNIA: ICD-10-CM

## 2025-03-18 DIAGNOSIS — Z00.00 ANNUAL PHYSICAL EXAM: ICD-10-CM

## 2025-03-18 DIAGNOSIS — F41.9 ANXIETY: ICD-10-CM

## 2025-03-18 PROCEDURE — 99396 PREV VISIT EST AGE 40-64: CPT | Performed by: FAMILY MEDICINE

## 2025-03-18 PROCEDURE — 99213 OFFICE O/P EST LOW 20 MIN: CPT | Performed by: FAMILY MEDICINE

## 2025-03-18 RX ORDER — LORAZEPAM 0.5 MG/1
TABLET ORAL
Qty: 6 TABLET | Refills: 0 | Status: SHIPPED | OUTPATIENT
Start: 2025-03-18

## 2025-03-18 RX ORDER — AZITHROMYCIN 250 MG/1
TABLET, FILM COATED ORAL
Qty: 6 TABLET | Refills: 0 | Status: SHIPPED | OUTPATIENT
Start: 2025-03-18 | End: 2025-03-23

## 2025-03-18 RX ORDER — ZOLPIDEM TARTRATE 10 MG/1
10 TABLET ORAL
Qty: 20 TABLET | Refills: 0 | Status: SHIPPED | OUTPATIENT
Start: 2025-03-18

## 2025-03-18 NOTE — ASSESSMENT & PLAN NOTE
Insomnia.  Patient given prescription for Ambien 10 mg to use at bedtime especially during his trip to [place].

## 2025-03-18 NOTE — ASSESSMENT & PLAN NOTE
Annual wellness exam.  Overall the patient appears to be in stable health.  His most recent blood tests are up-to-date.  His colonoscopy is up-to-date as well as his PSA

## 2025-03-18 NOTE — PROGRESS NOTES
FAMILY PRACTICE OFFICE VISIT       NAME: Clif Li  AGE: 59 y.o. SEX: male       : 1965        MRN: [id number]    DATE: 3/18/2025  TIME: 11:54 AM    Assessment and Plan     Problem List Items Addressed This Visit       Essential hypertension    Hypertension.  The patient's blood pressure is stable at this time and he will continue current regimen of medications         Anxiety    Anxiety.  Patient given prescription of Ativan 0.5 mg to use for his flight to and from [place].  He is aware to not combine with his Ambien.         Relevant Medications    LORazepam (Ativan) 0.5 mg tablet    Other insomnia    Insomnia.  Patient given prescription for Ambien 10 mg to use at bedtime especially during his trip to [place].         Relevant Medications    zolpidem (AMBIEN) 10 mg tablet    Annual physical exam    Annual wellness exam.  Overall the patient appears to be in stable health.  His most recent blood tests are up-to-date.  His colonoscopy is up-to-date as well as his PSA          Other Visit Diagnoses         Upper respiratory tract infection, unspecified type    -  Primary    Relevant Medications    azithromycin (Zithromax) 250 mg tablet                Chief Complaint     Chief Complaint   Patient presents with    Physical Exam       History of Present Illness     Patient in the office for annual wellness exam.  Patient will be traveling to [place] for the first time with his wife on a tour through [place].  He does see his urologist and nephrologist on a regular basis.  He denies any recent illness.  He does exercise on a regular basis at the gym.  He denies any chest pain or shortness of breath.  His colonoscopy is up-to-date from .    The patient does experience claustrophobia especially on planes and requested medication for anxiety for his 10-hour flight to [place].  He also requested trial of Ambien due to his history of insomnia where he may fall asleep but awaken at 2 or 3 in the morning and watches  television for an hour before trying to fall back to sleep.        Review of Systems   Review of Systems   Constitutional: Negative.    HENT: Negative.     Eyes: Negative.    Respiratory: Negative.     Cardiovascular: Negative.    Gastrointestinal: Negative.    Genitourinary: Negative.    Musculoskeletal: Negative.    Skin: Negative.    Neurological: Negative.    Psychiatric/Behavioral:  The patient is nervous/anxious.        Active Problem List     Patient Active Problem List   Diagnosis    Essential hypertension    BPH with urinary obstruction    Elevated prostate specific antigen (PSA)    Angiomyolipoma of kidney    Mixed erectile dysfunction    Impingement syndrome of right shoulder    Thrombocytopenia (HCC)    Urticaria    Hemorrhoids, external, thrombosed    Impingement syndrome of left shoulder    Subacromial bursitis of right shoulder joint    Papilloma of palate    Benign paroxysmal positional vertigo    Congenital foot deformity    Family history of heart disease    Coronary artery calcification    Stage 3a chronic kidney disease (HCC)    Ascending aorta dilatation (HCC)    Chronic right-sided low back pain without sciatica    PONV (postoperative nausea and vomiting)    Anxiety    Other insomnia    Annual physical exam       Past Medical History:  Past Medical History:   Diagnosis Date    Benign prostatic hyperplasia     Chronic kidney disease     CKD    Herpes simplex     RESOLVED [id number]    Hypertension     PONV (postoperative nausea and vomiting)     Renal disorder        Past Surgical History:  Past Surgical History:   Procedure Laterality Date    COLONOSCOPY  12/23/2015    DR GUARDADO    [place] PROSTATE NEEDLE BIOPSY ANY APPROACH N/A 10/9/2024    Procedure: TRANSPERINEAL MRI FUSION BX PROSTATE;  Surgeon: Rico Maradiaga MD;  Location: [facility];  Service: Urology    PROSTATE BIOPSY         Family History:  Family History   Problem Relation Age of Onset    Other Mother         CABG age 70s    Diabetes  Mother         type 2    Hypertension Mother     Hypertension Father     Heart attack Father         in his 60s but was big smoker    Hypertension Sister     Hypertension Brother     Celiac disease Brother     Eczema Daughter     No Known Problems Son     Kidney cancer Family     Prostate cancer Family        Social History:  Social History     Socioeconomic History    Marital status: /Civil Union     Spouse name: Ivana     Number of children: 2    Years of education: Not on file    Highest education level: Not on file   Occupational History    Occupation: /[employer]   Tobacco Use    Smoking status: Never     Passive exposure: Past    Smokeless tobacco: Never   Vaping Use    Vaping status: Never Used   Substance and Sexual Activity    Alcohol use: No    Drug use: No    Sexual activity: Yes   Other Topics Concern    Not on file   Social History Narrative    Not on file     Social Drivers of Health     Financial Resource Strain: Not on file   Food Insecurity: Not on file   Transportation Needs: Not on file   Physical Activity: Sufficiently Active (8/13/2020)    Exercise Vital Sign     Days of Exercise per Week: 6 days     Minutes of Exercise per Session: 80 min   Stress: Not on file   Social Connections: Not on file   Intimate Partner Violence: Not on file   Housing Stability: Not on file       Objective     Vitals:    03/18/25 0844   BP: 120/78   Pulse: 69   Resp: 16   Temp: 98.2 °F (36.8 °C)   SpO2: 99%     Wt Readings from Last 3 Encounters:   03/18/25 92.1 kg (203 lb)   01/07/25 90.4 kg (199 lb 6.4 oz)   12/19/24 90.3 kg (199 lb 2 oz)       Physical Exam  Constitutional:       General: He is not in acute distress.     Appearance: Normal appearance. He is not ill-appearing.   HENT:      Head: Normocephalic and atraumatic.      Right Ear: Tympanic membrane, ear canal and external ear normal. There is no impacted cerumen.      Left Ear: Tympanic membrane, ear canal and external ear normal. There is  no impacted cerumen.      Mouth/Throat:      Mouth: Mucous membranes are moist.      Pharynx: No oropharyngeal exudate or posterior oropharyngeal erythema.   Eyes:      General:         Right eye: No discharge.         Left eye: No discharge.      Extraocular Movements: Extraocular movements intact.      Conjunctiva/sclera: Conjunctivae normal.      Pupils: Pupils are equal, round, and reactive to light.   Neck:      Vascular: No carotid bruit.   Cardiovascular:      Rate and Rhythm: Normal rate and regular rhythm.      Heart sounds: Normal heart sounds. No murmur heard.  Pulmonary:      Effort: Pulmonary effort is normal.      Breath sounds: Normal breath sounds. No wheezing, rhonchi or rales.   Abdominal:      General: Abdomen is flat. Bowel sounds are normal. There is no distension.      Palpations: Abdomen is soft.      Tenderness: There is no abdominal tenderness. There is no guarding or rebound.   Musculoskeletal:      Right lower leg: No edema.      Left lower leg: No edema.   Lymphadenopathy:      Cervical: No cervical adenopathy.   Skin:     Findings: No rash.   Neurological:      General: No focal deficit present.      Mental Status: He is alert and oriented to person, place, and time.      Cranial Nerves: No cranial nerve deficit.   Psychiatric:         Mood and Affect: Mood normal.         Behavior: Behavior normal.         Thought Content: Thought content normal.         Judgment: Judgment normal.         Pertinent Laboratory/Diagnostic Studies:  Lab Results   Component Value Date    GLUCOSE 97 01/20/2014    BUN 19 02/24/2024    CREATININE 1.50 (H) 02/24/2024    CALCIUM 8.9 02/24/2024     12/21/2017    K 4.1 02/24/2024    CO2 26 02/24/2024     02/24/2024     Lab Results   Component Value Date    ALT 19 02/24/2024    AST 19 02/24/2024    ALKPHOS 43 02/24/2024    BILITOT 0.8 12/21/2017       Lab Results   Component Value Date    WBC 4.7 02/24/2024    HGB 14.3 02/24/2024    HCT 43.3 02/24/2024     MCV 90.2 02/24/2024     (L) 02/24/2024       Lab Results   Component Value Date    TSH 2.63 02/24/2024       Lab Results   Component Value Date    CHOL 158 07/15/2017     Lab Results   Component Value Date    TRIG 48 02/24/2024     Lab Results   Component Value Date    HDL 53 02/24/2024     Lab Results   Component Value Date    LDLCALC 74 02/24/2024     Lab Results   Component Value Date    HGBA1C 5.0 09/18/2021       Results for orders placed or performed in visit on 10/18/24   POCT Measure PVR   Result Value Ref Range    POST-VOID RESIDUAL VOLUME, ML POC 58 mL       No orders of the defined types were placed in this encounter.      ALLERGIES:  Allergies   Allergen Reactions    Nsaids Other (See Comments)     CKD    Moxifloxacin Itching and Rash    Lisinopril Rash    Valsartan Rash     Only generic form cause reaction       Current Medications     Current Outpatient Medications   Medication Sig Dispense Refill    amLODIPine (NORVASC) 10 mg tablet TAKE ONE TABLET BY MOUTH EVERY DAY 90 tablet 3    azithromycin (Zithromax) 250 mg tablet Take 2 tablets (500 mg total) by mouth daily for 1 day, THEN 1 tablet (250 mg total) daily for 4 days. 6 tablet 0    betamethasone dipropionate (DIPROSONE) 0.05 % cream APPLY ONCE TO TWICE A DAY FOR 1 TO 2 WEEKS TO THE ARMS AND LEGS AS NEEDED FOR FLARES      Cholecalciferol (VITAMIN D3) 2000 units TABS Take 1 tablet by mouth daily        Diovan 160 MG tablet Take 1 tablet (160 mg total) by mouth daily 90 tablet 3    Kerendia 20 MG TABS Take 1 tablet by mouth daily      LORazepam (Ativan) 0.5 mg tablet One po q day prn anxiety 6 tablet 0    Misc Natural Products (TURMERIC CURCUMIN) CAPS Take 1 capsule by mouth 2 (two) times a day 2400mg twice daily       Multiple Vitamins-Minerals (CENTRUM SILVER 50+MEN PO) Take by mouth      tadalafil (CIALIS) 5 MG tablet TAKE ONE TABLET BY MOUTH EVERY DAY 90 tablet 1    zolpidem (AMBIEN) 10 mg tablet Take 1 tablet (10 mg total) by mouth  daily at bedtime as needed for sleep 20 tablet 0     No current facility-administered medications for this visit.         Health Maintenance     Health Maintenance   Topic Date Due    HIV Screening  Never done    DTaP,Tdap,and Td Vaccines (1 - Tdap) Never done    Zoster Vaccine (1 of 2) Never done    Influenza Vaccine (1) 09/01/2024    COVID-19 Vaccine (4 - 2024-25 season) 09/01/2024    Annual Physical  03/05/2025    Depression Screening  03/18/2026    Colorectal Cancer Screening  12/19/2030    Hepatitis C Screening  Completed    Meningococcal B Vaccine  Aged Out    RSV Vaccine age 0-20 Months  Aged Out    Pneumococcal Vaccine: Pediatrics (0 to 5 Years) and At-Risk Patients (6 to 64 Years)  Aged Out    HIB Vaccine  Aged Out    IPV Vaccine  Aged Out    Hepatitis A Vaccine  Aged Out    Meningococcal ACWY Vaccine  Aged Out    HPV Vaccine  Aged Out     Immunization History   Administered Date(s) Administered    COVID-19 MODERNA VACC 0.5 ML IM 03/17/2021, 04/14/2021, 10/29/2021    INFLUENZA 08/20/2016, 10/20/2017    Influenza Quadrivalent Preservative Free 3 years and older IM 10/20/2017    Influenza, seasonal, injectable 08/20/2016       Connor Fernández MD

## 2025-03-18 NOTE — ASSESSMENT & PLAN NOTE
Anxiety.  Patient given prescription of Ativan 0.5 mg to use for his flight to and from [place].  He is aware to not combine with his Ambien.

## 2025-03-23 ENCOUNTER — PATIENT MESSAGE (OUTPATIENT)
Dept: FAMILY MEDICINE CLINIC | Facility: CLINIC | Age: 60
End: 2025-03-23

## 2025-03-24 DIAGNOSIS — R53.83 OTHER FATIGUE: Primary | ICD-10-CM

## 2025-03-24 NOTE — PATIENT COMMUNICATION
Called and left a message for patient if tues @ 2pm was good for the patient and asked to please give us a call to schedule

## 2025-03-24 NOTE — PATIENT COMMUNICATION
Pt called back, he's in the office in [place]  and can not come in at 2 today. He's leaving to [place] on Saturday. Was wondering if Dr Gardner could order the labs for him or give him a call. Please advise and notify pt

## 2025-03-26 ENCOUNTER — OFFICE VISIT (OUTPATIENT)
Dept: FAMILY MEDICINE CLINIC | Facility: CLINIC | Age: 60
End: 2025-03-26
Payer: COMMERCIAL

## 2025-03-26 VITALS
OXYGEN SATURATION: 98 % | DIASTOLIC BLOOD PRESSURE: 80 MMHG | RESPIRATION RATE: 18 BRPM | HEART RATE: 60 BPM | WEIGHT: 199.5 LBS | SYSTOLIC BLOOD PRESSURE: 122 MMHG | TEMPERATURE: 98 F | BODY MASS INDEX: 31.31 KG/M2 | HEIGHT: 67 IN

## 2025-03-26 DIAGNOSIS — I77.810 ASCENDING AORTA DILATATION (HCC): ICD-10-CM

## 2025-03-26 DIAGNOSIS — N18.31 STAGE 3A CHRONIC KIDNEY DISEASE (HCC): Chronic | ICD-10-CM

## 2025-03-26 DIAGNOSIS — R53.83 OTHER FATIGUE: ICD-10-CM

## 2025-03-26 DIAGNOSIS — I10 ESSENTIAL HYPERTENSION: ICD-10-CM

## 2025-03-26 DIAGNOSIS — I25.10 CORONARY ARTERY CALCIFICATION: ICD-10-CM

## 2025-03-26 DIAGNOSIS — M43.6 TORTICOLLIS: Primary | ICD-10-CM

## 2025-03-26 PROCEDURE — 99214 OFFICE O/P EST MOD 30 MIN: CPT | Performed by: FAMILY MEDICINE

## 2025-03-26 RX ORDER — CYCLOBENZAPRINE HCL 10 MG
TABLET ORAL
Qty: 30 TABLET | Refills: 0 | Status: SHIPPED | OUTPATIENT
Start: 2025-03-26

## 2025-03-26 RX ORDER — METHYLPREDNISOLONE 4 MG/1
TABLET ORAL
Qty: 21 EACH | Refills: 0 | Status: SHIPPED | OUTPATIENT
Start: 2025-03-26

## 2025-03-26 NOTE — PROGRESS NOTES
Name: Clif Li      : 1965      MRN: [id number]  Encounter Provider: Ellie Gagnon MD  Encounter Date: 3/26/2025   Encounter department: [facility]    Assessment & Plan  Torticollis  Patient presents with symptoms of torticollis.  Trial of Medrol Dosepak and Flexeril at night.  No symptoms of upper extremity radiculopathy.  He will contact me if symptoms are not improving  Orders:  •  methylPREDNISolone 4 MG tablet therapy pack; Use as directed on package  •  cyclobenzaprine (FLEXERIL) 10 mg tablet; Take 1 tablet at bedtime as needed for neck spas    Other fatigue  Patient presents with symptoms of fatigue.  Proceed with blood work.  Orders:  •  Hepatic function panel; Future  •  Testosterone, free, total; Future  •  Sedimentation rate, automated; Future  •  C-reactive protein; Future  •  Lyme Total AB W Reflex to IGM/IGG; Future  •  TSH, 3rd generation; Future  •  Vitamin D 25 hydroxy; Future    Stage 3a chronic kidney disease (HCC)  Lab Results   Component Value Date    EGFR 54 (L) 2024    EGFR 59 (L) 2023    EGFR 56 (L) 2022    CREATININE 1.50 (H) 2024    CREATININE 1.40 (H) 2023    CREATININE 1.45 (H) 2022   Patient is under care of nephrology  Stable GFR creatinine.  Orders:  •  CBC and differential; Future  •  Iron Panel (Includes Ferritin, Iron Sat%, Iron, and TIBC); Future    Coronary artery calcification  LPA less than 10 May 24, 2024, blood work performed at [facility]       Ascending aorta dilatation ([facility])  Patient will schedule surveillance echocardiogram in May 2025 history of mild thoracic root dilatation on previous echocardiogram  Orders:  •  Echo complete w/ contrast if indicated; Future    Essential hypertension  Blood pressure is well-controlled, continue current Rx          Patient Instructions   Non fasting blood work  Start Medrol Dosepak for 6 days.  Flexeril, muscle relaxant, at bedtime.  Do not take Flexeril along with  lorazepam  If neck pain persists we will consider x-ray and physical therapy  Echocardiogram will be due after May 9, 2025, annual surveillance of mild thoracic root dilatation      History of Present Illness     Patient presents for evaluation of fatigue and neck pain.  He is traveling to [place] in a few days and is worried about possible  Lyme disease.  No injury or fall.  He reports significant muscle spasm of neck muscles.  No upper extremity radiculopathy.  Patient also reports that his hand joints were swollen after completing prostate MRI back in July 2024.      Patient remains under ongoing care of nephrology.  Previous connective tissue disease was negative.  Patient was evaluated by rheumatology in the past, PRN follow-up was recommended.          Review of Systems   Constitutional:  Positive for fatigue.   HENT: Negative.     Respiratory: Negative.     Cardiovascular: Negative.    Musculoskeletal:  Positive for arthralgias, myalgias and neck pain.   Skin: Negative.    Neurological: Negative.    Hematological: Negative.    Psychiatric/Behavioral: Negative.       Past Medical History:   Diagnosis Date   • Benign prostatic hyperplasia    • Chronic kidney disease     CKD   • Herpes simplex     RESOLVED [id number]   • Hypertension    • PONV (postoperative nausea and vomiting)    • Renal disorder      Past Surgical History:   Procedure Laterality Date   • COLONOSCOPY  12/23/2015    DR GUARDADO   • AL PROSTATE NEEDLE BIOPSY ANY APPROACH N/A 10/9/2024    Procedure: TRANSPERINEAL MRI FUSION BX PROSTATE;  Surgeon: Rico Maradaiga MD;  Location: [facility];  Service: Urology   • PROSTATE BIOPSY       Family History   Problem Relation Age of Onset   • Other Mother         CABG age 70s   • Diabetes Mother         type 2   • Hypertension Mother    • Hypertension Father    • Heart attack Father         in his 60s but was big smoker   • Hypertension Sister    • Hypertension Brother    • Celiac disease Brother    • Eczema  "Daughter    • No Known Problems Son    • Kidney cancer Family    • Prostate cancer Family      Social History     Tobacco Use   • Smoking status: Never     Passive exposure: Past   • Smokeless tobacco: Never   Vaping Use   • Vaping status: Never Used   Substance and Sexual Activity   • Alcohol use: No   • Drug use: No   • Sexual activity: Yes     Current Outpatient Medications on File Prior to Visit   Medication Sig   • amLODIPine (NORVASC) 10 mg tablet TAKE ONE TABLET BY MOUTH EVERY DAY   • betamethasone dipropionate (DIPROSONE) 0.05 % cream APPLY ONCE TO TWICE A DAY FOR 1 TO 2 WEEKS TO THE ARMS AND LEGS AS NEEDED FOR FLARES   • Cholecalciferol (VITAMIN D3) 2000 units TABS Take 1 tablet by mouth daily     • Diovan 160 MG tablet Take 1 tablet (160 mg total) by mouth daily   • Kerendia 20 MG TABS Take 1 tablet by mouth daily   • LORazepam (Ativan) 0.5 mg tablet One po q day prn anxiety   • Misc Natural Products (TURMERIC CURCUMIN) CAPS Take 1 capsule by mouth 2 (two) times a day 2400mg twice daily    • Multiple Vitamins-Minerals (CENTRUM SILVER 50+MEN PO) Take by mouth   • tadalafil (CIALIS) 5 MG tablet TAKE ONE TABLET BY MOUTH EVERY DAY   • zolpidem (AMBIEN) 10 mg tablet Take 1 tablet (10 mg total) by mouth daily at bedtime as needed for sleep     Allergies   Allergen Reactions   • Nsaids Other (See Comments)     CKD   • Moxifloxacin Itching and Rash   • Lisinopril Rash   • Valsartan Rash     Only generic form cause reaction     Immunization History   Administered Date(s) Administered   • COVID-19 MODERNA VACC 0.5 ML IM 03/17/2021, 04/14/2021, 10/29/2021   • INFLUENZA 08/20/2016, 10/20/2017   • Influenza Quadrivalent Preservative Free 3 years and older IM 10/20/2017   • Influenza, seasonal, injectable 08/20/2016     Objective   /80 (Patient Position: Sitting, Cuff Size: Large)   Pulse 60   Temp 98 °F (36.7 °C) (Temporal)   Resp 18   Ht 5' 7\" (1.702 m)   Wt 90.5 kg (199 lb 8 oz)   SpO2 98%   BMI 31.25 " kg/m²     Physical Exam  Vitals and nursing note reviewed.   Constitutional:       General: He is not in acute distress.     Appearance: Normal appearance. He is not ill-appearing.   HENT:      Head: Normocephalic and atraumatic.   Eyes:      General: No scleral icterus.     Conjunctiva/sclera: Conjunctivae normal.   Neck:      Vascular: No carotid bruit.   Cardiovascular:      Rate and Rhythm: Normal rate and regular rhythm.      Heart sounds: Normal heart sounds. No murmur heard.  Pulmonary:      Effort: Pulmonary effort is normal. No respiratory distress.      Breath sounds: Normal breath sounds.   Musculoskeletal:         General: Normal range of motion.      Cervical back: Neck supple. No rigidity.      Right lower leg: No edema.      Left lower leg: No edema.      Comments: No active synovitis.  No joint edema.  Neck spasm.  Painful lateral range of motion, painful extension.  Flexion is unlimited.   Skin:     General: Skin is warm.      Findings: No rash.   Neurological:      General: No focal deficit present.      Mental Status: He is alert and oriented to person, place, and time.   Psychiatric:         Mood and Affect: Mood normal.         Behavior: Behavior normal.         Thought Content: Thought content normal.

## 2025-03-26 NOTE — ASSESSMENT & PLAN NOTE
Patient will schedule surveillance echocardiogram in May 2025 history of mild thoracic root dilatation on previous echocardiogram  Orders:  •  Echo complete w/ contrast if indicated; Future

## 2025-03-26 NOTE — ASSESSMENT & PLAN NOTE
Lab Results   Component Value Date    EGFR 54 (L) 02/24/2024    EGFR 59 (L) 05/02/2023    EGFR 56 (L) 12/12/2022    CREATININE 1.50 (H) 02/24/2024    CREATININE 1.40 (H) 05/02/2023    CREATININE 1.45 (H) 12/12/2022   Patient is under care of nephrology  Stable GFR creatinine.  Orders:  •  CBC and differential; Future  •  Iron Panel (Includes Ferritin, Iron Sat%, Iron, and TIBC); Future

## 2025-03-26 NOTE — PATIENT INSTRUCTIONS
Non fasting blood work  Start Medrol Dosepak for 6 days.  Flexeril, muscle relaxant, at bedtime.  Do not take Flexeril along with lorazepam  If neck pain persists we will consider x-ray and physical therapy  Echocardiogram will be due after May 9, 2025, annual surveillance of mild thoracic root dilatation

## 2025-03-28 ENCOUNTER — RESULTS FOLLOW-UP (OUTPATIENT)
Dept: FAMILY MEDICINE CLINIC | Facility: CLINIC | Age: 60
End: 2025-03-28

## 2025-03-28 LAB
25(OH)D3 SERPL-MCNC: 52 NG/ML (ref 30–100)
ALBUMIN SERPL-MCNC: 4.6 G/DL (ref 3.6–5.1)
ALBUMIN/GLOB SERPL: 1.8 (CALC) (ref 1–2.5)
ALP SERPL-CCNC: 43 U/L (ref 35–144)
ALT SERPL-CCNC: 22 U/L (ref 9–46)
AST SERPL-CCNC: 24 U/L (ref 10–35)
B BURGDOR AB SER IA-ACNC: <0.9 INDEX
BASOPHILS # BLD AUTO: 20 CELLS/UL (ref 0–200)
BASOPHILS NFR BLD AUTO: 0.4 %
BILIRUB DIRECT SERPL-MCNC: 0.2 MG/DL
BILIRUB INDIRECT SERPL-MCNC: 0.6 MG/DL (CALC) (ref 0.2–1.2)
BILIRUB SERPL-MCNC: 0.8 MG/DL (ref 0.2–1.2)
CRP SERPL-MCNC: <3 MG/L
EOSINOPHIL # BLD AUTO: 184 CELLS/UL (ref 15–500)
EOSINOPHIL NFR BLD AUTO: 3.6 %
ERYTHROCYTE [DISTWIDTH] IN BLOOD BY AUTOMATED COUNT: 12 % (ref 11–15)
ERYTHROCYTE [SEDIMENTATION RATE] IN BLOOD BY WESTERGREN METHOD: 2 MM/H
FERRITIN SERPL-MCNC: 171 NG/ML (ref 38–380)
GLOBULIN SER CALC-MCNC: 2.5 G/DL (CALC) (ref 1.9–3.7)
HCT VFR BLD AUTO: 44.2 % (ref 38.5–50)
HGB BLD-MCNC: 14.6 G/DL (ref 13.2–17.1)
IRON SATN MFR SERPL: 36 % (CALC) (ref 20–48)
IRON SERPL-MCNC: 99 MCG/DL (ref 50–180)
LYMPHOCYTES # BLD AUTO: 1377 CELLS/UL (ref 850–3900)
LYMPHOCYTES NFR BLD AUTO: 27 %
MCH RBC QN AUTO: 30 PG (ref 27–33)
MCHC RBC AUTO-ENTMCNC: 33 G/DL (ref 32–36)
MCV RBC AUTO: 90.8 FL (ref 80–100)
MONOCYTES # BLD AUTO: 663 CELLS/UL (ref 200–950)
MONOCYTES NFR BLD AUTO: 13 %
NEUTROPHILS # BLD AUTO: 2856 CELLS/UL (ref 1500–7800)
NEUTROPHILS NFR BLD AUTO: 56 %
PLATELET # BLD AUTO: NORMAL THOUSAND/UL
PROT SERPL-MCNC: 7.1 G/DL (ref 6.1–8.1)
RBC # BLD AUTO: 4.87 MILLION/UL (ref 4.2–5.8)
SERVICE CMNT-IMP: NORMAL
TIBC SERPL-MCNC: 278 MCG/DL (CALC) (ref 250–425)
TSH SERPL-ACNC: 1.98 MIU/L (ref 0.4–4.5)
WBC # BLD AUTO: 5.1 THOUSAND/UL (ref 3.8–10.8)

## 2025-04-01 LAB
25(OH)D3 SERPL-MCNC: 52 NG/ML (ref 30–100)
ALBUMIN SERPL-MCNC: 4.6 G/DL (ref 3.6–5.1)
ALBUMIN/GLOB SERPL: 1.8 (CALC) (ref 1–2.5)
ALP SERPL-CCNC: 43 U/L (ref 35–144)
ALT SERPL-CCNC: 22 U/L (ref 9–46)
AST SERPL-CCNC: 24 U/L (ref 10–35)
B BURGDOR AB SER IA-ACNC: <0.9 INDEX
BASOPHILS # BLD AUTO: 20 CELLS/UL (ref 0–200)
BASOPHILS NFR BLD AUTO: 0.4 %
BILIRUB DIRECT SERPL-MCNC: 0.2 MG/DL
BILIRUB INDIRECT SERPL-MCNC: 0.6 MG/DL (CALC) (ref 0.2–1.2)
BILIRUB SERPL-MCNC: 0.8 MG/DL (ref 0.2–1.2)
CRP SERPL-MCNC: <3 MG/L
EOSINOPHIL # BLD AUTO: 184 CELLS/UL (ref 15–500)
EOSINOPHIL NFR BLD AUTO: 3.6 %
ERYTHROCYTE [DISTWIDTH] IN BLOOD BY AUTOMATED COUNT: 12 % (ref 11–15)
ERYTHROCYTE [SEDIMENTATION RATE] IN BLOOD BY WESTERGREN METHOD: 2 MM/H
FERRITIN SERPL-MCNC: 171 NG/ML (ref 38–380)
GLOBULIN SER CALC-MCNC: 2.5 G/DL (CALC) (ref 1.9–3.7)
HCT VFR BLD AUTO: 44.2 % (ref 38.5–50)
HGB BLD-MCNC: 14.6 G/DL (ref 13.2–17.1)
IRON SATN MFR SERPL: 36 % (CALC) (ref 20–48)
IRON SERPL-MCNC: 99 MCG/DL (ref 50–180)
LYMPHOCYTES # BLD AUTO: 1377 CELLS/UL (ref 850–3900)
LYMPHOCYTES NFR BLD AUTO: 27 %
MCH RBC QN AUTO: 30 PG (ref 27–33)
MCHC RBC AUTO-ENTMCNC: 33 G/DL (ref 32–36)
MCV RBC AUTO: 90.8 FL (ref 80–100)
MONOCYTES # BLD AUTO: 663 CELLS/UL (ref 200–950)
MONOCYTES NFR BLD AUTO: 13 %
NEUTROPHILS # BLD AUTO: 2856 CELLS/UL (ref 1500–7800)
NEUTROPHILS NFR BLD AUTO: 56 %
PLATELET # BLD AUTO: NORMAL THOUSAND/UL
PROT SERPL-MCNC: 7.1 G/DL (ref 6.1–8.1)
RBC # BLD AUTO: 4.87 MILLION/UL (ref 4.2–5.8)
SERVICE CMNT-IMP: NORMAL
TESTOST FREE SERPL-MCNC: 88 PG/ML (ref 35–155)
TESTOST SERPL-MCNC: 368 NG/DL (ref 250–1100)
TIBC SERPL-MCNC: 278 MCG/DL (CALC) (ref 250–425)
TSH SERPL-ACNC: 1.98 MIU/L (ref 0.4–4.5)
WBC # BLD AUTO: 5.1 THOUSAND/UL (ref 3.8–10.8)

## 2025-04-11 ENCOUNTER — OFFICE VISIT (OUTPATIENT)
Dept: FAMILY MEDICINE CLINIC | Facility: CLINIC | Age: 60
End: 2025-04-11
Payer: COMMERCIAL

## 2025-04-11 VITALS
SYSTOLIC BLOOD PRESSURE: 122 MMHG | WEIGHT: 202.4 LBS | HEIGHT: 67 IN | RESPIRATION RATE: 18 BRPM | DIASTOLIC BLOOD PRESSURE: 84 MMHG | BODY MASS INDEX: 31.77 KG/M2 | OXYGEN SATURATION: 98 % | TEMPERATURE: 99.6 F | HEART RATE: 77 BPM

## 2025-04-11 DIAGNOSIS — J06.9 UPPER RESPIRATORY TRACT INFECTION, UNSPECIFIED TYPE: Primary | ICD-10-CM

## 2025-04-11 PROCEDURE — 99213 OFFICE O/P EST LOW 20 MIN: CPT | Performed by: FAMILY MEDICINE

## 2025-04-11 RX ORDER — AZITHROMYCIN 250 MG/1
TABLET, FILM COATED ORAL
Qty: 6 TABLET | Refills: 0 | Status: SHIPPED | OUTPATIENT
Start: 2025-04-11 | End: 2025-04-15

## 2025-04-11 NOTE — PROGRESS NOTES
"Name: Clif Li      : 1965      MRN: [id number]  Encounter Provider: Abbie Sevilla DO  Encounter Date: 2025   Encounter department: [facility]  :  Assessment & Plan  Upper respiratory tract infection, unspecified type    Orders:  •  azithromycin (ZITHROMAX) 250 mg tablet; Take 2 tablets today then 1 tablet daily x 4 days           History of Present Illness   Cough  Associated symptoms include wheezing.   Wheezing  Associated symptoms include coughing and wheezing.       Has had a chest cold, green sputum, fever this morning 100.4. He was seen about 2 weeks ago for cough, congestion/sinus drainage. Recently returned from [employer], was coughing on plane yesterday. Symptoms ongoing for three days now with coughing, wheezing. Just started taking Azithromycin this morning. Usually when he takes Azithromycin requires a higher dose. Took Nyquil last night which caused restless legs.   Review of Systems   Respiratory:  Positive for cough and wheezing.        Objective   /84 (BP Location: Left arm, Patient Position: Sitting, Cuff Size: Standard)   Pulse 77   Temp 99.6 °F (37.6 °C) (Temporal)   Resp 18   Ht 5' 7\" (1.702 m)   Wt 91.8 kg (202 lb 6.4 oz)   SpO2 98%   BMI 31.70 kg/m²      Physical Exam  Vitals reviewed.   Constitutional:       Appearance: Normal appearance.   Eyes:      Extraocular Movements: Extraocular movements intact.      Conjunctiva/sclera: Conjunctivae normal.   Cardiovascular:      Rate and Rhythm: Normal rate and regular rhythm.   Pulmonary:      Effort: Pulmonary effort is normal.      Breath sounds: Normal breath sounds.   Skin:     General: Skin is warm and dry.      Capillary Refill: Capillary refill takes less than 2 seconds.   Neurological:      Mental Status: He is alert.   Psychiatric:         Mood and Affect: Mood normal.         Behavior: Behavior normal.         "

## 2025-04-15 DIAGNOSIS — N18.31 STAGE 3A CHRONIC KIDNEY DISEASE (HCC): Chronic | ICD-10-CM

## 2025-04-15 DIAGNOSIS — R53.83 OTHER FATIGUE: ICD-10-CM

## 2025-04-15 DIAGNOSIS — N52.8 MIXED ERECTILE DYSFUNCTION: Primary | ICD-10-CM

## 2025-06-26 ENCOUNTER — TELEPHONE (OUTPATIENT)
Age: 60
End: 2025-06-26

## 2025-06-26 NOTE — TELEPHONE ENCOUNTER
Pt called to make annual appt for 10/20/25 and asking for PSA script to be printed and mailed to address in pt chart due to using [facility]     Pt call [phone]

## 2025-08-04 DIAGNOSIS — N40.1 BPH WITH OBSTRUCTION/LOWER URINARY TRACT SYMPTOMS: ICD-10-CM

## 2025-08-04 DIAGNOSIS — R97.20 ELEVATED PROSTATE SPECIFIC ANTIGEN (PSA): ICD-10-CM

## 2025-08-04 DIAGNOSIS — N13.8 BPH WITH OBSTRUCTION/LOWER URINARY TRACT SYMPTOMS: ICD-10-CM

## 2025-08-04 RX ORDER — TADALAFIL 5 MG/1
5 TABLET ORAL DAILY
Qty: 90 TABLET | Refills: 0 | Status: CANCELLED | OUTPATIENT
Start: 2025-08-04

## 2025-08-05 RX ORDER — TADALAFIL 5 MG/1
5 TABLET ORAL DAILY
Qty: 90 TABLET | Refills: 0 | Status: SHIPPED | OUTPATIENT
Start: 2025-08-05

## 2025-08-19 PROBLEM — R00.2 PALPITATIONS: Status: ACTIVE | Noted: 2025-08-19

## 2025-08-20 ENCOUNTER — OFFICE VISIT (OUTPATIENT)
Dept: CARDIOLOGY CLINIC | Facility: CLINIC | Age: 60
End: 2025-08-20
Payer: COMMERCIAL

## 2025-08-20 VITALS
HEART RATE: 71 BPM | OXYGEN SATURATION: 99 % | WEIGHT: 198.4 LBS | BODY MASS INDEX: 31.07 KG/M2 | SYSTOLIC BLOOD PRESSURE: 120 MMHG | DIASTOLIC BLOOD PRESSURE: 74 MMHG

## 2025-08-20 DIAGNOSIS — I10 ESSENTIAL HYPERTENSION: ICD-10-CM

## 2025-08-20 DIAGNOSIS — N18.31 STAGE 3A CHRONIC KIDNEY DISEASE (HCC): Chronic | ICD-10-CM

## 2025-08-20 DIAGNOSIS — I25.10 CORONARY ARTERY CALCIFICATION: ICD-10-CM

## 2025-08-20 DIAGNOSIS — Z82.49 FAMILY HISTORY OF HEART DISEASE: ICD-10-CM

## 2025-08-20 DIAGNOSIS — I77.810 ASCENDING AORTA DILATATION (HCC): ICD-10-CM

## 2025-08-20 DIAGNOSIS — R00.2 PALPITATIONS: Primary | ICD-10-CM

## 2025-08-20 PROCEDURE — 99214 OFFICE O/P EST MOD 30 MIN: CPT | Performed by: NURSE PRACTITIONER

## 2025-08-20 RX ORDER — AMLODIPINE BESYLATE 2.5 MG/1
2.5 TABLET ORAL DAILY
COMMUNITY

## 2025-08-20 RX ORDER — DAPAGLIFLOZIN 10 MG/1
TABLET, FILM COATED ORAL DAILY
COMMUNITY

## 2025-08-20 RX ORDER — IRBESARTAN 300 MG/1
300 TABLET ORAL
COMMUNITY

## 2025-08-20 RX ORDER — AMLODIPINE BESYLATE 5 MG/1
5 TABLET ORAL DAILY
COMMUNITY

## (undated) DEVICE — GLOVE SRG BIOGEL 7.5

## (undated) DEVICE — RAZOR STERILE

## (undated) DEVICE — NEEDLE SPINAL 22G X 3.5IN  QUINCKE

## (undated) DEVICE — SCD SEQUENTIAL COMPRESSION COMFORT SLEEVE MEDIUM KNEE LENGTH: Brand: KENDALL SCD

## (undated) DEVICE — HOLDER PROBE URONAV BK8848

## (undated) DEVICE — FORMALIN PRE-FILLED 10 PCT PROSTATE BIOPSY

## (undated) DEVICE — MAYO STAND COVER: Brand: CONVERTORS

## (undated) DEVICE — MAX-CORE® DISPOSABLE CORE BIOPSY INSTRUMENT, 18G X 20CM: Brand: MAX-CORE

## (undated) DEVICE — PREP PAD BNS: Brand: CONVERTORS

## (undated) DEVICE — GAUZE SPONGES,16 PLY: Brand: CURITY

## (undated) DEVICE — CHLORAPREP HI-LITE 26ML ORANGE

## (undated) DEVICE — SYSTEM TRANSPERINEAL ACCESS PRECISIONPOINT

## (undated) DEVICE — STERILE (2 X 30CM) LATEX COVER: Brand: PROCOVERS™

## (undated) DEVICE — FLEXIBLE ADHESIVE BANDAGE,X-LARGE: Brand: CURITY

## (undated) DEVICE — RENTAL PROBE ULTRASOUND DROP IN BK5000

## (undated) DEVICE — TELFA NON-ADHERENT ABSORBENT DRESSING: Brand: TELFA

## (undated) DEVICE — SPECIMEN CONTAINER STERILE PEEL PACK

## (undated) DEVICE — NEEDLE 25G X 1 1/2

## (undated) DEVICE — ULTRASOUND GEL STERILE FOIL PK

## (undated) DEVICE — 3M™ IOBAN™ 2 ANTIMICROBIAL INCISE DRAPE 6650EZ: Brand: IOBAN™ 2

## (undated) DEVICE — UTILITY MARKER,BLACK WITH LABELS: Brand: DEVON

## (undated) DEVICE — SYRINGE 20ML LL